# Patient Record
Sex: FEMALE | Race: OTHER | NOT HISPANIC OR LATINO
[De-identification: names, ages, dates, MRNs, and addresses within clinical notes are randomized per-mention and may not be internally consistent; named-entity substitution may affect disease eponyms.]

---

## 2017-04-04 ENCOUNTER — APPOINTMENT (OUTPATIENT)
Dept: OPHTHALMOLOGY | Facility: CLINIC | Age: 72
End: 2017-04-04

## 2022-06-16 ENCOUNTER — RESULT REVIEW (OUTPATIENT)
Age: 77
End: 2022-06-16

## 2022-06-16 ENCOUNTER — APPOINTMENT (OUTPATIENT)
Dept: PULMONOLOGY | Facility: CLINIC | Age: 77
End: 2022-06-16
Payer: MEDICARE

## 2022-06-16 ENCOUNTER — APPOINTMENT (OUTPATIENT)
Dept: CT IMAGING | Facility: CLINIC | Age: 77
End: 2022-06-16
Payer: MEDICARE

## 2022-06-16 ENCOUNTER — OUTPATIENT (OUTPATIENT)
Dept: OUTPATIENT SERVICES | Facility: HOSPITAL | Age: 77
LOS: 1 days | End: 2022-06-16

## 2022-06-16 VITALS
SYSTOLIC BLOOD PRESSURE: 109 MMHG | RESPIRATION RATE: 12 BRPM | DIASTOLIC BLOOD PRESSURE: 63 MMHG | HEART RATE: 60 BPM | TEMPERATURE: 97.9 F | OXYGEN SATURATION: 90 %

## 2022-06-16 DIAGNOSIS — Z78.9 OTHER SPECIFIED HEALTH STATUS: ICD-10-CM

## 2022-06-16 DIAGNOSIS — C50.919 MALIGNANT NEOPLASM OF UNSPECIFIED SITE OF UNSPECIFIED FEMALE BREAST: ICD-10-CM

## 2022-06-16 DIAGNOSIS — I10 ESSENTIAL (PRIMARY) HYPERTENSION: ICD-10-CM

## 2022-06-16 DIAGNOSIS — E11.9 TYPE 2 DIABETES MELLITUS W/OUT COMPLICATIONS: ICD-10-CM

## 2022-06-16 DIAGNOSIS — M19.90 UNSPECIFIED OSTEOARTHRITIS, UNSPECIFIED SITE: ICD-10-CM

## 2022-06-16 DIAGNOSIS — U07.1 COVID-19: ICD-10-CM

## 2022-06-16 DIAGNOSIS — E78.5 HYPERLIPIDEMIA, UNSPECIFIED: ICD-10-CM

## 2022-06-16 DIAGNOSIS — I48.20 CHRONIC ATRIAL FIBRILLATION, UNSP: ICD-10-CM

## 2022-06-16 DIAGNOSIS — I50.9 HEART FAILURE, UNSPECIFIED: ICD-10-CM

## 2022-06-16 PROBLEM — Z00.00 ENCOUNTER FOR PREVENTIVE HEALTH EXAMINATION: Noted: 2022-06-16

## 2022-06-16 PROCEDURE — 99204 OFFICE O/P NEW MOD 45 MIN: CPT

## 2022-06-16 PROCEDURE — 71250 CT THORAX DX C-: CPT | Mod: 26,MH

## 2022-06-16 RX ORDER — TRAZODONE HYDROCHLORIDE 100 MG/1
100 TABLET ORAL
Qty: 30 | Refills: 0 | Status: ACTIVE | COMMUNITY
Start: 2022-06-16

## 2022-06-16 RX ORDER — DILTIAZEM HYDROCHLORIDE 120 MG/1
120 CAPSULE, EXTENDED RELEASE ORAL DAILY
Refills: 0 | Status: ACTIVE | COMMUNITY
Start: 2022-06-16

## 2022-06-16 RX ORDER — ATORVASTATIN CALCIUM 40 MG/1
40 TABLET, FILM COATED ORAL DAILY
Qty: 90 | Refills: 3 | Status: ACTIVE | COMMUNITY
Start: 2022-06-16

## 2022-06-16 RX ORDER — DIGOXIN 250 UG/1
250 TABLET ORAL DAILY
Refills: 0 | Status: ACTIVE | COMMUNITY
Start: 2022-06-16

## 2022-06-16 RX ORDER — LETROZOLE TABLETS 2.5 MG/1
2.5 TABLET, FILM COATED ORAL DAILY
Refills: 0 | Status: ACTIVE | COMMUNITY
Start: 2022-06-16

## 2022-06-16 RX ORDER — POTASSIUM CHLORIDE 750 MG/1
10 TABLET, EXTENDED RELEASE ORAL
Qty: 15 | Refills: 11 | Status: ACTIVE | COMMUNITY
Start: 2022-06-16

## 2022-06-16 RX ORDER — APIXABAN 5 MG/1
5 TABLET, FILM COATED ORAL
Refills: 0 | Status: ACTIVE | COMMUNITY
Start: 2022-06-16

## 2022-06-16 RX ORDER — LIDOCAINE 5% 700 MG/1
5 PATCH TOPICAL
Qty: 20 | Refills: 0 | Status: ACTIVE | COMMUNITY
Start: 2022-06-16

## 2022-06-16 RX ORDER — FUROSEMIDE 40 MG/1
40 TABLET ORAL
Qty: 90 | Refills: 3 | Status: ACTIVE | COMMUNITY
Start: 2022-06-16

## 2022-06-16 RX ORDER — ESCITALOPRAM OXALATE 20 MG/1
20 TABLET, FILM COATED ORAL DAILY
Refills: 0 | Status: ACTIVE | COMMUNITY
Start: 2022-06-16

## 2022-06-16 RX ORDER — METFORMIN HYDROCHLORIDE 500 MG/1
500 TABLET, COATED ORAL TWICE DAILY
Qty: 60 | Refills: 11 | Status: ACTIVE | COMMUNITY
Start: 2022-06-16

## 2022-06-16 NOTE — HISTORY OF PRESENT ILLNESS
[Never] : never [TextBox_4] : Was followed by her pulmonologist and cardiologist and will you.  The patient had COVID in 2020 and she had occasional cough and since January she after her second episode of COVID she started complaining of worsening of her productive cough with thick secretions.  She was seen the pulmonologist and was told she has heart failure will give you more Lasix that the cough is worse all day sometimes keeps her up at night and the sputum is very thick.  She was given nebulizer Atrovent by her primary physician and is not helping much [Obstructive Sleep Apnea] : obstructive sleep apnea [Awakes Unrefreshed] : awakes unrefreshed [Awakes with Dry Mouth] : awakes with dry mouth [Awakes with Headache] : awakes with headache [Cataplexy] : denies cataplexy [Daytime Somnolence] : denies daytime somnolence [Difficulty Initiating Sleep] : does not have difficulty initiating sleep [Difficulty Maintaining Sleep] : does not have difficulty maintaining sleep [Dysesthesias] : denies dysesthesias [Fatigue] : fatigue [Snoring] : no snoring [ESS] : 4

## 2022-06-16 NOTE — ASSESSMENT
[FreeTextEntry1] : Chronic cough\par \par I discussed the case in details with the daughter.  I reviewed the CT scan of the chest was done in March and the previous chest x-ray.  The description of the CT scan was consistent with small airway disease.  \par \par In my impression the cough is most likely related to underlying airway disease possible underlying bronchiectasis related to his previous to COVID and pulmonary infections.  My recommendation is to improve the pulmonary toilet\par \par Started the patient on albuterol and Atrovent nebulizer every 6 hours\par \par Started 3% saline nebulizer twice a day\par \par Started Mucomyst 10% twice a day\par \par Follow-up with repeat CT scan to evaluate the tracheobronchial tree and if there is any evidence bronchiectasis and TBM.\par \par Sputum culture sample was obtained and sent for culture\par \par This time there is no evidence of underlying infectious process but there might be an element of swallow disorder and will follow up with the swallow evaluation.\par \par Chronic hypoxic respiratory failure\par \par Patient uses oxygen and at night and his baseline oxygen saturation at rest during the day is normal.\par

## 2022-06-21 LAB — BACTERIA SPT CULT: NORMAL

## 2022-06-23 ENCOUNTER — TRANSCRIPTION ENCOUNTER (OUTPATIENT)
Age: 77
End: 2022-06-23

## 2022-06-23 ENCOUNTER — FORM ENCOUNTER (OUTPATIENT)
Age: 77
End: 2022-06-23

## 2022-06-24 ENCOUNTER — OUTPATIENT (OUTPATIENT)
Dept: OUTPATIENT SERVICES | Facility: HOSPITAL | Age: 77
LOS: 1 days | End: 2022-06-24
Payer: MEDICARE

## 2022-06-24 DIAGNOSIS — G47.33 OBSTRUCTIVE SLEEP APNEA (ADULT) (PEDIATRIC): ICD-10-CM

## 2022-06-24 PROCEDURE — 93306 TTE W/DOPPLER COMPLETE: CPT | Mod: 26

## 2022-06-24 PROCEDURE — 93306 TTE W/DOPPLER COMPLETE: CPT

## 2022-07-01 ENCOUNTER — APPOINTMENT (OUTPATIENT)
Dept: PULMONOLOGY | Facility: CLINIC | Age: 77
End: 2022-07-01

## 2022-07-01 PROCEDURE — 99443: CPT | Mod: 95

## 2022-07-01 NOTE — REASON FOR VISIT
[Home] : at home, [unfilled] , at the time of the visit. [Medical Office: ([facility])___] : at the medical office located in  [Verbal consent obtained from patient] : the patient, [unfilled]

## 2022-07-01 NOTE — ASSESSMENT
[FreeTextEntry1] : Tracheobronchomalacia I discussed the case in details with the patient daughter.  The CT scan of the chest finding was discussed with the patient's daughter in detail.  The the CT scan is consistent with tracheobronchomalacia with mucus impaction in the the right middle lobe and right lower lobe and in the's trachea.  The condition most likely was related to previous infectious process.  Patient is to continue on the nebulizer treatment but also she will require the aggressive pulmonary toilet with aerobic and chest vest.  I discussed with the daughter the need for bronchoscopy to reexpand the right lower lobe and evaluated this is all related to mucus impaction or endobronchial lesion which I doubted as.  Pulmonary nodule  The right lower lobe revealed pulmonary nodules also adrenal nodule which could be benign at the normal.  I recommended PET scan but the patient said that she had a CT scan 10 years ago when the mother healthcare provider reviewed the CT scan and was unchanged.  Informed the daughter to provide me with the report of the CT scan from last 10 years.  Pulmonary hypertension  The echocardiogram revealed decrease in the systolic function and also elevation in pulmonary pressures.  The condition most likely related to group 2 group 3.  No evidence of right heart failure.  Obstructive sleep apnea  I informed the daughter that the patient has to be compliant with the CPAP mask and also might help her in the treatment of tracheobronchomalacia but the concern is promoting mucous plugging.  The daughter will talk to the healthcare agent and they will contact me to discuss the case in more details

## 2022-07-18 ENCOUNTER — INPATIENT (INPATIENT)
Facility: HOSPITAL | Age: 77
LOS: 0 days | Discharge: HOME HEALTH | DRG: 202 | End: 2022-07-19
Payer: COMMERCIAL

## 2022-07-18 VITALS
OXYGEN SATURATION: 92 % | SYSTOLIC BLOOD PRESSURE: 123 MMHG | WEIGHT: 160.06 LBS | HEART RATE: 73 BPM | DIASTOLIC BLOOD PRESSURE: 73 MMHG | RESPIRATION RATE: 18 BRPM | TEMPERATURE: 98 F

## 2022-07-18 DIAGNOSIS — R06.02 SHORTNESS OF BREATH: ICD-10-CM

## 2022-07-18 DIAGNOSIS — J98.09 OTHER DISEASES OF BRONCHUS, NOT ELSEWHERE CLASSIFIED: ICD-10-CM
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
## 2022-07-18 DIAGNOSIS — G47.33 OBSTRUCTIVE SLEEP APNEA (ADULT) (PEDIATRIC): ICD-10-CM

## 2022-07-18 DIAGNOSIS — I48.20 CHRONIC ATRIAL FIBRILLATION, UNSPECIFIED: ICD-10-CM

## 2022-07-18 DIAGNOSIS — T17.490A OTHER FOREIGN OBJECT IN TRACHEA CAUSING ASPHYXIATION, INITIAL ENCOUNTER: ICD-10-CM

## 2022-07-18 DIAGNOSIS — Z99.89 DEPENDENCE ON OTHER ENABLING MACHINES AND DEVICES: ICD-10-CM

## 2022-07-18 DIAGNOSIS — E11.9 TYPE 2 DIABETES MELLITUS WITHOUT COMPLICATIONS: ICD-10-CM

## 2022-07-18 DIAGNOSIS — I11.0 HYPERTENSIVE HEART DISEASE WITH HEART FAILURE: ICD-10-CM

## 2022-07-18 DIAGNOSIS — Z95.0 PRESENCE OF CARDIAC PACEMAKER: ICD-10-CM

## 2022-07-18 DIAGNOSIS — I27.20 PULMONARY HYPERTENSION, UNSPECIFIED: ICD-10-CM

## 2022-07-18 DIAGNOSIS — E78.5 HYPERLIPIDEMIA, UNSPECIFIED: ICD-10-CM

## 2022-07-18 DIAGNOSIS — I51.7 CARDIOMEGALY: ICD-10-CM

## 2022-07-18 DIAGNOSIS — I50.22 CHRONIC SYSTOLIC (CONGESTIVE) HEART FAILURE: ICD-10-CM

## 2022-07-18 DIAGNOSIS — T17.590A OTHER FOREIGN OBJECT IN BRONCHUS CAUSING ASPHYXIATION, INITIAL ENCOUNTER: ICD-10-CM

## 2022-07-18 DIAGNOSIS — J98.11 ATELECTASIS: ICD-10-CM

## 2022-07-18 DIAGNOSIS — Z29.9 ENCOUNTER FOR PROPHYLACTIC MEASURES, UNSPECIFIED: ICD-10-CM

## 2022-07-18 DIAGNOSIS — Z87.09 PERSONAL HISTORY OF OTHER DISEASES OF THE RESPIRATORY SYSTEM: ICD-10-CM

## 2022-07-18 DIAGNOSIS — Z86.39 PERSONAL HISTORY OF OTHER ENDOCRINE, NUTRITIONAL AND METABOLIC DISEASE: ICD-10-CM

## 2022-07-18 DIAGNOSIS — Q32.1 OTHER CONGENITAL MALFORMATIONS OF TRACHEA: ICD-10-CM

## 2022-07-18 DIAGNOSIS — I10 ESSENTIAL (PRIMARY) HYPERTENSION: ICD-10-CM

## 2022-07-18 DIAGNOSIS — U09.9 POST COVID-19 CONDITION, UNSPECIFIED: ICD-10-CM

## 2022-07-18 DIAGNOSIS — J47.9 BRONCHIECTASIS, UNCOMPLICATED: ICD-10-CM

## 2022-07-18 DIAGNOSIS — J96.01 ACUTE RESPIRATORY FAILURE WITH HYPOXIA: ICD-10-CM

## 2022-07-18 DIAGNOSIS — J39.8 OTHER SPECIFIED DISEASES OF UPPER RESPIRATORY TRACT: ICD-10-CM

## 2022-07-18 DIAGNOSIS — J45.909 UNSPECIFIED ASTHMA, UNCOMPLICATED: ICD-10-CM

## 2022-07-18 DIAGNOSIS — Z79.01 LONG TERM (CURRENT) USE OF ANTICOAGULANTS: ICD-10-CM

## 2022-07-18 DIAGNOSIS — I50.9 HEART FAILURE, UNSPECIFIED: ICD-10-CM

## 2022-07-18 DIAGNOSIS — I08.3 COMBINED RHEUMATIC DISORDERS OF MITRAL, AORTIC AND TRICUSPID VALVES: ICD-10-CM

## 2022-07-18 LAB
ALBUMIN SERPL ELPH-MCNC: 4.3 G/DL — SIGNIFICANT CHANGE UP (ref 3.3–5)
ALP SERPL-CCNC: 80 U/L — SIGNIFICANT CHANGE UP (ref 40–120)
ALT FLD-CCNC: 19 U/L — SIGNIFICANT CHANGE UP (ref 10–45)
ANION GAP SERPL CALC-SCNC: 10 MMOL/L — SIGNIFICANT CHANGE UP (ref 5–17)
APTT BLD: 37.1 SEC — HIGH (ref 27.5–35.5)
AST SERPL-CCNC: 21 U/L — SIGNIFICANT CHANGE UP (ref 10–40)
BASE EXCESS BLDV CALC-SCNC: 4 MMOL/L — HIGH (ref -2–3)
BASOPHILS # BLD AUTO: 0.07 K/UL — SIGNIFICANT CHANGE UP (ref 0–0.2)
BASOPHILS NFR BLD AUTO: 0.7 % — SIGNIFICANT CHANGE UP (ref 0–2)
BILIRUB SERPL-MCNC: 0.6 MG/DL — SIGNIFICANT CHANGE UP (ref 0.2–1.2)
BLD GP AB SCN SERPL QL: NEGATIVE — SIGNIFICANT CHANGE UP
BUN SERPL-MCNC: 13 MG/DL — SIGNIFICANT CHANGE UP (ref 7–23)
CA-I SERPL-SCNC: 1.25 MMOL/L — SIGNIFICANT CHANGE UP (ref 1.15–1.33)
CALCIUM SERPL-MCNC: 10 MG/DL — SIGNIFICANT CHANGE UP (ref 8.4–10.5)
CHLORIDE SERPL-SCNC: 103 MMOL/L — SIGNIFICANT CHANGE UP (ref 96–108)
CO2 BLDV-SCNC: 30.6 MMOL/L — HIGH (ref 22–26)
CO2 SERPL-SCNC: 28 MMOL/L — SIGNIFICANT CHANGE UP (ref 22–31)
CREAT SERPL-MCNC: 0.6 MG/DL — SIGNIFICANT CHANGE UP (ref 0.5–1.3)
EGFR: 94 ML/MIN/1.73M2 — SIGNIFICANT CHANGE UP
EOSINOPHIL # BLD AUTO: 0.32 K/UL — SIGNIFICANT CHANGE UP (ref 0–0.5)
EOSINOPHIL NFR BLD AUTO: 3.4 % — SIGNIFICANT CHANGE UP (ref 0–6)
GAS PNL BLDV: 133 MMOL/L — LOW (ref 136–145)
GAS PNL BLDV: SIGNIFICANT CHANGE UP
GLUCOSE SERPL-MCNC: 118 MG/DL — HIGH (ref 70–99)
HCO3 BLDV-SCNC: 29 MMOL/L — SIGNIFICANT CHANGE UP (ref 22–29)
HCT VFR BLD CALC: 42.3 % — SIGNIFICANT CHANGE UP (ref 34.5–45)
HGB BLD-MCNC: 13.8 G/DL — SIGNIFICANT CHANGE UP (ref 11.5–15.5)
IMM GRANULOCYTES NFR BLD AUTO: 0.5 % — SIGNIFICANT CHANGE UP (ref 0–1.5)
INR BLD: 1.6 — HIGH (ref 0.88–1.16)
LYMPHOCYTES # BLD AUTO: 1.63 K/UL — SIGNIFICANT CHANGE UP (ref 1–3.3)
LYMPHOCYTES # BLD AUTO: 17.1 % — SIGNIFICANT CHANGE UP (ref 13–44)
MCHC RBC-ENTMCNC: 31.1 PG — SIGNIFICANT CHANGE UP (ref 27–34)
MCHC RBC-ENTMCNC: 32.6 GM/DL — SIGNIFICANT CHANGE UP (ref 32–36)
MCV RBC AUTO: 95.3 FL — SIGNIFICANT CHANGE UP (ref 80–100)
MONOCYTES # BLD AUTO: 0.74 K/UL — SIGNIFICANT CHANGE UP (ref 0–0.9)
MONOCYTES NFR BLD AUTO: 7.8 % — SIGNIFICANT CHANGE UP (ref 2–14)
NEUTROPHILS # BLD AUTO: 6.71 K/UL — SIGNIFICANT CHANGE UP (ref 1.8–7.4)
NEUTROPHILS NFR BLD AUTO: 70.5 % — SIGNIFICANT CHANGE UP (ref 43–77)
NRBC # BLD: 0 /100 WBCS — SIGNIFICANT CHANGE UP (ref 0–0)
NT-PROBNP SERPL-SCNC: 1932 PG/ML — HIGH (ref 0–300)
PCO2 BLDV: 45 MMHG — HIGH (ref 39–42)
PH BLDV: 7.42 — SIGNIFICANT CHANGE UP (ref 7.32–7.43)
PLATELET # BLD AUTO: 204 K/UL — SIGNIFICANT CHANGE UP (ref 150–400)
PO2 BLDV: 34 MMHG — SIGNIFICANT CHANGE UP (ref 25–45)
POTASSIUM BLDV-SCNC: 3.8 MMOL/L — SIGNIFICANT CHANGE UP (ref 3.5–5.1)
POTASSIUM SERPL-MCNC: 4.1 MMOL/L — SIGNIFICANT CHANGE UP (ref 3.5–5.3)
POTASSIUM SERPL-SCNC: 4.1 MMOL/L — SIGNIFICANT CHANGE UP (ref 3.5–5.3)
PROT SERPL-MCNC: 6.7 G/DL — SIGNIFICANT CHANGE UP (ref 6–8.3)
PROTHROM AB SERPL-ACNC: 19.1 SEC — HIGH (ref 10.5–13.4)
RBC # BLD: 4.44 M/UL — SIGNIFICANT CHANGE UP (ref 3.8–5.2)
RBC # FLD: 14.6 % — HIGH (ref 10.3–14.5)
RH IG SCN BLD-IMP: POSITIVE — SIGNIFICANT CHANGE UP
SAO2 % BLDV: SIGNIFICANT CHANGE UP % (ref 67–88)
SARS-COV-2 RNA SPEC QL NAA+PROBE: NEGATIVE — SIGNIFICANT CHANGE UP
SODIUM SERPL-SCNC: 141 MMOL/L — SIGNIFICANT CHANGE UP (ref 135–145)
TROPONIN T SERPL-MCNC: 0.02 NG/ML — HIGH (ref 0–0.01)
TSH SERPL-MCNC: 1.57 UIU/ML — SIGNIFICANT CHANGE UP (ref 0.27–4.2)
WBC # BLD: 9.52 K/UL — SIGNIFICANT CHANGE UP (ref 3.8–10.5)
WBC # FLD AUTO: 9.52 K/UL — SIGNIFICANT CHANGE UP (ref 3.8–10.5)

## 2022-07-18 PROCEDURE — 99221 1ST HOSP IP/OBS SF/LOW 40: CPT

## 2022-07-18 PROCEDURE — 71045 X-RAY EXAM CHEST 1 VIEW: CPT | Mod: 26

## 2022-07-18 PROCEDURE — 99285 EMERGENCY DEPT VISIT HI MDM: CPT | Mod: CS

## 2022-07-18 RX ORDER — IPRATROPIUM/ALBUTEROL SULFATE 18-103MCG
3 AEROSOL WITH ADAPTER (GRAM) INHALATION EVERY 6 HOURS
Refills: 0 | Status: DISCONTINUED | OUTPATIENT
Start: 2022-07-18 | End: 2022-07-19

## 2022-07-18 RX ORDER — ESCITALOPRAM OXALATE 10 MG/1
20 TABLET, FILM COATED ORAL DAILY
Refills: 0 | Status: DISCONTINUED | OUTPATIENT
Start: 2022-07-18 | End: 2022-07-19

## 2022-07-18 RX ORDER — FUROSEMIDE 40 MG
20 TABLET ORAL DAILY
Refills: 0 | Status: DISCONTINUED | OUTPATIENT
Start: 2022-07-18 | End: 2022-07-19

## 2022-07-18 RX ORDER — TRAZODONE HCL 50 MG
150 TABLET ORAL AT BEDTIME
Refills: 0 | Status: DISCONTINUED | OUTPATIENT
Start: 2022-07-18 | End: 2022-07-19

## 2022-07-18 RX ORDER — FUROSEMIDE 40 MG
20 TABLET ORAL ONCE
Refills: 0 | Status: COMPLETED | OUTPATIENT
Start: 2022-07-19 | End: 2022-07-19

## 2022-07-18 RX ORDER — SODIUM CHLORIDE 9 MG/ML
4 INJECTION INTRAMUSCULAR; INTRAVENOUS; SUBCUTANEOUS EVERY 12 HOURS
Refills: 0 | Status: DISCONTINUED | OUTPATIENT
Start: 2022-07-18 | End: 2022-07-19

## 2022-07-18 RX ORDER — DIGOXIN 250 MCG
250 TABLET ORAL DAILY
Refills: 0 | Status: DISCONTINUED | OUTPATIENT
Start: 2022-07-18 | End: 2022-07-19

## 2022-07-18 RX ORDER — FUROSEMIDE 40 MG
20 TABLET ORAL ONCE
Refills: 0 | Status: DISCONTINUED | OUTPATIENT
Start: 2022-07-18 | End: 2022-07-18

## 2022-07-18 RX ORDER — DILTIAZEM HCL 120 MG
120 CAPSULE, EXT RELEASE 24 HR ORAL DAILY
Refills: 0 | Status: DISCONTINUED | OUTPATIENT
Start: 2022-07-18 | End: 2022-07-18

## 2022-07-18 RX ORDER — METOPROLOL TARTRATE 50 MG
50 TABLET ORAL EVERY 12 HOURS
Refills: 0 | Status: DISCONTINUED | OUTPATIENT
Start: 2022-07-18 | End: 2022-07-19

## 2022-07-18 RX ADMIN — Medication 3 MILLILITER(S): at 21:52

## 2022-07-18 RX ADMIN — SODIUM CHLORIDE 4 MILLILITER(S): 9 INJECTION INTRAMUSCULAR; INTRAVENOUS; SUBCUTANEOUS at 18:02

## 2022-07-18 RX ADMIN — Medication 150 MILLIGRAM(S): at 23:04

## 2022-07-18 NOTE — H&P ADULT - PROBLEM SELECTOR PLAN 2
Likely 2/2 COVID atelectasis and mucus buildup. SOB occurs primarily during coughing episodes. Will likely improve with treatment of above cough. Patient has no CP or LOC.   - See above recs (-persistent cough hx)  - O2 if sat < 92% RA Likely 2/2 COVID atelectasis and mucus buildup. SOB occurs primarily during coughing episodes. Will likely improve with treatment of above cough. Patient has no CP or LOC.   - See above recs (-persistent cough hx)

## 2022-07-18 NOTE — H&P ADULT - PROBLEM SELECTOR PLAN 7
F: Will do bedside swallow to see how tolerating PO  E: replete K<4, Mg<2  N: NPO until bedside swallow   VTE Prophylaxis: Lovenox 40 Q24H  GI: not needed  C: Full Code  D: RMF F: none  E: replete K<4, Mg<2  N: NPO until bedside swallow   VTE Prophylaxis: Lovenox 40 Q24H  GI: not needed  C: Full Code  D: RMF

## 2022-07-18 NOTE — ED PROVIDER NOTE - OBJECTIVE STATEMENT
76F PMH CHF, PPM, DM, CRISTIN, pulm HTN, HTN, afib, HLD, p/w cough. Pt has worsening cough for several months. Has been recently following w/ Dr. Hart, was referred to ED today for admission for likely bronch tomorrow. Pt w/ slowly worsening SOB and cough. Uses CPAP at night. No O2 during the day. Has occasional CP/abd pain - but only while coughing. No other systemic symptoms.   Denies f/c, rhinorrhea, sore throat, HA, NVD, abd pain, NVD, urinary complaints, focal weakness/numbness, LE pain/swelling.     CT chest 6/27/22: 1. Limited study, but findings are consistent with tracheobronchomalacia. 2. Large amount of mucus within the trachea, the bronchus intermedius, in the right middle and lower lobe bronchi, with atelectasis of entire right middle lobe and large portion of right lower lobe. Recommend bronchoscopy. 3. A 21 mm solid nodule is present in the right upper lobe. Lung cancer should be excluded. Recommend biopsy and/or PET scan. 4. Dilated main pulmonary artery, suspicious for pulmonary hypertension. 5. Severe atherosclerotic disease. 6. Cardiomegaly. 7. Moderate pericardial effusion. 8. There is a 3.5 cm nodule in the left adrenal gland. It is not a lipid rich adenoma. This can be further evaluated with either CT scan with adrenal mass protocol or PET scan.  Echo 6/24/22: 1. Mild symmetric left ventricular hypertrophy.  2. Severely reduced left ventricular systolic function.  3. Grade III left ventricular diastolic dysfunction. 4. Normal right ventricular size and systolic function. 5. Severely dilated left atrium. 6. Dilated right atrium. 7. Mild-to-moderate aortic regurgitation.   8. Moderate mitral regurgitation. 9. Aortic sclerosis without significant stenosis. 10. Pulmonary artery systolic pressure is 36 mmHg. 11. Trivial pericardial effusion. 12. No prior echo is available for comparison.

## 2022-07-18 NOTE — H&P ADULT - ASSESSMENT
Patient is a 75 yo G with a PMH of CHF, DM2, CRISTIN (on CPAP), pulm HTN, Permanent Pacemaker, Afib, HLD who presents for a 2 month history of productive cough and moderate SOB. The patient is being admitted for inpatient bronchoscopy 2/2 referral from outpatient physician Dr. Tanner green.

## 2022-07-18 NOTE — CONSULT NOTE ADULT - ATTENDING COMMENTS
Initial attending contact date  7/18/22    . See fellow note written above for details. I reviewed the fellow documentation. I have personally seen and examined this patient. I reviewed vitals, labs, medications, cardiac studies, and additional imaging. I agree with the above fellow's findings and plans as written above with the following additions/statements.    77yo F with a PMH of atelectasis, HFrEF (EF 20-25%), DM2, CRISTIN (on CPAP), pulm HTN, Afib s/p dual chamber PPM placed 12 years ago, HLD, with recent COVID PNA 2 months ago, who presents for a 2 month history of productive cough and moderate SOB. Admitted to [facility] with acute hypoxic respiratory failure with plans for bronchoscopy. Cardiology consulted for pre op.  -Pt with limited functional capacity but without anginal equivalents  -With known HFrEF, currently euvolemic on exam  -EKG V paced  -Non urgent echo to reassess EF  -From cardiac standpoint, pt with compensated HF and is considered int risk for low risk procedure   -Would hold off on cardizem given hx low EF  -Check dig level , cont BB  -Further GDMT pending repeat ECHO

## 2022-07-18 NOTE — CONSULT NOTE ADULT - SUBJECTIVE AND OBJECTIVE BOX
HPI:  Patient is a 77 yo G with a PMH of atelectasis, CHF, DM2, CRISTIN (on CPAP), pulm HTN, permanent Pacemaker, Afib, HLD who presents for a 2 month history of productive cough and moderate SOB. She has been seeing Dr. Hart outpatient who referred her to the ED for admission. The patient's cough has been worsening for this two month period. She denies any blood in the mucus produced from the cough. The patient's SOB worsens with mobility and is mild during rest. Patient had a recent diagnosis of COVID in which she reports that has worsened her cough. During coughing she endorses slight chest and abdominal pain however this is absent outside of the cough. She also has occasional lightheadedness and weakness during coughing episodes which are absent outside of these episodes. Patient endorses some difficulty swallowing large foods. She denies any n/v/d, CP (outside of coughing episodes), sore through, dysuria, weakness (outside of coughing episodes), or LOC .    In terms of social history the patient has the help of her family members (two daughters) who were present at bedside. She uses a walker to ambulate. The patient denies any alcohol, tobacco, or illicit drug use.     In the ED:  Initial vital signs: T: 98 F, HR: 73, BP: XX, R: 18, SpO2: 92% on RA  ED course:   Labs: significant for Glucose 118 PTT 19.1, INR 1.6  Imaging: CT Chest: IMPRESSION: Cardiomegaly, vascular calcification thoracic aorta, and left-sided implanted cardiac device. Scattered increased lung markings with no focal infiltrate or lung consolidation. No significant pleural effusion. No pneumothorax. No acute bone abnormality. Hypoinflation.  ABG: Venous: 4.0, CO2 30.6  EK/7: Pacemaker, good capture, regular rhythm, and appropriate intervals, Rate 60   Echo: Awaiting Results (2022 12:51)      ROS: A 10-point review of systems was otherwise negative.    PAST MEDICAL & SURGICAL HISTORY:  Chronic heart failure      Diabetes mellitus      CRISTIN on CPAP      HTN (hypertension)      Hyperlipidemia      History of permanent cardiac pacemaker placement          SOCIAL HISTORY:  FAMILY HISTORY:      ALLERGIES: 	  No Known Allergies            MEDICATIONS:  digoxin     Tablet 250 MICROGram(s) Oral daily  diltiazem    milliGRAM(s) Oral daily  escitalopram 20 milliGRAM(s) Oral daily  furosemide    Tablet 20 milliGRAM(s) Oral daily  metoprolol tartrate 50 milliGRAM(s) Oral every 12 hours  traZODone 150 milliGRAM(s) Oral at bedtime      HOME MEDICATIONS:  digoxin 250 mcg (0.25 mg) oral tablet: 1 tab(s) orally once a day  dilTIAZem 120 mg/24 hours oral capsule, extended release: 1 cap(s) orally once a day  Eliquis 5 mg oral tablet: 1 tab(s) orally 2 times a day  escitalopram 20 mg oral tablet: 1 tab(s) orally once a day  furosemide 20 mg oral tablet: 1 tab(s) orally once a day  Lipitor 40 mg oral tablet: 1 tab(s) orally once a day  Metoprolol Tartrate 50 mg oral tablet: 1 tab(s) orally 2 times a day  traZODone 150 mg oral tablet, extended release:       PHYSICAL EXAM:  Height (cm): 157.5 ( @ 14:01)  Weight (kg): 72.6 ( @ 10:46)  BMI (kg/m2): 29.3 ( @ 14:01)  BSA (m2): 1.74 ( @ 14:01)    I/O Summary 24H      T(F): 98.1 (22 @ 14:01), Max: 98.1 (22 @ 14:01)  HR: 60 (22 @ 14:01) (60 - 73)  BP: 105/61 (22 @ 14:01) (105/61 - 123/73)  BP(mean): --  ABP: --  ABP(mean): --  RR: 16 (22 @ 14:01) (16 - 18)  SpO2: 93% (22 @ 14:01) (92% - 95%)    GEN: Awake, comfortable. NAD.   HEENT: NCAT, PERRL, EOMI. Mucosa moist. No JVD.   RESP: CTA b/l  CV: RRR, normal s1/s2. No m/r/g.  ABD: Soft, NTND. BS+  EXT: Warm. No edema, clubbing, or cyanosis.   NEURO: AAOx3. No focal deficits.      	  LABS:	 	    CARDIAC MARKERS:              CBC 22 @ 11:27                        13.8   9.52  )-----------( 204                   42.3       Hgb trend: 13.8 <--   WBC trend: 9.52 <--     CMP 22 @ 11:26    141  |  103  |  13  ----------------------------<  118<H>  4.1   |  28  |  0.60    Ca    10.0      22 @ 11:26    TPro  6.7  /  Alb  4.3  /  TBili  0.6  /  DBili  x   /  AST  21  /  ALT  19  /  AlkPhos  80        Serum Cr trend: 0.60 <--   proBNP: Serum Pro-Brain Natriuretic Peptide: 1932 pg/mL ( @ 11:26)    Lipid Profile:   HgA1c:   TSH: Thyroid Stimulating Hormone, Serum: 1.570 uIU/mL ( @ 11:26)      TELEMETRY: 	    ECG:  	  RADIOLOGY:   ECHO:  STRESS:  CATH:   Cardiology HPI:  Patient is a 77 yo G with a PMH of atelectasis, HFrEF (EF 20-25%), DM2, CRISTIN (on CPAP), pulm HTN, Afib s/p dual chamber PPM placed 12 years ago, HLD, with recent COVID PNA 2 months ago, who presents for a 2 month history of productive cough and moderate SOB. She has been seeing Dr. Hart outpatient who referred her to the ED for admission. The patient's cough has been worsening for this two month period. She denies any blood in the mucus produced from the cough. The patient's SOB worsens with mobility and is mild during rest. Patient had a recent diagnosis of COVID in which she reports that has worsened her cough. During coughing she endorses slight chest and abdominal pain however this is absent outside of the cough. She also has occasional lightheadedness and weakness during coughing episodes which are absent outside of these episodes. Patient endorses some difficulty swallowing large foods. She denies any n/v/d, CP (outside of coughing episodes), sore through, dysuria, weakness (outside of coughing episodes), or LOC. Admitted to [facility] for acute hypoxic respiratory failure with plans bronchoscopy tomorrow. Cardiology consulted for pre op.     On evaluation, patient states that she is only able to walk around her house for the past 2 months and even doing so gets her short of breath. Before these last two months, her functional status was already limited to only ADLs around her house secondary to her arthritis. She reports having a stress test 25 years ago but doesn't know results. Denies any chest pain, but has ongoing dyspnea on exertion with cough -- no orthopnea, PND, lower extremity edema.         ROS: A 10-point review of systems was otherwise negative.    PAST MEDICAL & SURGICAL HISTORY:  Chronic heart failure      Diabetes mellitus      CRISTIN on CPAP      HTN (hypertension)      Hyperlipidemia      History of permanent cardiac pacemaker placement          SOCIAL HISTORY:  FAMILY HISTORY:      ALLERGIES: 	  No Known Allergies            MEDICATIONS:  digoxin     Tablet 250 MICROGram(s) Oral daily  diltiazem    milliGRAM(s) Oral daily  escitalopram 20 milliGRAM(s) Oral daily  furosemide    Tablet 20 milliGRAM(s) Oral daily  metoprolol tartrate 50 milliGRAM(s) Oral every 12 hours  traZODone 150 milliGRAM(s) Oral at bedtime      HOME MEDICATIONS:  digoxin 250 mcg (0.25 mg) oral tablet: 1 tab(s) orally once a day  dilTIAZem 120 mg/24 hours oral capsule, extended release: 1 cap(s) orally once a day  Eliquis 5 mg oral tablet: 1 tab(s) orally 2 times a day  escitalopram 20 mg oral tablet: 1 tab(s) orally once a day  furosemide 20 mg oral tablet: 1 tab(s) orally once a day  Lipitor 40 mg oral tablet: 1 tab(s) orally once a day  Metoprolol Tartrate 50 mg oral tablet: 1 tab(s) orally 2 times a day  traZODone 150 mg oral tablet, extended release:       PHYSICAL EXAM:  Height (cm): 157.5 (07-18 @ 14:01)  Weight (kg): 72.6 (07-18 @ 10:46)  BMI (kg/m2): 29.3 (07-18 @ 14:01)  BSA (m2): 1.74 (07-18 @ 14:01)    I/O Summary 24H      T(F): 98.1 (07-18-22 @ 14:01), Max: 98.1 (07-18-22 @ 14:01)  HR: 60 (07-18-22 @ 14:01) (60 - 73)  BP: 105/61 (07-18-22 @ 14:01) (105/61 - 123/73)  BP(mean): --  ABP: --  ABP(mean): --  RR: 16 (07-18-22 @ 14:01) (16 - 18)  SpO2: 93% (07-18-22 @ 14:01) (92% - 95%)    GEN: Awake, comfortable. NAD.   HEENT: NCAT, PERRL, EOMI. Mucosa moist. No JVD.   RESP: wheezing bilaterally, no crackles   CV: RRR, normal s1/s2. No m/r/g.  ABD: Soft, NTND. BS+  EXT: Warm. No edema, clubbing, or cyanosis.   NEURO: AAOx3. No focal deficits.      	  LABS:	 	    CARDIAC MARKERS:              CBC 07-18-22 @ 11:27                        13.8   9.52  )-----------( 204                   42.3       Hgb trend: 13.8 <--   WBC trend: 9.52 <--     CMP 07-18-22 @ 11:26    141  |  103  |  13  ----------------------------<  118<H>  4.1   |  28  |  0.60    Ca    10.0      07-18-22 @ 11:26    TPro  6.7  /  Alb  4.3  /  TBili  0.6  /  DBili  x   /  AST  21  /  ALT  19  /  AlkPhos  80  07-18      Serum Cr trend: 0.60 <--   proBNP: Serum Pro-Brain Natriuretic Peptide: 1932 pg/mL (07-18 @ 11:26)    Lipid Profile:   HgA1c:   TSH: Thyroid Stimulating Hormone, Serum: 1.570 uIU/mL (07-18 @ 11:26)      TELEMETRY: 	    ECG:  	  RADIOLOGY:   ECHO:  STRESS:  CATH:

## 2022-07-18 NOTE — H&P ADULT - PROBLEM SELECTOR PLAN 5
BP on admission 123/73. Patient has no CP, lightheadedness or dizziness outside of coughing episodes.   -Trend Vitals Patient with history of afib.  -continue with home medications (digoxin, metoprolol, eliquis)  -continue with diltiazem 120   -will obtain cardiology clearance prior to bronch.

## 2022-07-18 NOTE — H&P ADULT - NSHPLABSRESULTS_GEN_ALL_CORE
Labs significant for PTT = 19.1, INR 1.60, aPTT= 37.1, Glucose 118                        13.8   9.52  )-----------( 204      ( 18 Jul 2022 11:27 )             42.3     07-18    141  |  103  |  13  ----------------------------<  118<H>  4.1   |  28  |  0.60    Ca    10.0      18 Jul 2022 11:26    TPro  6.7  /  Alb  4.3  /  TBili  0.6  /  DBili  x   /  AST  21  /  ALT  19  /  AlkPhos  80  07-18    PT/INR - ( 18 Jul 2022 11:27 )   PT: 19.1 sec;   INR: 1.60          PTT - ( 18 Jul 2022 11:27 )  PTT:37.1 sec  Fingerstick  glucose:       RADIOLOGY & ADDITIONAL TESTS: Reviewed.

## 2022-07-18 NOTE — CONSULT NOTE ADULT - ASSESSMENT
75yo F with a PMH of atelectasis, HFrEF (EF 20-25%), DM2, CRISTIN (on CPAP), pulm HTN, Afib s/p dual chamber PPM placed 12 years ago, HLD, with recent COVID PNA 2 months ago, who presents for a 2 month history of productive cough and moderate SOB. Admitted to [facility] with acute hypoxic respiratory failure with plans for bronchoscopy. Cardiology consulted for pre op.    #Pre operative evaluation  Admitted with acute hypoxic respiratory failure from suspected reactive airway disease vs COVID - plan for bronchoscopy 7/19  EKG: V paced at HR 60  TTE (6/2022): EF 20-25% with global hypokinesis, dilated LA, moderate MR, PASP 36  METS <4, hard to discern, reportedly also limited by arthritis, only able to do ADLs  Recommendations:  - Patient is intermediate risk for low risk procedure. No further cardiac work up needed prior to bronchoscopy  - Would obtain non urgent TTE to evaluate EF to see if old TTE represents stunned myocardium in setting of recent COVID; if still reduced EF, will require GDMT  - Discontinue home diltiazem given reduced EF and can continue home beta blocker  - Can continue digoxin but please check level in AM      Case d/w attending

## 2022-07-18 NOTE — H&P ADULT - NSHPREVIEWOFSYSTEMS_GEN_ALL_CORE
CONSTITUTIONAL:  weakness only endorsed w cough otherwise no cough, fevers or chills  EYES/ENT:  No visual changes;  No vertigo or throat pain   RESPIRATORY:  + productive nonbloody cough  CARDIOVASCULAR:  No chest pain or palpitations  GASTROINTESTINAL:  No abdominal or epigastric pain. No nausea, vomiting, or hematemesis; No diarrhea or constipation. No melena or hematochezia.  GENITOURINARY:  No dysuria, frequency or hematuria

## 2022-07-18 NOTE — ED ADULT NURSE NOTE - OBJECTIVE STATEMENT
76y female presents to ED c/o productive cough and SOB for a couple of weeks. Referred to ED by MD Hart for evaluation. Pt uses CPAP machine at night. Denies chest pain, abdominal pain, n/v, fevers. A&Ox4.

## 2022-07-18 NOTE — H&P ADULT - HISTORY OF PRESENT ILLNESS
Patient is a 75 yo G with a PMH of atelectasis, CHF, DM2, CRISTIN (on CPAP), pulm HTN, permanent Pacemaker, Afib, HLD who presents for a 2 month history of productive cough and moderate SOB. She has been seeing Dr. Hart outpatient who referred her to the ED for admission. The patient's cough has been worsening for this two month period. She denies any blood in the mucus produced from the cough. The patient's SOB worsens with mobility and is mild during rest. Patient had a recent diagnosis of COVID in which she reports that has worsened her cough. During coughing she endorses slight chest and abdominal pain however this is absent outside of the cough. She also has occasional lightheadedness and weakness during coughing episodes which are absent outside of these episodes. Patient endorses some difficulty swallowing large foods. She denies any n/v/d, CP (outside of coughing episodes), sore through, dysuria, weakness (outside of coughing episodes), or LOC .    In terms of social history the patient has the help of her family members (two daughters) who were present at bedside. She uses a walker to ambulate. The patient denies any alcohol, tobacco, or illicit drug use.     In the ED:  Initial vital signs: T: 98 F, HR: 73, BP: XX, R: 18, SpO2: 92% on RA  ED course:   Labs: significant for Glucose 118 PTT 19.1, INR 1.6  Imaging: CT Chest: IMPRESSION: Cardiomegaly, vascular calcification thoracic aorta, and left-sided implanted cardiac device. Scattered increased lung markings with no focal infiltrate or lung consolidation. No significant pleural effusion. No pneumothorax. No acute bone abnormality. Hypoinflation.  ABG: Venous: 4.0, CO2 30.6  EK/7: Pacemaker, good capture, regular rhythm, and appropriate intervals, Rate 60   Echo: Awaiting Results

## 2022-07-18 NOTE — H&P ADULT - TIME BILLING
Patient seen and examined with house-staff during bedside rounds.  Resident note read, including vitals, physical findings, laboratory data, and radiological reports.   Revisions included below.  Direct personal management at bed side and extensive interpretation of the data.  Plan was outlined and discussed in details with the housestaff.  Decision making of high complexity  Action taken for acute disease activity to reflect the level of care provided:  - medication reconciliation  - review laboratory data  Has history of coronary artery disease A. fib CHF which are chronic systolic with obstructive sleep apnea.  The patient had a recent CT scan for evaluation of the chronic cough and revealed blunting of the right normal.  Patient failed aggressive pulmonary toilet as an outpatient with CoughAssist chest vest bronchodilators and mucolytic.  Patient is scheduled for bronchoscopy tomorrow.  Patient was cleared by cardiology.  Hold anticoagulation.  Discontinue Cardizem.  Continue diuretics.  Patient might benefit from Lasix prior to the procedure tomorrow.  Swallow evaluation for dysphagia post procedure on Wednesday

## 2022-07-18 NOTE — H&P ADULT - NSHPPHYSICALEXAM_GEN_ALL_CORE
VITAL SIGNS:  T(C): 36.7 (07-18-22 @ 10:46), Max: 36.7 (07-18-22 @ 10:46)  T(F): 98 (07-18-22 @ 10:46), Max: 98 (07-18-22 @ 10:46)  HR: 73 (07-18-22 @ 10:46) (73 - 73)  BP: 123/73 (07-18-22 @ 10:46) (123/73 - 123/73)  RR: 16 (07-18-22 @ 12:58) (16 - 18)  SpO2: 95% (07-18-22 @ 12:58) (92% - 95%)      PHYSICAL EXAM:  Constitutional: WDWN resting comfortably in bed; NAD  Eyes: anicteric sclera  ENT: no nasal discharge  Respiratory: CTA B/L; no W/R/R, no retractions  Cardiac: +S1/S2; RRR; no M/R/G; PMI non-displaced  Gastrointestinal: abdomen soft, NT/ND; no rebound or guarding; +BSx4  Extremities: no peripheral edema  Musculoskeletal: NROM x4; no joint swelling, tenderness or erythema  Vascular: 2+ radial, femoral, DP/PT pulses B/L  Dermatologic: skin warm, dry and intact; no rashes, wounds, or scars  Neurologic: AAOx3; CNII-XII grossly intact; no focal deficits

## 2022-07-18 NOTE — H&P ADULT - NSICDXPASTMEDICALHX_GEN_ALL_CORE_FT
PAST MEDICAL HISTORY:  Chronic heart failure     Diabetes mellitus     History of permanent cardiac pacemaker placement     HTN (hypertension)     Hyperlipidemia     CRISTIN on CPAP

## 2022-07-18 NOTE — H&P ADULT - PROBLEM SELECTOR PLAN 1
Likely 2/2 atelectasis and mucus buildup. Patient has had a 2 month history of non-bloody, persistent cough with associated. Patient denies and CP outside of coughing episodes. XR Chest (7/18): Increased lung markings. Sating appropriately on RA. Dr. Tanner gupta from outpatient care  - f/u CT chest for worsening atelectasis to clear for bronchoscopy Likely 2/2 atelectasis and mucus buildup vs reactive airwauydz 2/2 COVID 19. Patient has had a 2 month history of non-bloody, persistent cough. Patient denies and CP outside of coughing episodes. XR Chest (7/18): Increased lung markings. Sating appropriately on RA. CT chest from June demonstrating tracheobronchomalacia, large mucous in bronchus, solid nodule RUL, dilated pulm artery, and cardiomegaly.  - f/u repeat CT chest for worsening atelectasis to clear for bronchoscopy  -plan for bronch tomorrow as per Dr Hart

## 2022-07-18 NOTE — H&P ADULT - PROBLEM SELECTOR PLAN 3
Patient with recently diagnosed HF, on metoprolol, lasix at home.  -continue home meds here  -follow bnp  -last tte: There is mild concentric left ventricular hypertrophy. Left ventricular   systolic function is severely reduced with a calculated ejection fraction   of 25-30% with global hypokinesis. Analysis of mitral annular tissue   Doppler, left atrial volume index, and tricuspid regurgitant velocity   reveals: grade III diastolic dysfunction with elevated filling pressure.  -will obtain cardiology for clearance

## 2022-07-18 NOTE — ED PROVIDER NOTE - CLINICAL SUMMARY MEDICAL DECISION MAKING FREE TEXT BOX
76F PMH CHF, PPM, DM, CRISTIN, pulm HTN, HTN, afib, HLD, p/w cough. Pt has worsening cough for several months. Has been recently following w/ Dr. Hart, was referred to ED today for admission for likely bronch tomorrow. Pt w/ slowly worsening SOB and cough. Uses CPAP at night. No O2 during the day. Has occasional CP/abd pain - but only while coughing. No other systemic symptoms.   Satting low 90s, other vitals wnl. Exam as above.  ddx: Likely pulm etiology - likely related to tracheobronchomalacia/atelectasis/mucus.   d/w Dr. Hart. Will check labs, XR, admit for further care.

## 2022-07-18 NOTE — ED PROVIDER NOTE - PROGRESS NOTE DETAILS
[place]: INR 1.6, other labs grossly wnl. COVID neg. Will admit for further care. Pt remains well appearing, no resp distress.

## 2022-07-18 NOTE — H&P ADULT - NSHPSOCIALHISTORY_GEN_ALL_CORE
Living Situation: Lives alone with help from daughters  Occupation: Retired  Tobacco Use: None  Alcohol Use: None  Drug Use: None  Sexual History: None in last 6mo

## 2022-07-19 ENCOUNTER — TRANSCRIPTION ENCOUNTER (OUTPATIENT)
Age: 77
End: 2022-07-19

## 2022-07-19 VITALS — HEART RATE: 59 BPM | DIASTOLIC BLOOD PRESSURE: 55 MMHG | SYSTOLIC BLOOD PRESSURE: 109 MMHG

## 2022-07-19 LAB
ALBUMIN SERPL ELPH-MCNC: 3.8 G/DL — SIGNIFICANT CHANGE UP (ref 3.3–5)
ALP SERPL-CCNC: 72 U/L — SIGNIFICANT CHANGE UP (ref 40–120)
ALT FLD-CCNC: 15 U/L — SIGNIFICANT CHANGE UP (ref 10–45)
ANION GAP SERPL CALC-SCNC: 10 MMOL/L — SIGNIFICANT CHANGE UP (ref 5–17)
APTT BLD: 31.4 SEC — SIGNIFICANT CHANGE UP (ref 27.5–35.5)
AST SERPL-CCNC: 16 U/L — SIGNIFICANT CHANGE UP (ref 10–40)
BASOPHILS # BLD AUTO: 0.05 K/UL — SIGNIFICANT CHANGE UP (ref 0–0.2)
BASOPHILS NFR BLD AUTO: 0.7 % — SIGNIFICANT CHANGE UP (ref 0–2)
BILIRUB SERPL-MCNC: 0.6 MG/DL — SIGNIFICANT CHANGE UP (ref 0.2–1.2)
BUN SERPL-MCNC: 11 MG/DL — SIGNIFICANT CHANGE UP (ref 7–23)
CALCIUM SERPL-MCNC: 9.5 MG/DL — SIGNIFICANT CHANGE UP (ref 8.4–10.5)
CHLORIDE SERPL-SCNC: 106 MMOL/L — SIGNIFICANT CHANGE UP (ref 96–108)
CO2 SERPL-SCNC: 26 MMOL/L — SIGNIFICANT CHANGE UP (ref 22–31)
CREAT SERPL-MCNC: 0.5 MG/DL — SIGNIFICANT CHANGE UP (ref 0.5–1.3)
DIGOXIN SERPL-MCNC: 1.2 NG/ML — SIGNIFICANT CHANGE UP (ref 0.8–2)
EGFR: 98 ML/MIN/1.73M2 — SIGNIFICANT CHANGE UP
EOSINOPHIL # BLD AUTO: 0.29 K/UL — SIGNIFICANT CHANGE UP (ref 0–0.5)
EOSINOPHIL NFR BLD AUTO: 3.9 % — SIGNIFICANT CHANGE UP (ref 0–6)
GLUCOSE SERPL-MCNC: 91 MG/DL — SIGNIFICANT CHANGE UP (ref 70–99)
GRAM STN FLD: SIGNIFICANT CHANGE UP
HCT VFR BLD CALC: 40.5 % — SIGNIFICANT CHANGE UP (ref 34.5–45)
HCV AB S/CO SERPL IA: 0.04 S/CO — SIGNIFICANT CHANGE UP
HCV AB SERPL-IMP: SIGNIFICANT CHANGE UP
HGB BLD-MCNC: 12.9 G/DL — SIGNIFICANT CHANGE UP (ref 11.5–15.5)
IMM GRANULOCYTES NFR BLD AUTO: 0.4 % — SIGNIFICANT CHANGE UP (ref 0–1.5)
INR BLD: 1.3 — HIGH (ref 0.88–1.16)
LYMPHOCYTES # BLD AUTO: 1.81 K/UL — SIGNIFICANT CHANGE UP (ref 1–3.3)
LYMPHOCYTES # BLD AUTO: 24.3 % — SIGNIFICANT CHANGE UP (ref 13–44)
MCHC RBC-ENTMCNC: 30.4 PG — SIGNIFICANT CHANGE UP (ref 27–34)
MCHC RBC-ENTMCNC: 31.9 GM/DL — LOW (ref 32–36)
MCV RBC AUTO: 95.5 FL — SIGNIFICANT CHANGE UP (ref 80–100)
MONOCYTES # BLD AUTO: 0.71 K/UL — SIGNIFICANT CHANGE UP (ref 0–0.9)
MONOCYTES NFR BLD AUTO: 9.5 % — SIGNIFICANT CHANGE UP (ref 2–14)
NEUTROPHILS # BLD AUTO: 4.56 K/UL — SIGNIFICANT CHANGE UP (ref 1.8–7.4)
NEUTROPHILS NFR BLD AUTO: 61.2 % — SIGNIFICANT CHANGE UP (ref 43–77)
NRBC # BLD: 0 /100 WBCS — SIGNIFICANT CHANGE UP (ref 0–0)
PLATELET # BLD AUTO: 177 K/UL — SIGNIFICANT CHANGE UP (ref 150–400)
POTASSIUM SERPL-MCNC: 4 MMOL/L — SIGNIFICANT CHANGE UP (ref 3.5–5.3)
POTASSIUM SERPL-SCNC: 4 MMOL/L — SIGNIFICANT CHANGE UP (ref 3.5–5.3)
PROT SERPL-MCNC: 5.9 G/DL — LOW (ref 6–8.3)
PROTHROM AB SERPL-ACNC: 15.5 SEC — HIGH (ref 10.5–13.4)
RAPID RVP RESULT: SIGNIFICANT CHANGE UP
RBC # BLD: 4.24 M/UL — SIGNIFICANT CHANGE UP (ref 3.8–5.2)
RBC # FLD: 14.6 % — HIGH (ref 10.3–14.5)
SARS-COV-2 RNA SPEC QL NAA+PROBE: SIGNIFICANT CHANGE UP
SODIUM SERPL-SCNC: 142 MMOL/L — SIGNIFICANT CHANGE UP (ref 135–145)
SPECIMEN SOURCE: SIGNIFICANT CHANGE UP
WBC # BLD: 7.45 K/UL — SIGNIFICANT CHANGE UP (ref 3.8–10.5)
WBC # FLD AUTO: 7.45 K/UL — SIGNIFICANT CHANGE UP (ref 3.8–10.5)

## 2022-07-19 PROCEDURE — 87635 SARS-COV-2 COVID-19 AMP PRB: CPT

## 2022-07-19 PROCEDURE — 87015 SPECIMEN INFECT AGNT CONCNTJ: CPT

## 2022-07-19 PROCEDURE — 99285 EMERGENCY DEPT VISIT HI MDM: CPT

## 2022-07-19 PROCEDURE — 87070 CULTURE OTHR SPECIMN AEROBIC: CPT

## 2022-07-19 PROCEDURE — 82803 BLOOD GASES ANY COMBINATION: CPT

## 2022-07-19 PROCEDURE — 84295 ASSAY OF SERUM SODIUM: CPT

## 2022-07-19 PROCEDURE — 85730 THROMBOPLASTIN TIME PARTIAL: CPT

## 2022-07-19 PROCEDURE — 82330 ASSAY OF CALCIUM: CPT

## 2022-07-19 PROCEDURE — 87206 SMEAR FLUORESCENT/ACID STAI: CPT

## 2022-07-19 PROCEDURE — 93306 TTE W/DOPPLER COMPLETE: CPT | Mod: 26

## 2022-07-19 PROCEDURE — 85025 COMPLETE CBC W/AUTO DIFF WBC: CPT

## 2022-07-19 PROCEDURE — 86901 BLOOD TYPING SEROLOGIC RH(D): CPT

## 2022-07-19 PROCEDURE — 86900 BLOOD TYPING SEROLOGIC ABO: CPT

## 2022-07-19 PROCEDURE — 84443 ASSAY THYROID STIM HORMONE: CPT

## 2022-07-19 PROCEDURE — 94640 AIRWAY INHALATION TREATMENT: CPT

## 2022-07-19 PROCEDURE — 71045 X-RAY EXAM CHEST 1 VIEW: CPT

## 2022-07-19 PROCEDURE — 84484 ASSAY OF TROPONIN QUANT: CPT

## 2022-07-19 PROCEDURE — 85610 PROTHROMBIN TIME: CPT

## 2022-07-19 PROCEDURE — 36415 COLL VENOUS BLD VENIPUNCTURE: CPT

## 2022-07-19 PROCEDURE — 71250 CT THORAX DX C-: CPT | Mod: 26

## 2022-07-19 PROCEDURE — 87116 MYCOBACTERIA CULTURE: CPT

## 2022-07-19 PROCEDURE — 87102 FUNGUS ISOLATION CULTURE: CPT

## 2022-07-19 PROCEDURE — 92610 EVALUATE SWALLOWING FUNCTION: CPT

## 2022-07-19 PROCEDURE — 80162 ASSAY OF DIGOXIN TOTAL: CPT

## 2022-07-19 PROCEDURE — 0225U NFCT DS DNA&RNA 21 SARSCOV2: CPT

## 2022-07-19 PROCEDURE — 86803 HEPATITIS C AB TEST: CPT

## 2022-07-19 PROCEDURE — 93306 TTE W/DOPPLER COMPLETE: CPT

## 2022-07-19 PROCEDURE — 80053 COMPREHEN METABOLIC PANEL: CPT

## 2022-07-19 PROCEDURE — 83880 ASSAY OF NATRIURETIC PEPTIDE: CPT

## 2022-07-19 PROCEDURE — 86850 RBC ANTIBODY SCREEN: CPT

## 2022-07-19 PROCEDURE — 31622 DX BRONCHOSCOPE/WASH: CPT | Mod: GC

## 2022-07-19 PROCEDURE — 94660 CPAP INITIATION&MGMT: CPT

## 2022-07-19 PROCEDURE — 84132 ASSAY OF SERUM POTASSIUM: CPT

## 2022-07-19 PROCEDURE — 71250 CT THORAX DX C-: CPT

## 2022-07-19 PROCEDURE — 31645 BRNCHSC W/THER ASPIR 1ST: CPT

## 2022-07-19 PROCEDURE — 99239 HOSP IP/OBS DSCHRG MGMT >30: CPT | Mod: 25

## 2022-07-19 PROCEDURE — 93005 ELECTROCARDIOGRAM TRACING: CPT

## 2022-07-19 RX ORDER — TIOTROPIUM BROMIDE 18 UG/1
2 CAPSULE ORAL; RESPIRATORY (INHALATION)
Qty: 0 | Refills: 0 | DISCHARGE

## 2022-07-19 RX ORDER — DIGOXIN 250 MCG
250 TABLET ORAL DAILY
Refills: 0 | Status: DISCONTINUED | OUTPATIENT
Start: 2022-07-19 | End: 2022-07-19

## 2022-07-19 RX ORDER — METOPROLOL TARTRATE 50 MG
1 TABLET ORAL
Qty: 0 | Refills: 0 | DISCHARGE

## 2022-07-19 RX ORDER — DILTIAZEM HCL 120 MG
1 CAPSULE, EXT RELEASE 24 HR ORAL
Qty: 0 | Refills: 0 | DISCHARGE

## 2022-07-19 RX ORDER — IPRATROPIUM/ALBUTEROL SULFATE 18-103MCG
3 AEROSOL WITH ADAPTER (GRAM) INHALATION
Qty: 0 | Refills: 0 | DISCHARGE
Start: 2022-07-19

## 2022-07-19 RX ORDER — FUROSEMIDE 40 MG
1 TABLET ORAL
Qty: 0 | Refills: 0 | DISCHARGE

## 2022-07-19 RX ORDER — CEFPODOXIME PROXETIL 100 MG
200 TABLET ORAL EVERY 12 HOURS
Refills: 0 | Status: DISCONTINUED | OUTPATIENT
Start: 2022-07-19 | End: 2022-07-19

## 2022-07-19 RX ORDER — TIOTROPIUM BROMIDE 18 UG/1
2 CAPSULE ORAL; RESPIRATORY (INHALATION)
Qty: 1 | Refills: 1
Start: 2022-07-19 | End: 2022-09-16

## 2022-07-19 RX ORDER — SODIUM CHLORIDE 9 MG/ML
4 INJECTION INTRAMUSCULAR; INTRAVENOUS; SUBCUTANEOUS
Qty: 240 | Refills: 0
Start: 2022-07-19 | End: 2022-08-17

## 2022-07-19 RX ORDER — SODIUM CHLORIDE 9 MG/ML
4 INJECTION INTRAMUSCULAR; INTRAVENOUS; SUBCUTANEOUS
Qty: 0 | Refills: 0 | DISCHARGE
Start: 2022-07-19

## 2022-07-19 RX ORDER — TRAZODONE HCL 50 MG
0 TABLET ORAL
Qty: 0 | Refills: 0 | DISCHARGE

## 2022-07-19 RX ADMIN — Medication 50 MILLIGRAM(S): at 07:57

## 2022-07-19 RX ADMIN — Medication 200 MILLIGRAM(S): at 18:46

## 2022-07-19 RX ADMIN — Medication 5 MILLIGRAM(S): at 14:21

## 2022-07-19 RX ADMIN — Medication 20 MILLIGRAM(S): at 07:56

## 2022-07-19 RX ADMIN — Medication 20 MILLIGRAM(S): at 07:32

## 2022-07-19 RX ADMIN — ESCITALOPRAM OXALATE 20 MILLIGRAM(S): 10 TABLET, FILM COATED ORAL at 14:21

## 2022-07-19 NOTE — DISCHARGE NOTE NURSING/CASE MANAGEMENT/SOCIAL WORK - NSDCPEFALRISK_GEN_ALL_CORE
For information on Fall & Injury Prevention, visit: [email or web address] OR  [email or web address] OR  https://www.cdc.gov/steadi/patient.html

## 2022-07-19 NOTE — PROGRESS NOTE ADULT - PROBLEM SELECTOR PLAN 1
Likely 2/2 atelectasis and mucus buildup vs reactive airway disease 2/2 COVID 19. Patient has had a 2 month history of non-bloody, persistent cough. Patient denies and CP outside of coughing episodes. XR Chest (7/18): Increased lung markings. Sating appropriately on RA. CT chest from June demonstrating tracheobronchomalacia, large mucous in bronchus, solid nodule RUL, dilated pulm artery, and cardiomegaly.    - Sodium Chloride 3% inhalation via nebulizer   - Albuterol ordered prn  - Bronch performed today by Dr. Hart  - f/u repeat CT chest for worsening atelectasis

## 2022-07-19 NOTE — SWALLOW BEDSIDE ASSESSMENT ADULT - SLP PERTINENT HISTORY OF CURRENT PROBLEM
75 yo W w h/o atelectasis, CHF, DM2, CRISTIN, pulm HTN adm w 2-mo h/o productive cough & SOB i/s/o recent dx of Covid

## 2022-07-19 NOTE — PROGRESS NOTE ADULT - PROBLEM SELECTOR PLAN 7
F: none  E: replete K<4, Mg<2  N: NPO until bedside swallow   VTE Prophylaxis: Lovenox 40 Q24H  GI: not needed  C: Full Code  D: RMF

## 2022-07-19 NOTE — DISCHARGE NOTE PROVIDER - CARE PROVIDERS DIRECT ADDRESSES
,DirectAddress_Unknown,[email or web address]

## 2022-07-19 NOTE — DISCHARGE NOTE PROVIDER - CARE PROVIDER_API CALL
Matias Dickinson  [address] [place] [address]  Phone: [phone]  Fax: (   )    -  Established Patient  Follow Up Time: 2 weeks    Marissa Hart)  Critical Care Medicine; Pulmonary Disease  [address] [place] [address]  Phone: [phone]  Fax: [phone]  Established Patient  Scheduled Appointment: 08/16/2022 04:15 PM

## 2022-07-19 NOTE — DISCHARGE NOTE PROVIDER - NSDCCPCAREPLAN_GEN_ALL_CORE_FT
PRINCIPAL DISCHARGE DIAGNOSIS  Diagnosis: Shortness of breath  Assessment and Plan of Treatment:       SECONDARY DISCHARGE DIAGNOSES  Diagnosis: Tracheobronchomalacia  Assessment and Plan of Treatment:     Diagnosis: Chronic HFrEF (heart failure with reduced ejection fraction)  Assessment and Plan of Treatment: -Heart failure is a condition that occurs when the heart cannot pump blood as well as it should; this leads to inadequate blood flow to vital organs such as the kidneys and congestion (buildup of fluid) in other vital organs such as the lungs   -You have a history of weakened heart muscle called reduced congestive heart failure. When the heart pumps, it doesn't squeeze normally.   -Please make sure you follow up with your cardiologist within one week of discharge.    -Please weigh yourself daily: if you have gained more than 2-3 lbs in one day or 5 lbs in one week contact your doctor immediately as you may be retaining water weight   -In addition, restrict your salt intake to less than 2 grams a day   -If you develop worsening shortening of breath, leg swelling, fatigue, chest pain, difficulty sleeping at night due to shortness of breath, contact your cardiologist immediately.    Diagnosis: Chronic atrial fibrillation  Assessment and Plan of Treatment: You have a known diagnosis of atrial fibrillation prior to your admission, which is an irregular heart rhythm. This is a condition where the atria of your heart do not contract properly, which increases your risk of stroke or heart attack.    -Please continue to take your home medications of ___________________    -Please follow up with your cardiologist within 1-2 weeks of discharge for evaluation and to determine whether any changes need to be made to your medications.       PRINCIPAL DISCHARGE DIAGNOSIS  Diagnosis: SOB (shortness of breath)  Assessment and Plan of Treatment: You were admitted to the hospital because of your shortness of breath. During this admission, you were evaluated by the pulmonologist Dr. Hart. He performed a procedure called a bronchoscopy to take a look at the inside of your lungs. You have a condition called tracheobronchomalacia (TBM), which is a weakened airway. We monitored your oxygen saturation and your vital signs to make sure you were stable before leaving the hospital.   When you leave the hospital, it is important to continue your pulmonary toilet regimen, which includes saline and acetylcysteine nebulizer treatments, as well as your and Ipratroprium-albuterol nebulizer treatment. You should also continue to take your inhaler Spiriva (2 puffs by mouth daily). Finally, wearing your CPAP machine regularly when you sleep can help keep your airways open and improve your symptoms.   You have a follow-up appointment with Dr. Hart on August 16th at 4:15 PM.  If you continue to experience shortness of breath and your symptoms do not improve, please go to the nearest emergency room or call 911.      SECONDARY DISCHARGE DIAGNOSES  Diagnosis: Chronic HFrEF (heart failure with reduced ejection fraction)  Assessment and Plan of Treatment: Heart failure is a condition that occurs when the heart cannot pump blood as well as it should; this leads to inadequate blood flow to vital organs such as the kidneys and congestion (buildup of fluid) in other vital organs such as the lungs. You have a history of weakened heart muscle called reduced congestive heart failure. When the heart pumps, it doesn't squeeze normally.   While you were in the hospital, we did imaging of your heart called an echocardiography to take a look at the way your heart is pumping. We compared it to the echnocardiogram that was performed on 6/24/22. During this admission, we discontinued one of your medications (Diltiazem) before your bronchoscopy because it can interfere with the heart's ability to pump blood. Please do not continue to take this medication when you leave the hospital. It is important to follow up with your cardiologist Dr. Dickinson after you leave the hospital. Dr. Dickinson will let you know if it is safe to restart this medication.   The office of Dr. Dickinson will reach out to you in order to schedule an appointment.   Please continue to take metoprolol 50 mg twice a day.  Please continue to take furosemide 20 mg once a day.   To monitor and control your heart failure:  -Please weigh yourself daily: if you have gained more than 2-3 lbs in one day or 5 lbs in one week contact your doctor immediately as you may be retaining water weight   -In addition, restrict your salt intake to less than 2 grams a day   -If you develop worsening shortening of breath, leg swelling, fatigue, chest pain, difficulty sleeping at night due to shortness of breath, contact your cardiologist immediately.    Diagnosis: Chronic atrial fibrillation  Assessment and Plan of Treatment: You have a known diagnosis of atrial fibrillation prior to your admission, which is an irregular heart rhythm. This is a condition where the atria of your heart do not contract properly, which increases your risk of stroke or heart attack.    -Please continue to take your home medications metoprolol 50 mg twice a day.    -Please continue to take digoxin 250 mg every day.     -Please follow up with your cardiologist Dr. Dickinson for evaluation and to determine whether any changes need to be made to your medications.      Diagnosis: Tracheobronchomalacia  Assessment and Plan of Treatment: During this hospitalization, you were found to have tracheobronchomalacia (TBM). TBM occurs when the walls of the airway, specifically the trachea and bronchi, are weak. As you breathe out, the weak airway collapses partially or completely. TBM can be seen in adults with weakened airways. Symptoms include shortnes of breath, a "barking" cough, chronic airway infections, and wheezing. TBM is difficult to diagnose because it can mimic other diseases. While you were in the hospital, your pulmonologist Dr. Hart confirmed this diagnosis with a procedure called a bronchoscopy.  When you leave the hospital, it is important to continue your pulmonary toilet regimen, which includes saline and acetylcysteine nebulizer treatments, as well as your and Ipratroprium-albuterol nebulizer treatment. You should also continue to take your inhaler Spiriva (2 puffs by mouth daily). Finally, wearing your CPAP machine regularly when you sleep can help keep your airways open and improve your symptoms.   You have a follow-up appointment with Dr. Hart on August 16th at 4:15 PM.  If you continue to experience shortness of breath and your symptoms do not improve, please go to the nearest emergency room or call 911.    Diagnosis: Chronic hypertension  Assessment and Plan of Treatment: You have a known history of high blood pressure prior to your admission. To manage this you are on a medication called ____. High blood pressure can cause damage to your heart and kidneys and increases your risk of heart attack and stroke. To avoid this, It is important that you continue to take this medication when you are discharged so that you can continue to control your blood pressure. Additionally be sure to follow up with your primary care physician on a regular basis to make sure your blood pressure continues to be well controlled. If you experience symptoms such as but not limited to: sudden onset blurry vision, nausea, vomiting, chest pain, shortness of breath, or palpitations, please go to the nearest emergency room.      Diagnosis: Diabetes mellitus  Assessment and Plan of Treatment: You have a known history of diabetes mellitus prior to your admission. This condition results from blood sugar levels getting too high because your body is more resistant to insulin. Uncontrolled blood sugar levels can lead to kidney and heart damage, pain/numbness/paralysis in your hands and feet, and increased rates of infections. It is important that you continue your regular home medications when you are discharged so that you can continue to control your blood sugar levels. Additionally be sure to follow up with your primary care physician, podiatrist, and ophthalmologist on a regular basis.      Diagnosis: SOB (shortness of breath)  Assessment and Plan of Treatment:      PRINCIPAL DISCHARGE DIAGNOSIS  Diagnosis: SOB (shortness of breath)  Assessment and Plan of Treatment: You were admitted to the hospital because of your shortness of breath. During this admission, you were evaluated by the pulmonologist Dr. Hart. He performed a procedure called a bronchoscopy to take a look at the inside of your lungs. You have a condition called tracheobronchomalacia (TBM), which is a weakened airway. We monitored your oxygen saturation and your vital signs to make sure you were stable before leaving the hospital.   When you leave the hospital, it is important to continue your pulmonary toilet regimen, which includes saline and acetylcysteine nebulizer treatments, as well as your and Ipratroprium-albuterol nebulizer treatment. You should also continue to take your inhaler Spiriva (2 puffs by mouth daily). Finally, wearing your CPAP machine regularly when you sleep can help keep your airways open and improve your symptoms.   You have a follow-up appointment with Dr. Hart on August 16th at 4:15 PM.  If you continue to experience shortness of breath and your symptoms do not improve, please go to the nearest emergency room or call 911.      SECONDARY DISCHARGE DIAGNOSES  Diagnosis: Chronic HFrEF (heart failure with reduced ejection fraction)  Assessment and Plan of Treatment: Heart failure is a condition that occurs when the heart cannot pump blood as well as it should; this leads to inadequate blood flow to vital organs such as the kidneys and congestion (buildup of fluid) in other vital organs such as the lungs. You have a history of weakened heart muscle called reduced congestive heart failure. When the heart pumps, it doesn't squeeze normally.   While you were in the hospital, we did imaging of your heart called an echocardiography to take a look at the way your heart is pumping. We compared it to the echnocardiogram that was performed on 6/24/22. During this admission, we discontinued one of your medications (Diltiazem) before your bronchoscopy because it can interfere with the heart's ability to pump blood. Please do not continue to take this medication when you leave the hospital. It is important to follow up with your cardiologist Dr. Dickinson after you leave the hospital. Dr. Dickinson will let you know if it is safe to restart this medication.   The office of Dr. Dickinson will reach out to you in order to schedule an appointment.   Please continue to take metoprolol 50 mg twice a day.  Please continue to take furosemide 20 mg once a day.   To monitor and control your heart failure:  -Please weigh yourself daily: if you have gained more than 2-3 lbs in one day or 5 lbs in one week contact your doctor immediately as you may be retaining water weight   -In addition, restrict your salt intake to less than 2 grams a day   -If you develop worsening shortening of breath, leg swelling, fatigue, chest pain, difficulty sleeping at night due to shortness of breath, contact your cardiologist immediately.    Diagnosis: Chronic atrial fibrillation  Assessment and Plan of Treatment: You have a known diagnosis of atrial fibrillation prior to your admission, which is an irregular heart rhythm. This is a condition where the atria of your heart do not contract properly, which increases your risk of stroke or heart attack.    -Please continue to take your home medications metoprolol 50 mg twice a day.    -Please continue to take digoxin 250 mg every day.     -Please follow up with your cardiologist Dr. Dickinson for evaluation and to determine whether any changes need to be made to your medications.      Diagnosis: Tracheobronchomalacia  Assessment and Plan of Treatment: During this hospitalization, you were found to have tracheobronchomalacia (TBM). TBM occurs when the walls of the airway, specifically the trachea and bronchi, are weak. As you breathe out, the weak airway collapses partially or completely. TBM can be seen in adults with weakened airways. Symptoms include shortnes of breath, a "barking" cough, chronic airway infections, and wheezing. TBM is difficult to diagnose because it can mimic other diseases. While you were in the hospital, your pulmonologist Dr. Hart confirmed this diagnosis with a procedure called a bronchoscopy.  When you leave the hospital, it is important to continue your pulmonary toilet regimen, which includes saline and acetylcysteine nebulizer treatments, as well as your and Ipratroprium-albuterol nebulizer treatment. You should also continue to take your inhaler Spiriva (2 puffs by mouth daily). Finally, wearing your CPAP machine regularly when you sleep can help keep your airways open and improve your symptoms.   You have a follow-up appointment with Dr. Hart on August 16th at 4:15 PM.  If you continue to experience shortness of breath and your symptoms do not improve, please go to the nearest emergency room or call 911.    Diagnosis: Diabetes mellitus  Assessment and Plan of Treatment: You have a known history of diabetes mellitus prior to your admission. This condition results from blood sugar levels getting too high because your body is more resistant to insulin. Uncontrolled blood sugar levels can lead to kidney and heart damage, pain/numbness/paralysis in your hands and feet, and increased rates of infections. It is important that you continue your regular home medications when you are discharged so that you can continue to control your blood sugar levels. Additionally be sure to follow up with your primary care physician, podiatrist, and ophthalmologist on a regular basis.      Diagnosis: SOB (shortness of breath)  Assessment and Plan of Treatment:     Diagnosis: Chronic hypertension  Assessment and Plan of Treatment: You have a known history of high blood pressure prior to your admission. To manage this you are on a medication called Metoprolol and furosemide. High blood pressure can cause damage to your heart and kidneys and increases your risk of heart attack and stroke. To avoid this, It is important that you continue to take this medication when you are discharged so that you can continue to control your blood pressure. Additionally be sure to follow up with your primary care physician on a regular basis to make sure your blood pressure continues to be well controlled. If you experience symptoms such as but not limited to: sudden onset blurry vision, nausea, vomiting, chest pain, shortness of breath, or palpitations, please go to the nearest emergency room.       PRINCIPAL DISCHARGE DIAGNOSIS  Diagnosis: SOB (shortness of breath)  Assessment and Plan of Treatment: You were admitted to the hospital because of your shortness of breath. During this admission, you were evaluated by the pulmonologist Dr. Hart. He performed a procedure called a bronchoscopy to take a look at the inside of your lungs. You have a condition called tracheobronchomalacia (TBM), which is a weakened airway. We monitored your oxygen saturation and your vital signs to make sure you were stable before leaving the hospital.   When you leave the hospital, it is important to continue your pulmonary toilet regimen, which includes saline and acetylcysteine nebulizer treatments, as well as your and Ipratroprium-albuterol nebulizer treatment. You should also continue to take your inhaler Spiriva (2 puffs by mouth daily). Finally, wearing your CPAP machine regularly when you sleep can help keep your airways open and improve your symptoms.   You have a follow-up appointment with Dr. Hart on August 16th at 4:15 PM.  If you continue to experience shortness of breath and your symptoms do not improve, please go to the nearest emergency room or call 911.      SECONDARY DISCHARGE DIAGNOSES  Diagnosis: Tracheobronchomalacia  Assessment and Plan of Treatment: During this hospitalization, you were found to have tracheobronchomalacia (TBM). TBM occurs when the walls of the airway, specifically the trachea and bronchi, are weak. As you breathe out, the weak airway collapses partially or completely. TBM can be seen in adults with weakened airways. Symptoms include shortnes of breath, a "barking" cough, chronic airway infections, and wheezing. TBM is difficult to diagnose because it can mimic other diseases. While you were in the hospital, your pulmonologist Dr. Hart confirmed this diagnosis with a procedure called a bronchoscopy.  When you leave the hospital, it is important to continue your pulmonary toilet regimen, which includes saline and acetylcysteine nebulizer treatments, as well as your and Ipratroprium-albuterol nebulizer treatment. You should also continue to take your inhaler Spiriva (2 puffs by mouth daily). Finally, wearing your CPAP machine regularly when you sleep can help keep your airways open and improve your symptoms.   You have a follow-up appointment with Dr. Hart on August 16th at 4:15 PM.  If you continue to experience shortness of breath and your symptoms do not improve, please go to the nearest emergency room or call 911.    Diagnosis: Positive culture findings in sputum  Assessment and Plan of Treatment: On bronchoscopy your sputum was tested and found to be positive for bacteria. The final cultures are still pending but at this time, please continue taking Cefpodoxime 200mg every 12 hours for 5 days through 7/24/2022. We will contact you with final culture results and determine if any additional antibiotic mangement is necessary.    Diagnosis: Chronic HFrEF (heart failure with reduced ejection fraction)  Assessment and Plan of Treatment: Heart failure is a condition that occurs when the heart cannot pump blood as well as it should; this leads to inadequate blood flow to vital organs such as the kidneys and congestion (buildup of fluid) in other vital organs such as the lungs. You have a history of weakened heart muscle called reduced congestive heart failure. When the heart pumps, it doesn't squeeze normally.   While you were in the hospital, we did imaging of your heart called an echocardiography to take a look at the way your heart is pumping. We compared it to the echnocardiogram that was performed on 6/24/22. During this admission, we discontinued one of your medications (Diltiazem) before your bronchoscopy because it can interfere with the heart's ability to pump blood. Please do not continue to take this medication when you leave the hospital. It is important to follow up with your cardiologist Dr. Dickinson after you leave the hospital. Dr. Dickinson will let you know if it is safe to restart this medication.   The office of Dr. Dickinson will reach out to you in order to schedule an appointment.   While in the hospital your metoprolol was transitioned to a long acting form of the same medication to be taken once daily. Please continue to take metoprolol succinate 100 mg once a day starting 7/20/2022 in the morning.  Please continue to take furosemide 20 mg once a day.   To monitor and control your heart failure:  -Please weigh yourself daily: if you have gained more than 2-3 lbs in one day or 5 lbs in one week contact your doctor immediately as you may be retaining water weight   -In addition, restrict your salt intake to less than 2 grams a day   -If you develop worsening shortening of breath, leg swelling, fatigue, chest pain, difficulty sleeping at night due to shortness of breath, contact your cardiologist immediately.    Diagnosis: Chronic atrial fibrillation  Assessment and Plan of Treatment: You have a known diagnosis of atrial fibrillation prior to your admission, which is an irregular heart rhythm. This is a condition where the atria of your heart do not contract properly, which increases your risk of stroke or heart attack.    -Please continue to take your home medications metoprolol succinate 100 mg once a day starting 7/20/2022.    -Please continue to take digoxin 250 mg every day.     -Please follow up with your cardiologist Dr. Dickinson for evaluation and to determine whether any changes need to be made to your medications. While in the hospital, your blood pressures were relatively low so pending follow up outpatient, Dr. Dickinson can determine when it is appropriate to start Losartan and Spironolactone.       Diagnosis: Diabetes mellitus  Assessment and Plan of Treatment: You have a known history of diabetes mellitus prior to your admission. This condition results from blood sugar levels getting too high because your body is more resistant to insulin. Uncontrolled blood sugar levels can lead to kidney and heart damage, pain/numbness/paralysis in your hands and feet, and increased rates of infections. It is important that you continue your regular home medications when you are discharged so that you can continue to control your blood sugar levels. Additionally be sure to follow up with your primary care physician, podiatrist, and ophthalmologist on a regular basis.      Diagnosis: Chronic hypertension  Assessment and Plan of Treatment: You have a known history of high blood pressure prior to your admission. To manage this you are on a medication called Metoprolol succinate and furosemide. High blood pressure can cause damage to your heart and kidneys and increases your risk of heart attack and stroke. To avoid this, It is important that you continue to take this medication when you are discharged so that you can continue to control your blood pressure. Additionally be sure to follow up with your primary care physician on a regular basis to make sure your blood pressure continues to be well controlled. If you experience symptoms such as but not limited to: sudden onset blurry vision, nausea, vomiting, chest pain, shortness of breath, or palpitations, please go to the nearest emergency room.

## 2022-07-19 NOTE — PROGRESS NOTE ADULT - SUBJECTIVE AND OBJECTIVE BOX
OVERNIGHT EVENTS: No acute events    SUBJECTIVE / INTERVAL HPI: Patient seen and examined at bedside. Patient agitated due to poor sleep and frustration about the multiple tests she has to endure. Tried explaining the importance/purpose of each exam in place and the expectations as to the rest of her hospital stay. Has been coughing regularly with sputum production, some sob on 3L NC but denies chest pain, palpitations, abdominal pain, N/V, lower extremity pain.      PHYSICAL EXAM:    General: WDWN  HEENT: NC/AT; PERRL, anicteric sclera; MMM  Neck: supple  Cardiovascular: +S1/S2, RRR  Respiratory: CTA B/L; no W/R/R  Gastrointestinal: soft, NT/ND; +BSx4  Extremities: WWP; no edema, clubbing or cyanosis  Vascular: 2+ radial, DP/PT pulses B/L  Neurological: AAOx3; no focal deficits  Psychiatric: pleasant mood and affect  Dermatologic: no appreciable wounds or damage to the skin    VITAL SIGNS:  Vital Signs Last 24 Hrs  T(C): 36.6 (19 Jul 2022 13:25), Max: 36.7 (18 Jul 2022 14:01)  T(F): 97.9 (19 Jul 2022 13:25), Max: 98.1 (18 Jul 2022 14:01)  HR: 61 (19 Jul 2022 13:25) (59 - 72)  BP: 115/76 (19 Jul 2022 13:25) (105/61 - 138/83)  BP(mean): --  RR: 20 (19 Jul 2022 13:25) (16 - 20)  SpO2: 93% (19 Jul 2022 13:25) (91% - 97%)    Parameters below as of 19 Jul 2022 13:25  Patient On (Oxygen Delivery Method): nasal cannula  O2 Flow (L/min): 3        MEDICATIONS:  MEDICATIONS  (STANDING):  bisacodyl 5 milliGRAM(s) Oral once  digoxin     Tablet 250 MICROGram(s) Oral daily  escitalopram 20 milliGRAM(s) Oral daily  furosemide    Tablet 20 milliGRAM(s) Oral daily  metoprolol tartrate 50 milliGRAM(s) Oral every 12 hours  sodium chloride 3%  Inhalation 4 milliLiter(s) Inhalation every 12 hours  traZODone 150 milliGRAM(s) Oral at bedtime    MEDICATIONS  (PRN):  albuterol/ipratropium for Nebulization 3 milliLiter(s) Nebulizer every 6 hours PRN Shortness of Breath    ALLERGIES:  Allergies    No Known Allergies    Intolerances    LABS:                        12.9   7.45  )-----------( 177      ( 19 Jul 2022 05:30 )             40.5     07-19    142  |  106  |  11  ----------------------------<  91  4.0   |  26  |  0.50    Ca    9.5      19 Jul 2022 05:30    TPro  5.9<L>  /  Alb  3.8  /  TBili  0.6  /  DBili  x   /  AST  16  /  ALT  15  /  AlkPhos  72  07-19    PT/INR - ( 19 Jul 2022 05:30 )   PT: 15.5 sec;   INR: 1.30          PTT - ( 19 Jul 2022 05:30 )  PTT:31.4 sec    CAPILLARY BLOOD GLUCOSE          RADIOLOGY & ADDITIONAL TESTS: Reviewed. ***In Progress***    OVERNIGHT EVENTS: No acute events    SUBJECTIVE / INTERVAL HPI: Patient seen and examined at bedside. Patient agitated due to poor sleep and frustration about the multiple tests she has to endure. Tried explaining the importance/purpose of each exam in place and the expectations as to the rest of her hospital stay. Has been coughing regularly with sputum production, some sob on 3L NC but denies chest pain, palpitations, abdominal pain, N/V, lower extremity pain.      PHYSICAL EXAM:    General: WDWN  HEENT: NC/AT; PERRL, anicteric sclera; MMM  Neck: supple  Cardiovascular: +S1/S2, RRR  Respiratory: CTA B/L; no W/R/R  Gastrointestinal: soft, NT/ND; +BSx4  Extremities: WWP; no edema, clubbing or cyanosis  Vascular: 2+ radial, DP/PT pulses B/L  Neurological: AAOx3; no focal deficits  Psychiatric: pleasant mood and affect  Dermatologic: no appreciable wounds or damage to the skin    VITAL SIGNS:  Vital Signs Last 24 Hrs  T(C): 36.6 (19 Jul 2022 13:25), Max: 36.7 (18 Jul 2022 14:01)  T(F): 97.9 (19 Jul 2022 13:25), Max: 98.1 (18 Jul 2022 14:01)  HR: 61 (19 Jul 2022 13:25) (59 - 72)  BP: 115/76 (19 Jul 2022 13:25) (105/61 - 138/83)  BP(mean): --  RR: 20 (19 Jul 2022 13:25) (16 - 20)  SpO2: 93% (19 Jul 2022 13:25) (91% - 97%)    Parameters below as of 19 Jul 2022 13:25  Patient On (Oxygen Delivery Method): nasal cannula  O2 Flow (L/min): 3        MEDICATIONS:  MEDICATIONS  (STANDING):  bisacodyl 5 milliGRAM(s) Oral once  digoxin     Tablet 250 MICROGram(s) Oral daily  escitalopram 20 milliGRAM(s) Oral daily  furosemide    Tablet 20 milliGRAM(s) Oral daily  metoprolol tartrate 50 milliGRAM(s) Oral every 12 hours  sodium chloride 3%  Inhalation 4 milliLiter(s) Inhalation every 12 hours  traZODone 150 milliGRAM(s) Oral at bedtime    MEDICATIONS  (PRN):  albuterol/ipratropium for Nebulization 3 milliLiter(s) Nebulizer every 6 hours PRN Shortness of Breath    ALLERGIES:  Allergies    No Known Allergies    Intolerances    LABS:                        12.9   7.45  )-----------( 177      ( 19 Jul 2022 05:30 )             40.5     07-19    142  |  106  |  11  ----------------------------<  91  4.0   |  26  |  0.50    Ca    9.5      19 Jul 2022 05:30    TPro  5.9<L>  /  Alb  3.8  /  TBili  0.6  /  DBili  x   /  AST  16  /  ALT  15  /  AlkPhos  72  07-19    PT/INR - ( 19 Jul 2022 05:30 )   PT: 15.5 sec;   INR: 1.30          PTT - ( 19 Jul 2022 05:30 )  PTT:31.4 sec    CAPILLARY BLOOD GLUCOSE          RADIOLOGY & ADDITIONAL TESTS: Reviewed.

## 2022-07-19 NOTE — PROGRESS NOTE ADULT - PROBLEM SELECTOR PLAN 3
Patient with recently diagnosed HF, on metoprolol, lasix at home.  -continue home meds here  -follow bnp  -last tte: There is mild concentric left ventricular hypertrophy. Left ventricular   systolic function is severely reduced with a calculated ejection fraction   of 25-30% with global hypokinesis. Analysis of mitral annular tissue   Doppler, left atrial volume index, and tricuspid regurgitant velocity   reveals: grade III diastolic dysfunction with elevated filling pressure.  - Cardiology for clearance Patient with recently diagnosed HFrEF of 25-30 %  - Continue metoprolol 50 mg q 12  - Continue furosemide 20 mg qd  - TTE on 7/19: Moderatley-to-severely left ventricular systolic function, septal wall thickness increased from ruth on 6/24, mildly dilated right ventircular size, biatrial enlargement, mold-to moderate aortic regurg, moderate mitral regurgitation, mild-to-moderate tricuspid regurgitation, pulm htn present with PASP of 42 mmHG, boderline dilated aortic root, small pericardial effusion; similar findings to 6/24 TTE. Patient with recently diagnosed HFrEF of 25-30 %. Now increased to 30-35%.   - Continue metoprolol 50 mg q 12  - Continue furosemide 20 mg qd  - TTE on 7/19: EF of 30-35%. Moderatley-to-severely left ventricular systolic function, septal wall thickness increased from ruth on 6/24, mildly dilated right ventricular size, biatrial enlargement, mold-to moderate aortic regurgitation, moderate mitral regurgitation, mild-to-moderate tricuspid regurgitation, pulm htn present with PASP of 42 mmHG, borderline dilated aortic root, small pericardial effusion; similar findings to 6/24 TTE.

## 2022-07-19 NOTE — DISCHARGE NOTE PROVIDER - NSDCFUSCHEDAPPT_GEN_ALL_CORE_FT
Marissa Hart  [facility] Physician [address]  Scheduled Appointment: 08/16/2022

## 2022-07-19 NOTE — DISCHARGE NOTE NURSING/CASE MANAGEMENT/SOCIAL WORK - NSDCFUADDAPPT_GEN_ALL_CORE_FT
1. Cardiology  You will need an appointment with your cardiologist Dr. Dickinson in about 2 weeks after your hospital discharge. The office of Dr. Dickinson is aware of your recent hospitalization and will reach out to you in order to arrange an appointment for you. Please bring your hospital discharge paperwork to your appointment.     2. Pulmonology:  You have an appointment with Dr. Marissa Hart on August 16th at 4:15 PM. The office is located at [address] [place] [address] and can be reached at .

## 2022-07-19 NOTE — SWALLOW BEDSIDE ASSESSMENT ADULT - COMMENTS
Received awake & alert, seated upright in bed. Pt reports that if she doesn't take her time, she'll cough when drinking. Pt's dtr present at bedside.

## 2022-07-19 NOTE — SWALLOW BEDSIDE ASSESSMENT ADULT - SWALLOW EVAL: DIAGNOSIS
Fnxl oropharyngeal swallow for soft solids and thin liquids w no overt signs of aspiration on clinical exam.

## 2022-07-19 NOTE — DISCHARGE NOTE PROVIDER - NSDCCAREPROVSEEN_GEN_ALL_CORE_FT
Marissa Hart Marissa Hart A  Patient seen and examined with house-staff during bedside rounds.  Resident note read, including vitals, physical findings, laboratory data, and radiological reports.   Revisions included below.  Direct personal management at bed side and extensive interpretation of the data.  Plan was outlined and discussed in details with the housestaff.  Decision making of high complexity  Action taken for acute disease activity to reflect the level of care provided:  - medication reconciliation  - review laboratory data  Bronchoscopy was performed.  The patient most likely has underlying tracheobronchomalacia that is contributing to the productive cough.  The CT scan postprocedure revealed improvement in the airways but she has very narrowed airways and also consistent with an bronchoscopic finding of tracheobronchomalacia.  The echocardiogram was reviewed.  The patient has swallow evaluation and no evidence of aspiration.  I discussed the condition details with the medical agency provider and the family in details.  Patient will be discharged on antibiotic as the gram stain revealed gram-positive cocci.  Patient is to be followed by cardiology regarding the echocardiographic finding.  Patient will be discharged on Mucomyst and saline 3% every 4 hours and albuterol/ipratropium every 6 hours and as needed basis.  The patient is on CPAP at night at home.

## 2022-07-19 NOTE — DISCHARGE NOTE PROVIDER - PROVIDER TOKENS
FREE:[LAST:[Dickinson],FIRST:[Matias],PHONE:[phone]],FAX:[(   [phone]    -],ADDRESS:[[address] [place] [address]],FOLLOWUP:[2 weeks],ESTABLISHEDPATIENT:[T]],PROVIDER:[TOKEN:[[id number]],SCHEDULEDAPPT:[08/16/2022],SCHEDULEDAPPTTIME:[04:15 PM],ESTABLISHEDPATIENT:[T]]

## 2022-07-19 NOTE — DISCHARGE NOTE PROVIDER - NSDCFUADDAPPT_GEN_ALL_CORE_FT
1. Cardiology  You will need an appointment with your cardiologist Dr. Dickinson in about 2 weeks after your hospital discharge. The office of Dr. Dickinson is aware of your recent hospitalization and will reach out to you in order to arrange an appointment for you. Please bring your hospital discharge paperwork to your appointment.     2. Pulmonology:  You have an appointment with Dr. Marissa Hart on August 16th at 4:15 PM. The office is located at [address] [place] [address] and can be reached at .

## 2022-07-19 NOTE — DISCHARGE NOTE PROVIDER - NSDCMRMEDTOKEN_GEN_ALL_CORE_FT
digoxin 250 mcg (0.25 mg) oral tablet: 1 tab(s) orally once a day  dilTIAZem 120 mg/24 hours oral capsule, extended release: 1 cap(s) orally once a day  Eliquis 5 mg oral tablet: 1 tab(s) orally 2 times a day  escitalopram 20 mg oral tablet: 1 tab(s) orally once a day  furosemide 20 mg oral tablet: 1 tab(s) orally once a day  Lipitor 40 mg oral tablet: 1 tab(s) orally once a day  Metoprolol Tartrate 50 mg oral tablet: 1 tab(s) orally 2 times a day  traZODone 150 mg oral tablet, extended release:    digoxin 250 mcg (0.25 mg) oral tablet: 1 tab(s) orally once a day  Eliquis 5 mg oral tablet: 1 tab(s) orally 2 times a day  escitalopram 20 mg oral tablet: 1 tab(s) orally once a day  furosemide 20 mg oral tablet: 1 tab(s) orally once a day  ipratropium-albuterol 0.5 mg-2.5 mg/3 mL inhalation solution: 3 milliliter(s) inhaled every 6 hours, As needed, Shortness of Breath  Lipitor 40 mg oral tablet: 1 tab(s) orally once a day  Metoprolol Tartrate 50 mg oral tablet: 1 tab(s) orally 2 times a day  sodium chloride 3% inhalation solution: 4 milliliter(s) inhaled every 12 hours  traZODone 150 mg oral tablet, extended release: 1 tab(s) orally once a day (at bedtime)   cefpodoxime 200 mg oral tablet: 1 tab(s) orally every 12 hours  digoxin 250 mcg (0.25 mg) oral tablet: 1 tab(s) orally once a day  Eliquis 5 mg oral tablet: 1 tab(s) orally 2 times a day  escitalopram 20 mg oral tablet: 1 tab(s) orally once a day  furosemide 20 mg oral tablet: 1 tab(s) orally once a day  ipratropium-albuterol 0.5 mg-2.5 mg/3 mL inhalation solution: 3 milliliter(s) inhaled every 6 hours, As needed, Shortness of Breath  Lipitor 40 mg oral tablet: 1 tab(s) orally once a day  Metoprolol Tartrate 50 mg oral tablet: 1 tab(s) orally 2 times a day  sodium chloride 3% inhalation solution: 4 milliliter(s) inhaled every 12 hours  tiotropium 1.25 mcg/inh inhalation aerosol: 2 puff(s) inhaled once a day  tiotropium 2.5 mcg/inh inhalation aerosol: 2 puff(s) inhaled once a day  traZODone 150 mg oral tablet, extended release: 1 tab(s) orally once a day (at bedtime)   cefpodoxime 200 mg oral tablet: 1 tab(s) orally every 12 hours  digoxin 250 mcg (0.25 mg) oral tablet: 1 tab(s) orally once a day  Eliquis 5 mg oral tablet: 1 tab(s) orally 2 times a day  escitalopram 20 mg oral tablet: 1 tab(s) orally once a day  furosemide 40 mg oral tablet: 1 tab(s) orally once a day  ipratropium-albuterol 0.5 mg-2.5 mg/3 mL inhalation solution: 3 milliliter(s) inhaled every 6 hours, As needed, Shortness of Breath  Jardiance 10 mg oral tablet: 1 tab(s) orally once a day (in the morning)  letrozole 2.5 mg oral tablet: 1 tab(s) orally once a day  Lipitor 40 mg oral tablet: 1 tab(s) orally once a day  metFORMIN 500 mg oral tablet: 1 tab(s) orally 2 times a day  metoprolol succinate 100 mg oral capsule, extended release: 1 cap(s) orally once a day  sodium chloride 3% inhalation solution: 4 milliliter(s) inhaled every 12 hours  Spiriva Respimat 1.25 mcg/inh inhalation aerosol: 2 puff(s) inhaled once a day  traZODone 150 mg oral tablet, extended release: 1 tab(s) orally once a day (at bedtime)

## 2022-07-19 NOTE — PROGRESS NOTE ADULT - ASSESSMENT
Patient is a 77 yo G with a PMH of CHF, DM2, CRISTIN (on CPAP), pulm HTN, Permanent Pacemaker, Afib, HLD who presents for a 2 month history of productive cough and moderate SOB. The patient is being admitted for inpatient bronchoscopy 2/2 referral from outpatient physician Dr. Tanner green.

## 2022-07-19 NOTE — DISCHARGE NOTE PROVIDER - HOSPITAL COURSE
#Discharge: do not delete    Patient is a 77 yo F with a PMH of CHF, DM2, CRISTIN (on CPAP), pulm HTN, Permanent Pacemaker, Afib, HLD who presents for a 2 month history of productive cough and moderate SOB admitted for inpatient bronchoscopy 2/2 referral found to have B/L mucus plugging and tracheobronchomalacia.     Hospital course (by problem):     Patient was discharged to: Home    New medications:   Changes to old medications:  Medications that were stopped: Diltiazem     Items to follow up as outpatient: Pulmonology, Cardiology    Physical exam at the time of discharge:       #Discharge: do not delete    Patient is a 75 yo F with a PMH of CHF, DM2, CRISTIN (on CPAP), pulm HTN, Permanent Pacemaker, Afib, HLD who presents for a 2 month history of productive cough and moderate SOB admitted for inpatient bronchoscopy 2/2 referral found to have B/L mucus plugging and tracheobronchomalacia.     Hospital course (by problem):     #History of persistent cough  Likely 2/2 atelectasis and mucus buildup vs reactive airway disease 2/2 COVID 19. Patient has had a 2 month history of non-bloody, persistent cough. Patient denies and CP outside of coughing episodes. XR Chest (7/18): Increased lung markings. Sating appropriately on RA. CT chest from June demonstrating tracheobronchomalacia, large mucous in bronchus, solid nodule RUL, dilated pulm artery, and cardiomegaly.  CT Chest 7/19: IMPRESSION: 1. Persistent mild pulmonary venous congestive changes with interlobular septal thickening, cardiomegaly and mosaic attenuation. 2. Persistent mild cylindrical bronchiectasis. 3. Interval resolution of obstructing mucous plugging within the right lower lobe bronchus and tracheal bifurcation. Improvement in aeration of the right lower lung zone.   - Cont sodium chloride 3% inhalation via nebulizer   - Cont Albuterol prn  - Bronch performed 7/19 with findings of B/L mucus plugging with airway inflammation and friability with tracheobronchomalacia   - f/u Dr. Hart    #SOB (shortness of breath).   Likely 2/2 COVID atelectasis and mucus buildup. SOB occurs primarily during coughing episodes. Will likely improve with treatment of above cough. Patient has no CP or LOC.   - As above recs (-persistent cough hx).    #Chronic heart failure.   Patient with recently diagnosed HF, on metoprolol, lasix at home.  -continue home meds here  -follow bnp  -last tte: There is mild concentric left ventricular hypertrophy. Left ventricular   systolic function is severely reduced with a calculated ejection fraction   of 25-30% with global hypokinesis. Analysis of mitral annular tissue   Doppler, left atrial volume index, and tricuspid regurgitant velocity   reveals: grade III diastolic dysfunction with elevated filling pressure.  - Cardiology for clearance.    #History of hyperlipidemia.   - Continue lipitor 40 QHS.    #Chronic atrial fibrillation.   Patient with history of afib.  -continue with home medications (digoxin, metoprolol, eliquis)  - Diltiazem discontinued  - Cardiology consulted for clearance while admitted, TTE repeated    #CRISTIN on CPAP.   - Continue with CPAP for bedtime.    Patient was discharged to: Home with 24hr     New medications:   Changes to old medications:  Medications that were stopped: Diltiazem     Items to follow up as outpatient: Pulmonology, Cardiology    Physical exam at the time of discharge:       #Discharge: do not delete    Patient is a 77 yo F with a PMH of CHF, DM2, CRISTIN (on CPAP), pulm HTN, Permanent Pacemaker, Afib, HLD who presents for a 2 month history of productive cough and moderate SOB admitted for inpatient bronchoscopy 2/2 referral found to have B/L mucus plugging and tracheobronchomalacia.     Hospital course (by problem):     #History of persistent cough  Likely 2/2 atelectasis and mucus buildup vs reactive airway disease 2/2 COVID 19. Patient has had a 2 month history of non-bloody, persistent cough. Patient denies and CP outside of coughing episodes. XR Chest (7/18): Increased lung markings. Sating appropriately on RA. CT chest from June demonstrating tracheobronchomalacia, large mucous in bronchus, solid nodule RUL, dilated pulm artery, and cardiomegaly.  CT Chest 7/19: IMPRESSION: 1. Persistent mild pulmonary venous congestive changes with interlobular septal thickening, cardiomegaly and mosaic attenuation. 2. Persistent mild cylindrical bronchiectasis. 3. Interval resolution of obstructing mucous plugging within the right lower lobe bronchus and tracheal bifurcation. Improvement in aeration of the right lower lung zone.   - Cont sodium chloride 3% inhalation via nebulizer   - Cont Albuterol prn  - Bronch performed 7/19 with findings of B/L mucus plugging with airway inflammation and friability with tracheobronchomalacia   - f/u Dr. Hart    #SOB (shortness of breath).   Likely 2/2 COVID atelectasis and mucus buildup. SOB occurs primarily during coughing episodes. Will likely improve with treatment of above cough. Patient has no CP or LOC.   - As above recs (-persistent cough hx).    #Chronic heart failure.   Patient with recently diagnosed HF, on metoprolol, lasix at home.  -continue home meds here  -follow bnp  -last tte: There is mild concentric left ventricular hypertrophy. Left ventricular  systolic function is severely reduced with a calculated ejection fraction  of 25-30% with global hypokinesis. Analysis of mitral annular tissue. Doppler, left atrial volume index, and tricuspid regurgitant velocity  reveals: grade III diastolic dysfunction with elevated filling pressure.  - Cardiology for clearance.    #History of hyperlipidemia.   - Continue lipitor 40 QHS.    #Chronic atrial fibrillation.   Patient with history of afib.  -continue with home medications (digoxin, metoprolol, eliquis)  - Diltiazem discontinued  - Cardiology consulted for clearance while admitted, TTE repeated    #CRISTIN on CPAP.   - Continue with CPAP for bedtime.    Patient was discharged to: Home with 24hr     New medications:   Changes to old medications:  Medications that were stopped: Diltiazem     Items to follow up as outpatient: Pulmonology, Cardiology    Physical exam at the time of discharge:       #Discharge: do not delete    Patient is a 75 yo F with a PMH of CHF, DM2, CRISTIN (on CPAP), pulm HTN, Permanent Pacemaker, Afib, HLD who presents for a 2 month history of productive cough and moderate SOB admitted for inpatient bronchoscopy 2/2 referral found to have B/L mucus plugging and tracheobronchomalacia.     Hospital course (by problem):     #History of persistent cough  Likely 2/2 atelectasis and mucus buildup vs reactive airway disease 2/2 COVID 19. Patient has had a 2 month history of non-bloody, persistent cough. Patient denies and CP outside of coughing episodes. XR Chest (7/18): Increased lung markings. Sating appropriately on RA. CT chest from June demonstrating tracheobronchomalacia, large mucous in bronchus, solid nodule RUL, dilated pulm artery, and cardiomegaly.  CT Chest 7/19: IMPRESSION: 1. Persistent mild pulmonary venous congestive changes with interlobular septal thickening, cardiomegaly and mosaic attenuation. 2. Persistent mild cylindrical bronchiectasis. 3. Interval resolution of obstructing mucous plugging within the right lower lobe bronchus and tracheal bifurcation. Improvement in aeration of the right lower lung zone.   - Cont sodium chloride 3% inhalation duonebs via nebulizer   - Cont pulmonary toilet  - Bronch performed 7/19 with findings of B/L mucus plugging with airway inflammation and friability with tracheobronchomalacia   - f/u Dr. Hart outpatient  - Cont spiriva inhaler     #Positive sputum Cx  Sputum culture growing gram+ cocci in pairs, final culture and speciation pending  - Cont Cefpodoxime 200mg BID through 7/24   - F/U Dr. Hart outpatient    #SOB (shortness of breath).   Likely 2/2 COVID atelectasis and mucus buildup. SOB occurs primarily during coughing episodes. Will likely improve with treatment of above cough. Patient has no CP or LOC.   - As above recs (-persistent cough hx).    #Chronic heart failure.   Patient with recently diagnosed HF, on metoprolol, lasix at home. last tte: There is mild concentric left ventricular hypertrophy. Left ventricular  systolic function is severely reduced with a calculated ejection fraction  of 25-30% with global hypokinesis. Analysis of mitral annular tissue. Doppler, left atrial volume index, and tricuspid regurgitant velocity  reveals: grade III diastolic dysfunction with elevated filling pressure.On admission, Cardiolgoy was consulted for clearance prior to bronch in which diltiazem was discontinued. TTE was also performed with findings of EF 30-35%.   - Metoprolol tartrate changed to Toprol 100mg daily  - Discontinue Diltiazem  - Pt to f/u with Dr. Dickinson within 2 weeks of discharge to determine when appropriate to start ACE and MRA. Pt will also need a workup for heat failure etiology    #History of hyperlipidemia.   - Continue lipitor 40 QHS.    #Chronic atrial fibrillation.   Patient with history of afib.  - continue with home medications (digoxin, metoprolol, eliquis)  - Diltiazem discontinued    #CRISTIN on CPAP.   - Continue with CPAP for bedtime.    Patient was discharged to: Home with 24hr     New medications: Cefpodxime 200mg BID (7/19-7/24)  Changes to old medications: none  Medications that were stopped: Diltiazem     Items to follow up as outpatient: Pulmonology, Cardiology (ACE/ARB & MRA initiation and HF etiology Workup)    Physical exam at the time of discharge:  Constitutional: Resting comfortably in bed; NAD  Eyes: anicteric sclera  ENT: no nasal discharge  Respiratory: CTA B/L; no W/R/R, no retractions  Cardiac: +S1/S2; RRR; no M/R/G; PMI non-displaced  Gastrointestinal: abdomen soft, NT/ND; no rebound or guarding; +BSx4  Extremities: no peripheral edema  Musculoskeletal: NROM x4; no joint swelling, tenderness or erythema  Vascular: 2+ radial, femoral, DP/PT pulses B/L  Dermatologic: skin warm, dry and intact; no rashes, wounds, or scars  Neurologic: AAOx3; CNII-XII grossly intact; no focal deficits

## 2022-07-19 NOTE — DISCHARGE NOTE NURSING/CASE MANAGEMENT/SOCIAL WORK - PATIENT PORTAL LINK FT
You can access the FollowMyHealth Patient Portal offered by [facility] by registering at the following website: [email or web address]. By joining [facility]’s FollowMyHealth portal, you will also be able to view your health information using other applications (apps) compatible with our system.

## 2022-07-19 NOTE — DISCHARGE NOTE PROVIDER - NSDCCPTREATMENT_GEN_ALL_CORE_FT
PRINCIPAL PROCEDURE  Procedure: Bronchoscopy  Findings and Treatment:       SECONDARY PROCEDURE  Procedure: Transthoracic echocardiography (TTE)  Findings and Treatment:     Procedure: CT chest wo con  Findings and Treatment:      PRINCIPAL PROCEDURE  Procedure: Bronchoscopy  Findings and Treatment: Diffuse mucus observed throughout with inflammation and friability of the airways and tracheobronchomalacia.      SECONDARY PROCEDURE  Procedure: Transthoracic echocardiography (TTE)  Findings and Treatment: CONCLUSIONS:   1. Moderately-to-severely reduced left ventricular systolic function.   2. Septal wall thickness is increased; off-axis imaging precludes   accurate measurements.   3. Mildly dilated right ventricular size. Mildly reduced right   ventricular systolic function.   4. Biatrial enlargement.   5. Mild-to-moderate aortic regurgitation.   6. Moderate mitral regurgitation.   7. Mild-to-moderate tricuspid regurgitation.   8. Pulmonary hypertension present, pulmonary artery systolic pressure is   42 mmHg.   9. Borderline dilated aortic root.  10. Small pericardial effusion.  11. Compared to the previous TTE performed on 6/24/2022, overall findings   are similar. Pericardialeffusion may be slightly more pronounced.   Left   ventricular systolic function is moderately-to-severely reduced with a   calculated ejection fraction of 30-35% with globalhypokinesis and   regional variation      Procedure: CT chest wo con  Findings and Treatment: IMPRESSION:  1. Persistent mild pulmonary venous congestive changes with interlobular   septal thickening, cardiomegaly and mosaic attenuation.  2. Persistent mild cylindrical bronchiectasis.  3. Interval resolution of obstructing mucous plugging within the right   lower lobe bronchus and tracheal bifurcation. Improvement in aeration of   the right lower lung zone.  4. Stable 21 x 18 mm round low-attenuation (fluid) intraparenchymal   structure which demonstrates acute angles with the adjacent chest wall   essentially unchanged allowing for differences in imaging technique from   examination dated 3/5/2012. In view of its stability this may be a benign   entity and abbreviated differential includes an intraparenchymal   bronchogenic duplication cyst. As prior PET/CT imaging dated 3/5/2012 is   of limited comparative value a follow-up whole-body PET/CT is suggested   for more complete evaluation and to exclude slow-growing neoplasm.  5. Aneurysmal dilatation of the thoracic aorta measuring up to 4.1 cm in   the ascending segment.  6. Persistent dilatation of the main pulmonary artery measuring 4.1 cm.   Pulmonary hypertension cannot be excluded.

## 2022-07-19 NOTE — PROGRESS NOTE ADULT - PROBLEM SELECTOR PLAN 2
Likely 2/2 COVID atelectasis and mucus buildup. SOB occurs primarily during coughing episodes. Will likely improve with treatment of above cough. Patient has no CP or LOC.   - See above recs (-persistent cough hx)

## 2022-07-19 NOTE — SWALLOW BEDSIDE ASSESSMENT ADULT - NS SPL SWALLOW CLINIC TRIAL FT
Pt reports intermittent coughing with thins when swallowing medications and drinking from a bottle. She was advised to take cup sips and to keep her head in a neutral position (vs tilting it back with bottle drinking) to improve airway safety. Instructions repeated back by Pt and dtr.

## 2022-07-19 NOTE — PROGRESS NOTE ADULT - PROBLEM SELECTOR PLAN 5
Patient with history of afib.  -continue with home medications (digoxin, metoprolol, eliquis)  -continue with diltiazem 120   -will obtain cardiology clearance prior to bronch. Patient with history of afib.  - Continue with Metoprolol 50 mg q 12  - Continue with digoxin 250 mg qd  - Currently not on Eliquis due to bronchoscopy    - D/c diltiazem 120

## 2022-07-19 NOTE — SWALLOW BEDSIDE ASSESSMENT ADULT - PHARYNGEAL PHASE
Hyolaryngeal excursion palpated during swallow trigger & appears brisk & timely. There is no change in voice, throat clear or cough after PO trials.

## 2022-07-20 LAB
NIGHT BLUE STAIN TISS: SIGNIFICANT CHANGE UP
SPECIMEN SOURCE: SIGNIFICANT CHANGE UP

## 2022-07-20 RX ORDER — METOPROLOL TARTRATE 50 MG
1 TABLET ORAL
Qty: 30 | Refills: 1
Start: 2022-07-20 | End: 2022-09-17

## 2022-07-20 RX ORDER — CEFPODOXIME PROXETIL 100 MG
1 TABLET ORAL
Qty: 10 | Refills: 0
Start: 2022-07-20 | End: 2022-07-24

## 2022-07-21 PROBLEM — Z00.00 ENCOUNTER FOR PREVENTIVE HEALTH EXAMINATION: Status: ACTIVE | Noted: 2022-07-21

## 2022-07-21 LAB — CULTURE RESULTS: SIGNIFICANT CHANGE UP

## 2022-08-16 ENCOUNTER — APPOINTMENT (OUTPATIENT)
Dept: PULMONOLOGY | Facility: CLINIC | Age: 77
End: 2022-08-16

## 2022-08-16 VITALS
TEMPERATURE: 97.4 F | RESPIRATION RATE: 12 BRPM | HEART RATE: 63 BPM | OXYGEN SATURATION: 90 % | HEIGHT: 60 IN | DIASTOLIC BLOOD PRESSURE: 66 MMHG | SYSTOLIC BLOOD PRESSURE: 99 MMHG

## 2022-08-16 PROCEDURE — 99213 OFFICE O/P EST LOW 20 MIN: CPT

## 2022-08-17 PROBLEM — G47.33 OBSTRUCTIVE SLEEP APNEA (ADULT) (PEDIATRIC): Chronic | Status: ACTIVE | Noted: 2022-07-18

## 2022-08-17 PROBLEM — I10 ESSENTIAL (PRIMARY) HYPERTENSION: Chronic | Status: ACTIVE | Noted: 2022-07-18

## 2022-08-17 PROBLEM — E11.9 TYPE 2 DIABETES MELLITUS WITHOUT COMPLICATIONS: Chronic | Status: ACTIVE | Noted: 2022-07-18

## 2022-08-17 PROBLEM — I50.9 HEART FAILURE, UNSPECIFIED: Chronic | Status: ACTIVE | Noted: 2022-07-18

## 2022-08-17 PROBLEM — Z95.0 PRESENCE OF CARDIAC PACEMAKER: Chronic | Status: ACTIVE | Noted: 2022-07-18

## 2022-08-17 PROBLEM — E78.5 HYPERLIPIDEMIA, UNSPECIFIED: Chronic | Status: ACTIVE | Noted: 2022-07-18

## 2022-08-17 LAB
CULTURE RESULTS: SIGNIFICANT CHANGE UP
SPECIMEN SOURCE: SIGNIFICANT CHANGE UP

## 2022-08-21 NOTE — ASSESSMENT
[FreeTextEntry1] : #Tracheobronchomalacia\par \par Patient doing significantly better after bronchoscopy last month with re-expansion of right lower lobe that had extensive endobronchial mucus expansion. Continues to use nebulizer 4 times daily with significant improvement in cough. Discussed with patient she needs to continue on all current nebulizers and inhalers as she has a high predilection for mucus plugging. Explained that she can stop using the chest flutter device if she would like but to have it on hand. If her symptoms worsen the she will need to restart that. \par \par #CRISTIN\par \par Continue to use CPAP. Tolerating it well. \par \par Follow up in 2 months

## 2022-08-21 NOTE — HISTORY OF PRESENT ILLNESS
[Never] : never [TextBox_4] : Mrs. Ospina presents today with her daughter. She is better and using the nebulizer.  She is concerned about the frequent use on inhalers.   She is sob and she saw the cardiologist.  She was started on Entersto and Aldactone.  She is doing for the last 4 weeks .  She is coughing less and sputum is less.  She is using the chest device and cpap.  she wants if she still need the vest.  She did not start rehab.  She is off the pxygen

## 2022-09-09 LAB
CULTURE RESULTS: SIGNIFICANT CHANGE UP
SPECIMEN SOURCE: SIGNIFICANT CHANGE UP

## 2022-10-25 ENCOUNTER — APPOINTMENT (OUTPATIENT)
Dept: PULMONOLOGY | Facility: CLINIC | Age: 77
End: 2022-10-25

## 2022-10-25 VITALS
OXYGEN SATURATION: 90 % | HEIGHT: 60 IN | HEART RATE: 70 BPM | TEMPERATURE: 97.9 F | SYSTOLIC BLOOD PRESSURE: 100 MMHG | BODY MASS INDEX: 29.84 KG/M2 | DIASTOLIC BLOOD PRESSURE: 64 MMHG | WEIGHT: 152 LBS

## 2022-10-25 DIAGNOSIS — Z23 ENCOUNTER FOR IMMUNIZATION: ICD-10-CM

## 2022-10-25 PROCEDURE — 99213 OFFICE O/P EST LOW 20 MIN: CPT

## 2022-10-25 RX ORDER — TIOTROPIUM BROMIDE INHALATION SPRAY 1.56 UG/1
1.25 SPRAY, METERED RESPIRATORY (INHALATION) DAILY
Qty: 5 | Refills: 5 | Status: ACTIVE | COMMUNITY
Start: 2022-10-25 | End: 1900-01-01

## 2022-10-25 RX ORDER — TIOTROPIUM BROMIDE INHALATION SPRAY 3.12 UG/1
2.5 SPRAY, METERED RESPIRATORY (INHALATION) DAILY
Qty: 1 | Refills: 11 | Status: ACTIVE | COMMUNITY
Start: 2022-06-16 | End: 1900-01-01

## 2022-10-25 NOTE — ASSESSMENT
[FreeTextEntry1] : Sleep apnea\par \par The patient is compliant with the CPAP and tolerated well.  The patient uses a nasal CPAP.\par \par Tracheobronchomalacia\par \par The patient is stable and improved on the bronchodilator.  The cough decreased and also the amount of sputum production.  I will stop the Mucomyst first and if tolerated well over the next 2 weeks then we will discontinue saline.  She is to continue on the Stiolto and the nebulizer treatment.  No clinical evidence for infection at this point\par \par Chronic respiratory failure with hypoxemia\par \par The patient is off the oxygen his saturation in above 90s.  The patient is to continue on oxygen as needed basis\par \par Chronic cough\par \par Is related to the tracheobronchomalacia\par \par Pulmonary nodule\par \par Follow-up on the repeat CT scan

## 2022-10-25 NOTE — HISTORY OF PRESENT ILLNESS
[Never] : never [TextBox_4] : Is doing very well.  She uses a nebulizer.  The cough is less.  Amount of sputum is less no blood.  She using her CPAP mask every night and tolerated it very well [ESS] : 0

## 2022-12-08 ENCOUNTER — APPOINTMENT (OUTPATIENT)
Dept: CT IMAGING | Facility: HOSPITAL | Age: 77
End: 2022-12-08

## 2022-12-08 ENCOUNTER — OUTPATIENT (OUTPATIENT)
Dept: OUTPATIENT SERVICES | Facility: HOSPITAL | Age: 77
LOS: 1 days | End: 2022-12-08
Payer: MEDICARE

## 2022-12-08 PROCEDURE — 71250 CT THORAX DX C-: CPT | Mod: 26,MH

## 2022-12-08 PROCEDURE — 71250 CT THORAX DX C-: CPT

## 2022-12-11 ENCOUNTER — TRANSCRIPTION ENCOUNTER (OUTPATIENT)
Age: 77
End: 2022-12-11

## 2023-01-06 ENCOUNTER — APPOINTMENT (OUTPATIENT)
Dept: PULMONOLOGY | Facility: CLINIC | Age: 78
End: 2023-01-06
Payer: MEDICARE

## 2023-01-06 PROCEDURE — 99443: CPT | Mod: 95

## 2023-01-06 NOTE — ASSESSMENT
[FreeTextEntry1] : I discussed the case in details with the patient's daughter.  She informed me she did not get the [facility] sent on 12/9.  I reviewed the CT scan of chest with her and nodule is 19 mm instead 21 mm.  Can not rule out cancer.  PET scan and possible CT Bx

## 2023-01-06 NOTE — REASON FOR VISIT
[Home] : at home, [unfilled] , at the time of the visit. [Medical Office: ([facility])___] : at the medical office located in  [Verbal consent obtained from patient] : the patient, [unfilled] [Follow-Up] : a follow-up visit [Pulmonary Nodules] : pulmonary nodules

## 2023-01-18 ENCOUNTER — APPOINTMENT (OUTPATIENT)
Dept: NUCLEAR MEDICINE | Facility: HOSPITAL | Age: 78
End: 2023-01-18
Payer: MEDICARE

## 2023-01-18 ENCOUNTER — OUTPATIENT (OUTPATIENT)
Dept: OUTPATIENT SERVICES | Facility: HOSPITAL | Age: 78
LOS: 1 days | End: 2023-01-18
Payer: MEDICARE

## 2023-01-18 LAB — GLUCOSE BLDC GLUCOMTR-MCNC: 116 MG/DL — HIGH (ref 70–99)

## 2023-01-18 PROCEDURE — A9552: CPT

## 2023-01-18 PROCEDURE — 78815 PET IMAGE W/CT SKULL-THIGH: CPT

## 2023-01-18 PROCEDURE — 82962 GLUCOSE BLOOD TEST: CPT

## 2023-01-18 PROCEDURE — 78815 PET IMAGE W/CT SKULL-THIGH: CPT | Mod: 26,PI,MH

## 2023-01-27 ENCOUNTER — APPOINTMENT (OUTPATIENT)
Dept: PULMONOLOGY | Facility: CLINIC | Age: 78
End: 2023-01-27

## 2023-02-07 ENCOUNTER — APPOINTMENT (OUTPATIENT)
Dept: PULMONOLOGY | Facility: CLINIC | Age: 78
End: 2023-02-07
Payer: MEDICARE

## 2023-02-07 VITALS
WEIGHT: 150 LBS | OXYGEN SATURATION: 95 % | HEART RATE: 61 BPM | DIASTOLIC BLOOD PRESSURE: 52 MMHG | HEIGHT: 60 IN | SYSTOLIC BLOOD PRESSURE: 96 MMHG | BODY MASS INDEX: 29.45 KG/M2

## 2023-02-07 DIAGNOSIS — R05.3 CHRONIC COUGH: ICD-10-CM

## 2023-02-07 PROCEDURE — 99214 OFFICE O/P EST MOD 30 MIN: CPT

## 2023-02-07 NOTE — HISTORY OF PRESENT ILLNESS
[Fatigue] : fatigue [CPAP:] : CPAP [Nasal mask] : nasal mask [Never] : never [TextBox_4] : she is doing well.  She does not like the inhalers.  She is coughing less.  She is using the cpap very night about 8 hr.  She is weak. The cough is less [Awakes Unrefreshed] : does not awaken unrefreshed [Awakes with Dry Mouth] : does not awaken with dry mouth [Awakes with Headache] : does not awaken with headache [Daytime Somnolence] : denies daytime somnolence [Difficulty Initiating Sleep] : does not have difficulty initiating sleep [ESS] : 0

## 2023-02-07 NOTE — ASSESSMENT
[FreeTextEntry1] : Obstructive sleep apnea\par \par The patient is compliant with the CPAP mask.  The patient uses a mask for about 8 hours a day.  She used a [employer] fire.  The CPAP is tolerated well.  Patient had a repeat echocardiogram which revealed improvement in right ventricular function and no evidence of pulmonary hypertension\par \par Chronic respiratory failure with hypoxemia secondary to chronic cough and tracheobronchomalacia\par \par The patient is clinically stable and the baseline oxygen saturation is stable.  The patient is off oxygen during her office visit.  The patient improved with the nebulizer including cystitis is staying 3% saline and ipratropium with albuterol.  The chest exam was unremarkable.  I reviewed the CT scan of the chest and the last PET scan and his improvement on lung volumes.  Even the patient clinically feels better on the current regimen.\par \par Pulmonary nodule\par \par Patient is scheduled for a CT scan in 1 year.  PET scan was negative.\par \par I discussed the case in detail with the daughter and explained that the patient is improving with the bronchodilator with improvement even in the images.  Patient is to continue on the current regimen I will follow-up in 3 months

## 2023-02-09 ENCOUNTER — NON-APPOINTMENT (OUTPATIENT)
Age: 78
End: 2023-02-09

## 2023-03-06 ENCOUNTER — TRANSCRIPTION ENCOUNTER (OUTPATIENT)
Age: 78
End: 2023-03-06

## 2023-05-18 ENCOUNTER — APPOINTMENT (OUTPATIENT)
Dept: PULMONOLOGY | Facility: CLINIC | Age: 78
End: 2023-05-18
Payer: MEDICARE

## 2023-05-18 PROCEDURE — 99443: CPT | Mod: 95

## 2023-05-18 NOTE — HISTORY OF PRESENT ILLNESS
[Never] : never [TextBox_4] : she is doing better since she had the surgery on the ankle and at home now.  She is doing PT.  She is only use the oxygen as needed basis and her sat is >93-97% off oxygen.  She sleeps with the cpap every night and is compliant with it.  Very well in general.  Her appetite is good. [ESS] : 0

## 2023-05-18 NOTE — ASSESSMENT
[FreeTextEntry1] : Tracheobronchomalacia  The patient is clinically stable and doing well.  The patient is using the saline spray and Mucomyst twice a day and the nebulizer treatment with combination of ipratropium and albuterol for 3-4 times a day.  The patient is not using the oxygen during the day and she is compliant with the CPAP mask and night.  The patient exercise capacity improved and oxygen saturation as per the family is ranging between 93 to 97% on room air.  The patient is stable postoperatively and she is off full dose of anticoagulation with apixaban 5 mg twice a day.  The chest x-ray was reviewed with the daughter and unchanged from January 2022 and was consistent with fluid overload.  The patient is on diuretics and is followed by cardiology  Patient is compliant with the CPAP and tolerated it well.

## 2023-05-18 NOTE — REASON FOR VISIT
[Home] : at home, [unfilled] , at the time of the visit. [Medical Office: ([facility])___] : at the medical office located in  [Family Member] : family member [Verbal consent obtained from patient] : the patient, [unfilled] [Follow-Up] : a follow-up visit [Shortness of Breath] : shortness of breath

## 2023-06-06 ENCOUNTER — APPOINTMENT (OUTPATIENT)
Dept: UROLOGY | Facility: CLINIC | Age: 78
End: 2023-06-06
Payer: MEDICARE

## 2023-06-06 VITALS
DIASTOLIC BLOOD PRESSURE: 55 MMHG | SYSTOLIC BLOOD PRESSURE: 99 MMHG | TEMPERATURE: 98 F | OXYGEN SATURATION: 93 % | HEART RATE: 64 BPM

## 2023-06-06 DIAGNOSIS — R33.9 RETENTION OF URINE, UNSPECIFIED: ICD-10-CM

## 2023-06-06 PROCEDURE — 99204 OFFICE O/P NEW MOD 45 MIN: CPT

## 2023-06-13 NOTE — HISTORY OF PRESENT ILLNESS
[FreeTextEntry1] : 77-year-old woman with a recent fall with ankle fracture 2 months status post surgery with 3 plates to repair fracture.  She is unable to ambulate, currently has just started physical therapy.  She has been in retention since 1 month postoperatively.  She had failed 1 trial of void.  She denies any antecedent voiding issues.\par \par

## 2023-06-13 NOTE — PHYSICAL EXAM
[General Appearance - Well Developed] : well developed [General Appearance - Well Nourished] : well nourished [Normal Appearance] : normal appearance [Well Groomed] : well groomed [] : no respiratory distress [General Appearance - In No Acute Distress] : no acute distress [Respiration, Rhythm And Depth] : normal respiratory rhythm and effort [Exaggerated Use Of Accessory Muscles For Inspiration] : no accessory muscle use [FreeTextEntry1] : Wheelchair-bound [Oriented To Time, Place, And Person] : oriented to person, place, and time

## 2023-06-13 NOTE — ASSESSMENT
[FreeTextEntry1] : \par \par Impression/plan: 77-year-old woman who is 2 months status post fall with fracture of ankle status post repair of ankle with plates.  She has had urinary tension for 1 month postoperatively.  Given immobility, I encouraged the patient to continue with physical therapy when she is up and moving and ambulating, I told her at that point we can repeat a trial of void in hopes of her being able to empty her bladder and obviate the need for Ferrer catheter.  All questions answered.

## 2023-06-30 ENCOUNTER — APPOINTMENT (OUTPATIENT)
Dept: PULMONOLOGY | Facility: CLINIC | Age: 78
End: 2023-06-30
Payer: MEDICARE

## 2023-06-30 PROCEDURE — 99443: CPT | Mod: 95

## 2023-06-30 NOTE — REASON FOR VISIT
[Home] : at home, [unfilled] , at the time of the visit. [Medical Office: ([facility])___] : at the medical office located in  [Family Member] : family member [Verbal consent obtained from patient] : the patient, [unfilled]

## 2023-06-30 NOTE — ASSESSMENT
[FreeTextEntry1] : Obstructive sleep apnea chronic hypoxic respiratory failure and tracheobronchomalacia\par \par The patient was recently admitted to [facility] with aspiration pneumonia.  The patient improved and was discharged home.  The patient is requiring intermittent oxygen.  The patient is compliant with the CPAP at night.  I discussed the condition with the daughter and the healthcare provider in the community.  The patient is at aspiration risk.  I gave the family instruction about swallowing.  She is to be in the upright position in the chair small portions and slowly swallowing.  Follow-up with swallow evaluation.  I will follow-up on the chest vest to improve her pulmonary toilet.  I informed the care provided down my concern with the CoughAssist to tracheobronchomalacia and might worsen her obstructive sleep apnea

## 2023-06-30 NOTE — HISTORY OF PRESENT ILLNESS
[Never] : never [TextBox_4] : she is doing better and she is compliant with meds.  The only problem is aspiration pneumonia.  As per the daughter she has a swallowing problem.  She had suddenly fever with desaturation and chaking.  She did not tolerate CPAP but she did better and discharged from [facility].  She was admitted for one week.  She is improving with normal oxygen level. She is at home.  She is on cpap at night and on nebs  [ESS] : 0

## 2023-07-11 ENCOUNTER — APPOINTMENT (OUTPATIENT)
Dept: UROLOGY | Facility: CLINIC | Age: 78
End: 2023-07-11

## 2023-08-02 ENCOUNTER — OUTPATIENT (OUTPATIENT)
Dept: OUTPATIENT SERVICES | Facility: HOSPITAL | Age: 78
LOS: 1 days | End: 2023-08-02
Payer: MEDICARE

## 2023-08-02 ENCOUNTER — APPOINTMENT (OUTPATIENT)
Dept: RADIOLOGY | Facility: HOSPITAL | Age: 78
End: 2023-08-02
Payer: MEDICARE

## 2023-08-02 ENCOUNTER — RESULT REVIEW (OUTPATIENT)
Age: 78
End: 2023-08-02

## 2023-08-02 PROCEDURE — 74230 X-RAY XM SWLNG FUNCJ C+: CPT

## 2023-08-02 PROCEDURE — 92611 MOTION FLUOROSCOPY/SWALLOW: CPT | Mod: GN

## 2023-08-02 PROCEDURE — 74230 X-RAY XM SWLNG FUNCJ C+: CPT | Mod: 26

## 2023-08-09 ENCOUNTER — TRANSCRIPTION ENCOUNTER (OUTPATIENT)
Age: 78
End: 2023-08-09

## 2023-08-11 ENCOUNTER — APPOINTMENT (OUTPATIENT)
Dept: PULMONOLOGY | Facility: CLINIC | Age: 78
End: 2023-08-11

## 2023-08-28 ENCOUNTER — APPOINTMENT (OUTPATIENT)
Dept: PULMONOLOGY | Facility: CLINIC | Age: 78
End: 2023-08-28
Payer: MEDICARE

## 2023-08-28 ENCOUNTER — APPOINTMENT (OUTPATIENT)
Dept: SLEEP CENTER | Facility: HOME HEALTH | Age: 78
End: 2023-08-28

## 2023-08-28 VITALS
SYSTOLIC BLOOD PRESSURE: 101 MMHG | DIASTOLIC BLOOD PRESSURE: 62 MMHG | OXYGEN SATURATION: 88 % | WEIGHT: 150 LBS | HEIGHT: 60 IN | BODY MASS INDEX: 29.45 KG/M2 | HEART RATE: 76 BPM

## 2023-08-28 PROCEDURE — 99213 OFFICE O/P EST LOW 20 MIN: CPT

## 2023-08-28 PROCEDURE — 99285 EMERGENCY DEPT VISIT HI MDM: CPT

## 2023-08-28 NOTE — HISTORY OF PRESENT ILLNESS
[Never] : never [TextBox_4] : she was doing fine and using the nebulizers twice a day with three medications until recently she started coughing for the last 12 days.  the sputum is white.  No fever.  No coughing blood.  her appetite good.  She is not losing weight [ESS] : 0

## 2023-08-28 NOTE — ASSESSMENT
[FreeTextEntry1] : The cough is most likely related to her bronchiectasis.  The patient has been stable for quite some time.  She is compliant with the chest pressure twice a day and also the nebulizer treatment including albuterol ipratropium acetylcysteine and saline.  We will send sputum for culture.  Give the patient cefdinir to take once the culture is available.  Obstructive sleep apnea  I will follow with  the sleep study as she is due for prescription the machine is more than 5 years ago.  I renewed the inhalers

## 2023-08-29 ENCOUNTER — INPATIENT (INPATIENT)
Facility: HOSPITAL | Age: 78
LOS: 2 days | Discharge: HOME HEALTH | DRG: 871 | End: 2023-09-01
Payer: MEDICARE

## 2023-08-29 VITALS
HEIGHT: 63 IN | HEART RATE: 131 BPM | DIASTOLIC BLOOD PRESSURE: 52 MMHG | SYSTOLIC BLOOD PRESSURE: 90 MMHG | RESPIRATION RATE: 26 BRPM | OXYGEN SATURATION: 85 % | WEIGHT: 160.06 LBS | TEMPERATURE: 100 F

## 2023-08-29 DIAGNOSIS — I50.9 HEART FAILURE, UNSPECIFIED: ICD-10-CM

## 2023-08-29 DIAGNOSIS — I48.20 CHRONIC ATRIAL FIBRILLATION, UNSPECIFIED: ICD-10-CM

## 2023-08-29 DIAGNOSIS — J69.0 PNEUMONITIS DUE TO INHALATION OF FOOD AND VOMIT: ICD-10-CM

## 2023-08-29 DIAGNOSIS — Z85.3 PERSONAL HISTORY OF MALIGNANT NEOPLASM OF BREAST: ICD-10-CM

## 2023-08-29 DIAGNOSIS — Z29.9 ENCOUNTER FOR PROPHYLACTIC MEASURES, UNSPECIFIED: ICD-10-CM

## 2023-08-29 DIAGNOSIS — E78.5 HYPERLIPIDEMIA, UNSPECIFIED: ICD-10-CM

## 2023-08-29 DIAGNOSIS — J96.01 ACUTE RESPIRATORY FAILURE WITH HYPOXIA: ICD-10-CM

## 2023-08-29 DIAGNOSIS — F41.9 ANXIETY DISORDER, UNSPECIFIED: ICD-10-CM

## 2023-08-29 DIAGNOSIS — E11.9 TYPE 2 DIABETES MELLITUS WITHOUT COMPLICATIONS: ICD-10-CM

## 2023-08-29 DIAGNOSIS — A41.9 SEPSIS, UNSPECIFIED ORGANISM: ICD-10-CM

## 2023-08-29 DIAGNOSIS — G47.33 OBSTRUCTIVE SLEEP APNEA (ADULT) (PEDIATRIC): ICD-10-CM

## 2023-08-29 LAB
ALBUMIN SERPL ELPH-MCNC: 4.1 G/DL — SIGNIFICANT CHANGE UP (ref 3.3–5)
ALBUMIN SERPL ELPH-MCNC: 4.2 G/DL — SIGNIFICANT CHANGE UP (ref 3.3–5)
ALP SERPL-CCNC: 101 U/L — SIGNIFICANT CHANGE UP (ref 40–120)
ALP SERPL-CCNC: 98 U/L — SIGNIFICANT CHANGE UP (ref 40–120)
ALT FLD-CCNC: 13 U/L — SIGNIFICANT CHANGE UP (ref 10–45)
ALT FLD-CCNC: SIGNIFICANT CHANGE UP (ref 10–45)
ANION GAP SERPL CALC-SCNC: 10 MMOL/L — SIGNIFICANT CHANGE UP (ref 5–17)
ANION GAP SERPL CALC-SCNC: 14 MMOL/L — SIGNIFICANT CHANGE UP (ref 5–17)
ANISOCYTOSIS BLD QL: SLIGHT — SIGNIFICANT CHANGE UP
APPEARANCE UR: CLEAR — SIGNIFICANT CHANGE UP
APPEARANCE UR: CLEAR — SIGNIFICANT CHANGE UP
APTT BLD: 32.7 SEC — SIGNIFICANT CHANGE UP (ref 24.5–35.6)
APTT BLD: 33.1 SEC — SIGNIFICANT CHANGE UP (ref 24.5–35.6)
AST SERPL-CCNC: 13 U/L — SIGNIFICANT CHANGE UP (ref 10–40)
AST SERPL-CCNC: SIGNIFICANT CHANGE UP (ref 10–40)
BASE EXCESS BLDV CALC-SCNC: 1.2 MMOL/L — SIGNIFICANT CHANGE UP (ref -2–3)
BASOPHILS # BLD AUTO: 0 K/UL — SIGNIFICANT CHANGE UP (ref 0–0.2)
BASOPHILS # BLD AUTO: 0.06 K/UL — SIGNIFICANT CHANGE UP (ref 0–0.2)
BASOPHILS NFR BLD AUTO: 0 % — SIGNIFICANT CHANGE UP (ref 0–2)
BASOPHILS NFR BLD AUTO: 0.4 % — SIGNIFICANT CHANGE UP (ref 0–2)
BILIRUB SERPL-MCNC: 0.4 MG/DL — SIGNIFICANT CHANGE UP (ref 0.2–1.2)
BILIRUB SERPL-MCNC: 0.5 MG/DL — SIGNIFICANT CHANGE UP (ref 0.2–1.2)
BILIRUB UR-MCNC: NEGATIVE — SIGNIFICANT CHANGE UP
BILIRUB UR-MCNC: NEGATIVE — SIGNIFICANT CHANGE UP
BLD GP AB SCN SERPL QL: NEGATIVE — SIGNIFICANT CHANGE UP
BUN SERPL-MCNC: 18 MG/DL — SIGNIFICANT CHANGE UP (ref 7–23)
BUN SERPL-MCNC: 33 MG/DL — HIGH (ref 7–23)
BURR CELLS BLD QL SMEAR: PRESENT — SIGNIFICANT CHANGE UP
BURR CELLS BLD QL SMEAR: SIGNIFICANT CHANGE UP
CA-I SERPL-SCNC: 1.28 MMOL/L — SIGNIFICANT CHANGE UP (ref 1.15–1.33)
CALCIUM SERPL-MCNC: 10.4 MG/DL — SIGNIFICANT CHANGE UP (ref 8.4–10.5)
CALCIUM SERPL-MCNC: 10.7 MG/DL — HIGH (ref 8.4–10.5)
CHLORIDE SERPL-SCNC: 103 MMOL/L — SIGNIFICANT CHANGE UP (ref 96–108)
CHLORIDE SERPL-SCNC: 99 MMOL/L — SIGNIFICANT CHANGE UP (ref 96–108)
CK MB CFR SERPL CALC: 2.8 NG/ML — SIGNIFICANT CHANGE UP (ref 0–6.7)
CK SERPL-CCNC: SIGNIFICANT CHANGE UP (ref 25–170)
CO2 BLDV-SCNC: 27.3 MMOL/L — HIGH (ref 22–26)
CO2 SERPL-SCNC: 23 MMOL/L — SIGNIFICANT CHANGE UP (ref 22–31)
CO2 SERPL-SCNC: 24 MMOL/L — SIGNIFICANT CHANGE UP (ref 22–31)
COLOR SPEC: YELLOW — SIGNIFICANT CHANGE UP
COLOR SPEC: YELLOW — SIGNIFICANT CHANGE UP
CREAT SERPL-MCNC: 0.66 MG/DL — SIGNIFICANT CHANGE UP (ref 0.5–1.3)
CREAT SERPL-MCNC: 0.86 MG/DL — SIGNIFICANT CHANGE UP (ref 0.5–1.3)
DACRYOCYTES BLD QL SMEAR: SLIGHT — SIGNIFICANT CHANGE UP
DIFF PNL FLD: NEGATIVE — SIGNIFICANT CHANGE UP
DIFF PNL FLD: NEGATIVE — SIGNIFICANT CHANGE UP
DIGOXIN SERPL-MCNC: 0.9 NG/ML — SIGNIFICANT CHANGE UP (ref 0.8–2)
EGFR: 70 ML/MIN/1.73M2 — SIGNIFICANT CHANGE UP
EGFR: 91 ML/MIN/1.73M2 — SIGNIFICANT CHANGE UP
ELLIPTOCYTES BLD QL SMEAR: SLIGHT — SIGNIFICANT CHANGE UP
EOSINOPHIL # BLD AUTO: 0.03 K/UL — SIGNIFICANT CHANGE UP (ref 0–0.5)
EOSINOPHIL # BLD AUTO: 0.35 K/UL — SIGNIFICANT CHANGE UP (ref 0–0.5)
EOSINOPHIL NFR BLD AUTO: 0.2 % — SIGNIFICANT CHANGE UP (ref 0–6)
EOSINOPHIL NFR BLD AUTO: 1.8 % — SIGNIFICANT CHANGE UP (ref 0–6)
GAS PNL BLDV: 132 MMOL/L — LOW (ref 136–145)
GAS PNL BLDV: SIGNIFICANT CHANGE UP
GAS PNL BLDV: SIGNIFICANT CHANGE UP
GLUCOSE BLDC GLUCOMTR-MCNC: 127 MG/DL — HIGH (ref 70–99)
GLUCOSE BLDC GLUCOMTR-MCNC: 135 MG/DL — HIGH (ref 70–99)
GLUCOSE BLDC GLUCOMTR-MCNC: 148 MG/DL — HIGH (ref 70–99)
GLUCOSE SERPL-MCNC: 214 MG/DL — HIGH (ref 70–99)
GLUCOSE SERPL-MCNC: 224 MG/DL — HIGH (ref 70–99)
GLUCOSE UR QL: >=1000
GLUCOSE UR QL: >=1000
GRAM STN FLD: SIGNIFICANT CHANGE UP
HCO3 BLDV-SCNC: 26 MMOL/L — SIGNIFICANT CHANGE UP (ref 22–29)
HCT VFR BLD CALC: 41.9 % — SIGNIFICANT CHANGE UP (ref 34.5–45)
HCT VFR BLD CALC: 48 % — HIGH (ref 34.5–45)
HGB BLD-MCNC: 13.5 G/DL — SIGNIFICANT CHANGE UP (ref 11.5–15.5)
HGB BLD-MCNC: 15.1 G/DL — SIGNIFICANT CHANGE UP (ref 11.5–15.5)
IMM GRANULOCYTES NFR BLD AUTO: 0.9 % — SIGNIFICANT CHANGE UP (ref 0–0.9)
INR BLD: 1.12 — SIGNIFICANT CHANGE UP (ref 0.85–1.18)
INR BLD: 1.56 — HIGH (ref 0.85–1.18)
KETONES UR-MCNC: NEGATIVE — SIGNIFICANT CHANGE UP
KETONES UR-MCNC: NEGATIVE — SIGNIFICANT CHANGE UP
LACTATE SERPL-SCNC: 2 MMOL/L — SIGNIFICANT CHANGE UP (ref 0.5–2)
LACTATE SERPL-SCNC: 2.5 MMOL/L — HIGH (ref 0.5–2)
LEUKOCYTE ESTERASE UR-ACNC: NEGATIVE — SIGNIFICANT CHANGE UP
LEUKOCYTE ESTERASE UR-ACNC: NEGATIVE — SIGNIFICANT CHANGE UP
LYMPHOCYTES # BLD AUTO: 1.65 K/UL — SIGNIFICANT CHANGE UP (ref 1–3.3)
LYMPHOCYTES # BLD AUTO: 29.2 % — SIGNIFICANT CHANGE UP (ref 13–44)
LYMPHOCYTES # BLD AUTO: 5.66 K/UL — HIGH (ref 1–3.3)
LYMPHOCYTES # BLD AUTO: 9.7 % — LOW (ref 13–44)
MAGNESIUM SERPL-MCNC: 2.5 MG/DL — SIGNIFICANT CHANGE UP (ref 1.6–2.6)
MANUAL SMEAR VERIFICATION: SIGNIFICANT CHANGE UP
MCHC RBC-ENTMCNC: 29.4 PG — SIGNIFICANT CHANGE UP (ref 27–34)
MCHC RBC-ENTMCNC: 29.5 PG — SIGNIFICANT CHANGE UP (ref 27–34)
MCHC RBC-ENTMCNC: 31.5 GM/DL — LOW (ref 32–36)
MCHC RBC-ENTMCNC: 32.2 GM/DL — SIGNIFICANT CHANGE UP (ref 32–36)
MCV RBC AUTO: 91.3 FL — SIGNIFICANT CHANGE UP (ref 80–100)
MCV RBC AUTO: 93.8 FL — SIGNIFICANT CHANGE UP (ref 80–100)
MONOCYTES # BLD AUTO: 0.99 K/UL — HIGH (ref 0–0.9)
MONOCYTES # BLD AUTO: 1.88 K/UL — HIGH (ref 0–0.9)
MONOCYTES NFR BLD AUTO: 5.8 % — SIGNIFICANT CHANGE UP (ref 2–14)
MONOCYTES NFR BLD AUTO: 9.7 % — SIGNIFICANT CHANGE UP (ref 2–14)
MRSA PCR RESULT.: NEGATIVE — SIGNIFICANT CHANGE UP
NEUTROPHILS # BLD AUTO: 10.29 K/UL — HIGH (ref 1.8–7.4)
NEUTROPHILS # BLD AUTO: 14.17 K/UL — HIGH (ref 1.8–7.4)
NEUTROPHILS NFR BLD AUTO: 53.1 % — SIGNIFICANT CHANGE UP (ref 43–77)
NEUTROPHILS NFR BLD AUTO: 83 % — HIGH (ref 43–77)
NITRITE UR-MCNC: NEGATIVE — SIGNIFICANT CHANGE UP
NITRITE UR-MCNC: NEGATIVE — SIGNIFICANT CHANGE UP
NRBC # BLD: 0 /100 WBCS — SIGNIFICANT CHANGE UP (ref 0–0)
OVALOCYTES BLD QL SMEAR: SLIGHT — SIGNIFICANT CHANGE UP
PCO2 BLDV: 41 MMHG — SIGNIFICANT CHANGE UP (ref 39–42)
PH BLDV: 7.41 — SIGNIFICANT CHANGE UP (ref 7.32–7.43)
PH UR: 6 — SIGNIFICANT CHANGE UP (ref 5–8)
PH UR: 6 — SIGNIFICANT CHANGE UP (ref 5–8)
PHOSPHATE SERPL-MCNC: 4.3 MG/DL — SIGNIFICANT CHANGE UP (ref 2.5–4.5)
PLAT MORPH BLD: ABNORMAL
PLATELET # BLD AUTO: 191 K/UL — SIGNIFICANT CHANGE UP (ref 150–400)
PLATELET # BLD AUTO: 229 K/UL — SIGNIFICANT CHANGE UP (ref 150–400)
PO2 BLDV: 48 MMHG — HIGH (ref 25–45)
POIKILOCYTOSIS BLD QL AUTO: SIGNIFICANT CHANGE UP
POLYCHROMASIA BLD QL SMEAR: SLIGHT — SIGNIFICANT CHANGE UP
POTASSIUM BLDV-SCNC: 4.5 MMOL/L — SIGNIFICANT CHANGE UP (ref 3.5–5.1)
POTASSIUM SERPL-MCNC: 4.5 MMOL/L — SIGNIFICANT CHANGE UP (ref 3.5–5.3)
POTASSIUM SERPL-MCNC: SIGNIFICANT CHANGE UP (ref 3.5–5.3)
POTASSIUM SERPL-SCNC: 4.5 MMOL/L — SIGNIFICANT CHANGE UP (ref 3.5–5.3)
POTASSIUM SERPL-SCNC: SIGNIFICANT CHANGE UP (ref 3.5–5.3)
PROCALCITONIN SERPL-MCNC: 0.06 NG/ML — SIGNIFICANT CHANGE UP (ref 0.02–0.1)
PROT SERPL-MCNC: 6.8 G/DL — SIGNIFICANT CHANGE UP (ref 6–8.3)
PROT SERPL-MCNC: 7.5 G/DL — SIGNIFICANT CHANGE UP (ref 6–8.3)
PROT UR-MCNC: NEGATIVE MG/DL — SIGNIFICANT CHANGE UP
PROT UR-MCNC: NEGATIVE MG/DL — SIGNIFICANT CHANGE UP
PROTHROM AB SERPL-ACNC: 12.7 SEC — SIGNIFICANT CHANGE UP (ref 9.5–13)
PROTHROM AB SERPL-ACNC: 17.6 SEC — HIGH (ref 9.5–13)
RAPID RVP RESULT: SIGNIFICANT CHANGE UP
RBC # BLD: 4.59 M/UL — SIGNIFICANT CHANGE UP (ref 3.8–5.2)
RBC # BLD: 5.12 M/UL — SIGNIFICANT CHANGE UP (ref 3.8–5.2)
RBC # FLD: 14.7 % — HIGH (ref 10.3–14.5)
RBC # FLD: 14.9 % — HIGH (ref 10.3–14.5)
RBC BLD AUTO: ABNORMAL
RH IG SCN BLD-IMP: POSITIVE — SIGNIFICANT CHANGE UP
S AUREUS DNA NOSE QL NAA+PROBE: NEGATIVE — SIGNIFICANT CHANGE UP
SAO2 % BLDV: 79.6 % — SIGNIFICANT CHANGE UP (ref 67–88)
SARS-COV-2 RNA SPEC QL NAA+PROBE: SIGNIFICANT CHANGE UP
SODIUM SERPL-SCNC: 136 MMOL/L — SIGNIFICANT CHANGE UP (ref 135–145)
SODIUM SERPL-SCNC: 137 MMOL/L — SIGNIFICANT CHANGE UP (ref 135–145)
SP GR SPEC: <=1.005 — SIGNIFICANT CHANGE UP (ref 1–1.03)
SP GR SPEC: <=1.005 — SIGNIFICANT CHANGE UP (ref 1–1.03)
SPECIMEN SOURCE: SIGNIFICANT CHANGE UP
SPHEROCYTES BLD QL SMEAR: SLIGHT — SIGNIFICANT CHANGE UP
TROPONIN T, HIGH SENSITIVITY RESULT: 47 NG/L — SIGNIFICANT CHANGE UP (ref 0–51)
UROBILINOGEN FLD QL: 0.2 E.U./DL — SIGNIFICANT CHANGE UP
UROBILINOGEN FLD QL: 0.2 E.U./DL — SIGNIFICANT CHANGE UP
VARIANT LYMPHS # BLD: 6.2 % — HIGH (ref 0–6)
WBC # BLD: 17.05 K/UL — HIGH (ref 3.8–10.5)
WBC # BLD: 19.37 K/UL — HIGH (ref 3.8–10.5)
WBC # FLD AUTO: 17.05 K/UL — HIGH (ref 3.8–10.5)
WBC # FLD AUTO: 19.37 K/UL — HIGH (ref 3.8–10.5)

## 2023-08-29 PROCEDURE — 99221 1ST HOSP IP/OBS SF/LOW 40: CPT

## 2023-08-29 PROCEDURE — 99223 1ST HOSP IP/OBS HIGH 75: CPT | Mod: GC

## 2023-08-29 PROCEDURE — 71045 X-RAY EXAM CHEST 1 VIEW: CPT | Mod: 26,77

## 2023-08-29 PROCEDURE — 36000 PLACE NEEDLE IN VEIN: CPT

## 2023-08-29 PROCEDURE — 71045 X-RAY EXAM CHEST 1 VIEW: CPT | Mod: 26

## 2023-08-29 PROCEDURE — 93010 ELECTROCARDIOGRAM REPORT: CPT | Mod: 76

## 2023-08-29 PROCEDURE — 99291 CRITICAL CARE FIRST HOUR: CPT | Mod: 25

## 2023-08-29 RX ORDER — DEXTROSE 50 % IN WATER 50 %
15 SYRINGE (ML) INTRAVENOUS ONCE
Refills: 0 | Status: DISCONTINUED | OUTPATIENT
Start: 2023-08-29 | End: 2023-09-01

## 2023-08-29 RX ORDER — INSULIN LISPRO 100/ML
VIAL (ML) SUBCUTANEOUS
Refills: 0 | Status: DISCONTINUED | OUTPATIENT
Start: 2023-08-29 | End: 2023-08-29

## 2023-08-29 RX ORDER — ATORVASTATIN CALCIUM 80 MG/1
1 TABLET, FILM COATED ORAL
Refills: 0 | DISCHARGE

## 2023-08-29 RX ORDER — SPIRONOLACTONE 25 MG/1
25 TABLET, FILM COATED ORAL DAILY
Refills: 0 | Status: DISCONTINUED | OUTPATIENT
Start: 2023-08-29 | End: 2023-08-30

## 2023-08-29 RX ORDER — FUROSEMIDE 40 MG
1 TABLET ORAL
Qty: 0 | Refills: 0 | DISCHARGE

## 2023-08-29 RX ORDER — PIPERACILLIN AND TAZOBACTAM 4; .5 G/20ML; G/20ML
3.38 INJECTION, POWDER, LYOPHILIZED, FOR SOLUTION INTRAVENOUS ONCE
Refills: 0 | Status: COMPLETED | OUTPATIENT
Start: 2023-08-29 | End: 2023-08-29

## 2023-08-29 RX ORDER — PIPERACILLIN AND TAZOBACTAM 4; .5 G/20ML; G/20ML
4.5 INJECTION, POWDER, LYOPHILIZED, FOR SOLUTION INTRAVENOUS ONCE
Refills: 0 | Status: COMPLETED | OUTPATIENT
Start: 2023-08-29 | End: 2023-08-29

## 2023-08-29 RX ORDER — LETROZOLE 2.5 MG/1
1 TABLET, FILM COATED ORAL
Refills: 0 | DISCHARGE

## 2023-08-29 RX ORDER — ATORVASTATIN CALCIUM 80 MG/1
1 TABLET, FILM COATED ORAL
Qty: 0 | Refills: 0 | DISCHARGE

## 2023-08-29 RX ORDER — METOPROLOL TARTRATE 50 MG
100 TABLET ORAL EVERY 24 HOURS
Refills: 0 | Status: DISCONTINUED | OUTPATIENT
Start: 2023-08-29 | End: 2023-08-29

## 2023-08-29 RX ORDER — EMPAGLIFLOZIN 10 MG/1
1 TABLET, FILM COATED ORAL
Refills: 0 | DISCHARGE

## 2023-08-29 RX ORDER — ACETYLCYSTEINE 200 MG/ML
4 VIAL (ML) MISCELLANEOUS EVERY 4 HOURS
Refills: 0 | Status: DISCONTINUED | OUTPATIENT
Start: 2023-08-29 | End: 2023-08-29

## 2023-08-29 RX ORDER — FUROSEMIDE 40 MG
40 TABLET ORAL ONCE
Refills: 0 | Status: COMPLETED | OUTPATIENT
Start: 2023-08-29 | End: 2023-08-29

## 2023-08-29 RX ORDER — CHLORHEXIDINE GLUCONATE 213 G/1000ML
1 SOLUTION TOPICAL
Refills: 0 | Status: DISCONTINUED | OUTPATIENT
Start: 2023-08-29 | End: 2023-09-01

## 2023-08-29 RX ORDER — LETROZOLE 2.5 MG/1
1 TABLET, FILM COATED ORAL
Qty: 0 | Refills: 0 | DISCHARGE

## 2023-08-29 RX ORDER — FUROSEMIDE 40 MG
20 TABLET ORAL ONCE
Refills: 0 | Status: DISCONTINUED | OUTPATIENT
Start: 2023-08-29 | End: 2023-08-29

## 2023-08-29 RX ORDER — SODIUM CHLORIDE 9 MG/ML
1000 INJECTION, SOLUTION INTRAVENOUS
Refills: 0 | Status: DISCONTINUED | OUTPATIENT
Start: 2023-08-29 | End: 2023-09-01

## 2023-08-29 RX ORDER — LETROZOLE 2.5 MG/1
2.5 TABLET, FILM COATED ORAL DAILY
Refills: 0 | Status: DISCONTINUED | OUTPATIENT
Start: 2023-08-29 | End: 2023-09-01

## 2023-08-29 RX ORDER — SODIUM CHLORIDE 9 MG/ML
4 INJECTION INTRAMUSCULAR; INTRAVENOUS; SUBCUTANEOUS EVERY 8 HOURS
Refills: 0 | Status: DISCONTINUED | OUTPATIENT
Start: 2023-08-29 | End: 2023-09-01

## 2023-08-29 RX ORDER — TRAZODONE HCL 50 MG
1 TABLET ORAL
Refills: 0 | DISCHARGE

## 2023-08-29 RX ORDER — ESCITALOPRAM OXALATE 10 MG/1
20 TABLET, FILM COATED ORAL DAILY
Refills: 0 | Status: DISCONTINUED | OUTPATIENT
Start: 2023-08-29 | End: 2023-09-01

## 2023-08-29 RX ORDER — TIOTROPIUM BROMIDE 18 UG/1
2 CAPSULE ORAL; RESPIRATORY (INHALATION) DAILY
Refills: 0 | Status: DISCONTINUED | OUTPATIENT
Start: 2023-08-29 | End: 2023-08-29

## 2023-08-29 RX ORDER — DEXTROSE 50 % IN WATER 50 %
25 SYRINGE (ML) INTRAVENOUS ONCE
Refills: 0 | Status: DISCONTINUED | OUTPATIENT
Start: 2023-08-29 | End: 2023-09-01

## 2023-08-29 RX ORDER — TRAZODONE HCL 50 MG
150 TABLET ORAL AT BEDTIME
Refills: 0 | Status: DISCONTINUED | OUTPATIENT
Start: 2023-08-29 | End: 2023-09-01

## 2023-08-29 RX ORDER — ACETAMINOPHEN 500 MG
650 TABLET ORAL ONCE
Refills: 0 | Status: COMPLETED | OUTPATIENT
Start: 2023-08-29 | End: 2023-08-29

## 2023-08-29 RX ORDER — METOPROLOL TARTRATE 50 MG
100 TABLET ORAL DAILY
Refills: 0 | Status: DISCONTINUED | OUTPATIENT
Start: 2023-08-29 | End: 2023-08-29

## 2023-08-29 RX ORDER — METFORMIN HYDROCHLORIDE 850 MG/1
1 TABLET ORAL
Qty: 0 | Refills: 0 | DISCHARGE

## 2023-08-29 RX ORDER — DEXTROSE 50 % IN WATER 50 %
12.5 SYRINGE (ML) INTRAVENOUS ONCE
Refills: 0 | Status: DISCONTINUED | OUTPATIENT
Start: 2023-08-29 | End: 2023-09-01

## 2023-08-29 RX ORDER — SACUBITRIL AND VALSARTAN 24; 26 MG/1; MG/1
1 TABLET, FILM COATED ORAL EVERY 12 HOURS
Refills: 0 | Status: DISCONTINUED | OUTPATIENT
Start: 2023-08-29 | End: 2023-08-30

## 2023-08-29 RX ORDER — ESCITALOPRAM OXALATE 10 MG/1
2 TABLET, FILM COATED ORAL
Refills: 0 | DISCHARGE

## 2023-08-29 RX ORDER — APIXABAN 2.5 MG/1
1 TABLET, FILM COATED ORAL
Qty: 0 | Refills: 0 | DISCHARGE

## 2023-08-29 RX ORDER — INSULIN LISPRO 100/ML
VIAL (ML) SUBCUTANEOUS AT BEDTIME
Refills: 0 | Status: DISCONTINUED | OUTPATIENT
Start: 2023-08-29 | End: 2023-09-01

## 2023-08-29 RX ORDER — SODIUM CHLORIDE 9 MG/ML
1600 INJECTION, SOLUTION INTRAVENOUS ONCE
Refills: 0 | Status: COMPLETED | OUTPATIENT
Start: 2023-08-29 | End: 2023-08-29

## 2023-08-29 RX ORDER — METOPROLOL TARTRATE 50 MG
100 TABLET ORAL EVERY 24 HOURS
Refills: 0 | Status: DISCONTINUED | OUTPATIENT
Start: 2023-08-29 | End: 2023-09-01

## 2023-08-29 RX ORDER — EMPAGLIFLOZIN 10 MG/1
1 TABLET, FILM COATED ORAL
Qty: 0 | Refills: 0 | DISCHARGE

## 2023-08-29 RX ORDER — SODIUM CHLORIDE 9 MG/ML
4 INJECTION INTRAMUSCULAR; INTRAVENOUS; SUBCUTANEOUS EVERY 12 HOURS
Refills: 0 | Status: DISCONTINUED | OUTPATIENT
Start: 2023-08-29 | End: 2023-08-29

## 2023-08-29 RX ORDER — APIXABAN 2.5 MG/1
5 TABLET, FILM COATED ORAL EVERY 12 HOURS
Refills: 0 | Status: DISCONTINUED | OUTPATIENT
Start: 2023-08-29 | End: 2023-09-01

## 2023-08-29 RX ORDER — VANCOMYCIN HCL 1 G
1250 VIAL (EA) INTRAVENOUS ONCE
Refills: 0 | Status: COMPLETED | OUTPATIENT
Start: 2023-08-29 | End: 2023-08-29

## 2023-08-29 RX ORDER — ACETAMINOPHEN 500 MG
650 TABLET ORAL EVERY 6 HOURS
Refills: 0 | Status: DISCONTINUED | OUTPATIENT
Start: 2023-08-29 | End: 2023-08-29

## 2023-08-29 RX ORDER — TIOTROPIUM BROMIDE 18 UG/1
2 CAPSULE ORAL; RESPIRATORY (INHALATION)
Refills: 0 | DISCHARGE

## 2023-08-29 RX ORDER — ESCITALOPRAM OXALATE 10 MG/1
1 TABLET, FILM COATED ORAL
Qty: 0 | Refills: 0 | DISCHARGE

## 2023-08-29 RX ORDER — METFORMIN HYDROCHLORIDE 850 MG/1
1 TABLET ORAL
Refills: 0 | DISCHARGE

## 2023-08-29 RX ORDER — DIGOXIN 250 MCG
125 TABLET ORAL DAILY
Refills: 0 | Status: DISCONTINUED | OUTPATIENT
Start: 2023-08-29 | End: 2023-09-01

## 2023-08-29 RX ORDER — IPRATROPIUM/ALBUTEROL SULFATE 18-103MCG
3 AEROSOL WITH ADAPTER (GRAM) INHALATION EVERY 6 HOURS
Refills: 0 | Status: DISCONTINUED | OUTPATIENT
Start: 2023-08-29 | End: 2023-09-01

## 2023-08-29 RX ORDER — PIPERACILLIN AND TAZOBACTAM 4; .5 G/20ML; G/20ML
3.38 INJECTION, POWDER, LYOPHILIZED, FOR SOLUTION INTRAVENOUS EVERY 8 HOURS
Refills: 0 | Status: DISCONTINUED | OUTPATIENT
Start: 2023-08-29 | End: 2023-08-29

## 2023-08-29 RX ORDER — GLUCAGON INJECTION, SOLUTION 0.5 MG/.1ML
1 INJECTION, SOLUTION SUBCUTANEOUS ONCE
Refills: 0 | Status: DISCONTINUED | OUTPATIENT
Start: 2023-08-29 | End: 2023-09-01

## 2023-08-29 RX ORDER — ATORVASTATIN CALCIUM 80 MG/1
40 TABLET, FILM COATED ORAL AT BEDTIME
Refills: 0 | Status: DISCONTINUED | OUTPATIENT
Start: 2023-08-29 | End: 2023-09-01

## 2023-08-29 RX ORDER — ACETAMINOPHEN 500 MG
1000 TABLET ORAL ONCE
Refills: 0 | Status: COMPLETED | OUTPATIENT
Start: 2023-08-29 | End: 2023-08-29

## 2023-08-29 RX ORDER — DIGOXIN 250 MCG
1 TABLET ORAL
Qty: 0 | Refills: 0 | DISCHARGE

## 2023-08-29 RX ORDER — TRAZODONE HCL 50 MG
1 TABLET ORAL
Qty: 0 | Refills: 0 | DISCHARGE

## 2023-08-29 RX ORDER — PIPERACILLIN AND TAZOBACTAM 4; .5 G/20ML; G/20ML
4.5 INJECTION, POWDER, LYOPHILIZED, FOR SOLUTION INTRAVENOUS EVERY 8 HOURS
Refills: 0 | Status: DISCONTINUED | OUTPATIENT
Start: 2023-08-30 | End: 2023-09-01

## 2023-08-29 RX ADMIN — Medication 400 MILLIGRAM(S): at 18:25

## 2023-08-29 RX ADMIN — Medication 100 MILLIGRAM(S): at 19:32

## 2023-08-29 RX ADMIN — PIPERACILLIN AND TAZOBACTAM 25 GRAM(S): 4; .5 INJECTION, POWDER, LYOPHILIZED, FOR SOLUTION INTRAVENOUS at 22:23

## 2023-08-29 RX ADMIN — Medication 40 MILLIGRAM(S): at 23:40

## 2023-08-29 RX ADMIN — Medication 3 MILLILITER(S): at 21:26

## 2023-08-29 RX ADMIN — Medication 166.67 MILLIGRAM(S): at 01:47

## 2023-08-29 RX ADMIN — Medication 40 MILLIGRAM(S): at 17:00

## 2023-08-29 RX ADMIN — Medication 125 MICROGRAM(S): at 07:53

## 2023-08-29 RX ADMIN — PIPERACILLIN AND TAZOBACTAM 200 GRAM(S): 4; .5 INJECTION, POWDER, LYOPHILIZED, FOR SOLUTION INTRAVENOUS at 00:48

## 2023-08-29 RX ADMIN — Medication 3 MILLILITER(S): at 18:51

## 2023-08-29 RX ADMIN — LETROZOLE 2.5 MILLIGRAM(S): 2.5 TABLET, FILM COATED ORAL at 13:39

## 2023-08-29 RX ADMIN — SODIUM CHLORIDE 1600 MILLILITER(S): 9 INJECTION, SOLUTION INTRAVENOUS at 00:48

## 2023-08-29 RX ADMIN — SPIRONOLACTONE 25 MILLIGRAM(S): 25 TABLET, FILM COATED ORAL at 07:54

## 2023-08-29 RX ADMIN — PIPERACILLIN AND TAZOBACTAM 25 GRAM(S): 4; .5 INJECTION, POWDER, LYOPHILIZED, FOR SOLUTION INTRAVENOUS at 07:29

## 2023-08-29 RX ADMIN — PIPERACILLIN AND TAZOBACTAM 200 GRAM(S): 4; .5 INJECTION, POWDER, LYOPHILIZED, FOR SOLUTION INTRAVENOUS at 19:03

## 2023-08-29 RX ADMIN — Medication 650 MILLIGRAM(S): at 00:48

## 2023-08-29 RX ADMIN — Medication 1000 MILLIGRAM(S): at 19:25

## 2023-08-29 RX ADMIN — ESCITALOPRAM OXALATE 20 MILLIGRAM(S): 10 TABLET, FILM COATED ORAL at 13:39

## 2023-08-29 RX ADMIN — ATORVASTATIN CALCIUM 40 MILLIGRAM(S): 80 TABLET, FILM COATED ORAL at 21:55

## 2023-08-29 RX ADMIN — APIXABAN 5 MILLIGRAM(S): 2.5 TABLET, FILM COATED ORAL at 19:32

## 2023-08-29 RX ADMIN — Medication 4 MILLILITER(S): at 13:40

## 2023-08-29 RX ADMIN — SODIUM CHLORIDE 4 MILLILITER(S): 9 INJECTION INTRAMUSCULAR; INTRAVENOUS; SUBCUTANEOUS at 19:03

## 2023-08-29 RX ADMIN — Medication 150 MILLIGRAM(S): at 21:55

## 2023-08-29 RX ADMIN — Medication 4 MILLILITER(S): at 09:59

## 2023-08-29 NOTE — ED ADULT NURSE NOTE - NSFALLHARMRISKINTERV_ED_ALL_ED

## 2023-08-29 NOTE — SWALLOW BEDSIDE ASSESSMENT ADULT - SWALLOW EVAL: DIAGNOSIS
Intermittent cough during PO trials, however suspect related to baseline cough vs aspiration. Of note, patient completed OP MBSS 8/2/2023 which revealed no aspiration or notable pharyngeal deficits. Given recent MBSS results and clear chest imaging, recommend continue PO diet with aspiration precautions given increased risk for aspiration and its negative sequelae. Should concern for aspiration continue, please reconsult this svs for reassessment.

## 2023-08-29 NOTE — CONSULT NOTE ADULT - ASSESSMENT
78 yo F w/ afib on eliquis, HTN, DM, PPM, R sided breast ca in the past, bronchiectasis (followed by Dr Hart), CRISTIN on CPAP at night, CHF, presenting with hypoxia. Pt was in usual state of health yesterday, per family and pt  today at 8pm was eating and began coughing and was febrile to 101 and dyspneic with hypoxia to 84% on RA which improved w/ NC, they did home labs w/ WBC 20 and lactate 2.2 so brought her to ED. Pt was recently admitted at [facility] 2 months ago for sepsis 2/2 aspiration pneumonia where she was admitted to the ICU requiring pressors. Admitted for AHRF and severe sepsis likely 2/2 to bronchiectasis exacerbation vs aspiration pneumonitis. RRT called today for acute worsening of respiratory status in a setting of fluid overload. Cardiology consulted for volume optimization.     Review of studies:  EKG: Sinus tachycardia, 1st degree AV block, atypical LBBB  TTE (6/2022): EF 20-25% with global hypokinesis, dilated LA, moderate MR, PASP 36    #AHRF 2/2 acute on chronic CHF exacerbation in a setting of aspiration pneumonia   Patient is fluid overloaded on physical exam, warm, well perfused. Requiring BIPAP  Volume: recommend Lasix 40 mg IV tid, reaccessed with UOP and reps status  GDMT: continue home medications: spironolactone 25mg, Toprol XL 100mg, entresto 24/26 and 49/51  Etiology: patient follows with Dr. Dickinson at [facility], please obtain collateral; unclear if had any ischemic work up; recommend repeat full echo; please repeat EKG once HR improves.  Devices: s/p dual chamber PPM placed 12 years ago    #chronic afib  c/w AC, BB as above and digoxin  76 yo F w/ afib on eliquis, HTN, DM, PPM, R sided breast ca in the past, bronchiectasis (followed by Dr Hart), CRISTIN on CPAP at night, CHF, presenting with hypoxia. Pt was in usual state of health yesterday, per family and pt  today at 8pm was eating and began coughing and was febrile to 101 and dyspneic with hypoxia to 84% on RA which improved w/ NC, they did home labs w/ WBC 20 and lactate 2.2 so brought her to ED. Pt was recently admitted at [facility] 2 months ago for sepsis 2/2 aspiration pneumonia where she was admitted to the ICU requiring pressors. Admitted for AHRF and severe sepsis likely 2/2 to bronchiectasis exacerbation vs aspiration pneumonitis. RRT called today for acute worsening of respiratory status in a setting of fluid overload. Cardiology consulted for volume optimization.     Review of studies:  EKG: Sinus tachycardia, 1st degree AV block, atypical LBBB  TTE (6/2022): EF 20-25% with global hypokinesis, dilated LA, moderate MR, PASP 36    #AHRF 2/2 FPE/ acute on chronic CHF exacerbation in a setting of aspiration pneumonia   Patient is fluid overloaded on physical exam, warm, well perfused. Requiring BIPAP  Volume: recommend Lasix 40 mg IV tid, reaccessed with UOP and reps status  GDMT: continue home medications: spironolactone 25mg, Toprol XL 100mg, entresto 24/26 and 49/51  Etiology: patient follows with Dr. Dickinson at [facility], please obtain collateral; unclear if had any ischemic work up; recommend repeat full echo; please repeat EKG once HR improves.  Devices: s/p dual chamber PPM placed 12 years ago    #chronic afib  c/w AC, BB as above and digoxin

## 2023-08-29 NOTE — H&P ADULT - ASSESSMENT
76 yo F w/ afib on eliquis, HTN, DM, PPM, R sided breast ca in the past, bronchiectasis (followed by Dr Hart), CRISTIN on CPAP at night, CHF, presenting with hypoxia at home following aspiration event, found to have AHRF and severe sepsis likely 2/2 to bronchiectasis exacerbation vs aspiration pneumonitis

## 2023-08-29 NOTE — H&P ADULT - HISTORY OF PRESENT ILLNESS
In the ED:  Initial vital signs: T: XX F, HR: XX, BP: XX, R: XX, SpO2: XX% on RA  Labs: significant for  Imaging:  CXR:   EKG:   Medications:   Consults: none  In the ED:  Initial vital signs: T: 100.1F, HR: 131, BP: 90/52, R: 26, SpO2: 85% on RA  Labs: significant for WBC 17.05 (83% neutrophil predom), PT/INR 17.6/1.56, Lactate 2.5 >> 2, BUN 33, Glu 214, U/A glu >1000  Imaging:  CXR:   EKG:   Medications:   Consults: none  78 yo F w/ afib on eliquis, HTN, DM, PPM, R sided breast ca in the past, bronchiectasis (followed by Dr Hart), CRISTIN on CPAP at night, CHF, presenting with hypoxia. Pt was in usual state of health yesterday, per family and pt  today at 8pm was eating and began coughing and was febrile to 101 and dyspneic with hypoxia to 84% on RA which improved w/ NC, they did home labs w/ WBC 20 and lactate 2.2 so brought her to ED. Pt was recently admitted at [facility] 2 months ago for sepsis 2/2 aspiration pneumonia where she was admitted to the ICU requiring pressors.  Pt was seen in Dr. Hart's office 8/28 for follow up for bronchiectasis, complaining of persistent cough for 12 days, she was prescribed a 7 day course of cefdinir and acetylcysteine inhalation  	  In the ED:  Initial vital signs: T: 100.1F, HR: 131, BP: 90/52, R: 26, SpO2: 85% on RA  Labs: significant for WBC 17.05 (83% neutrophil predom), PT/INR 17.6/1.56, Lactate 2.5 >> 2, BUN 33, Glu 214, U/A glu >1000  Imaging:  CXR:   EKG:   Medications:   Consults: none  78 yo F w/ afib on eliquis, HTN, DM, PPM, R sided breast ca in the past, bronchiectasis (followed by Dr Hart), CRISTIN on CPAP at night, CHF, presenting with hypoxia. Pt was in usual state of health yesterday, per family and pt  today at 8pm was eating and began coughing and was febrile to 101 and dyspneic with hypoxia to 84% on RA which improved w/ NC, they did home labs w/ WBC 20 and lactate 2.2 so brought her to ED. Pt was recently admitted at [facility] 2 months ago for sepsis 2/2 aspiration pneumonia where she was admitted to the ICU requiring pressors.  Pt was seen in Dr. Hart's office 8/28 for follow up for bronchiectasis, complaining of persistent cough for 12 days, she was prescribed a 7 day course of cefdinir and acetylcysteine inhalation  	  In the ED:  Initial vital signs: T: 100.1F, HR: 131, BP: 90/52, R: 26, SpO2: 85% on RA  Labs: significant for WBC 17.05 (83% neutrophil predom), PT/INR 17.6/1.56, Lactate 2.5 >> 2, BUN 33, Glu 214, U/A glu >1000  Imaging:  CXR: no acute cardiopulmonary findings  EKG: afib, , QTc 464  Medications: zosyn 3.375g IV, tylenol 650mg PO, vanc 1.25g IV, LR 1.6L  Consults: none

## 2023-08-29 NOTE — H&P ADULT - PROBLEM SELECTOR PLAN 4
Home meds: digoxin .152mcg qd, toprol XL 100mg qd and eliquis 5mg BID    Plan:  - F/U dig level  - Cont digoxin   - Cont eliquis 5mg BID

## 2023-08-29 NOTE — H&P ADULT - TIME BILLING
Patient seen and examined with house-staff during bedside rounds.  Resident note read, including vitals, physical findings, laboratory data, and radiological reports.   Revisions included below.  Direct personal management at bed side and extensive interpretation of the data.  Plan was outlined and discussed in details with the housestaff.  Decision making of high complexity  Action taken for acute disease activity to reflect the level of care provided:  - medication reconciliation  - review laboratory data  The patient was seen multiple times during the day.  Initially was seen in the morning and the patient was consistent with pulmonary infection although the chest x-ray did not reveal any infiltrate.  The patient was started on Vanco and Zosyn and cultures were negative today.  Over the course of the day patient developed sudden onset of acute respiratory distress and the picture was consistent with acute pulmonary edema.  The patient was given diuretics and later Ferrer was inserted.  She had adequate response with -3 L and improvement in pulmonary status.  Noninvasive ventilation was applied to the patient.  Cardiology case were consulted and I discussed the case with them.  Official echocardiogram.  Continue diuresis.  Continue cardiac meds

## 2023-08-29 NOTE — PROCEDURE NOTE - NSICDXPROCEDURE_GEN_ALL_CORE_FT
PROCEDURES:  Insertion, catheter, intravenous 29-Aug-2023 18:21:13  Judit Whitney  Blood draw 29-Aug-2023 18:21:20  Judit Whitney

## 2023-08-29 NOTE — H&P ADULT - PROBLEM SELECTOR PLAN 1
Pt presenting to the ED with hypoxia, tachycardia, tachynea and hypoxia with leukocytosis to 20 and lactate to 2.5 now cleared, following aspiration event evening of 8/28. Pt seen o/p, 8/28 to f/u for recent persistent cough. Source bronchiectasias exacerbation vs aspiration pneumonitis, CXR w/o consolidation or effusions, however remains hypoxic.      Plan:  - Cont Zosyn 3.375g IV q8hrs for 7 days  - F/U BCx and sputum Cx

## 2023-08-29 NOTE — ED ADULT NURSE NOTE - OBJECTIVE STATEMENT
Pt reports tonight sudden onset of chills, fatigue, weakness and SOB. Per pt family, pt was hypotensive, tacycardic and O2 sat was 84% on RA. Pt has hx of aspiration pneumonia and covid. Pt Pt reports tonight sudden onset of chills, fatigue, weakness and SOB. Per pt family, pt was hypotensive, tachycardic and O2 sat was 84% on RA. Pt has hx of aspiration pneumonia and covid. Pt reports productive cough, SOB and chills. Pt febrile on arrival. Pt able to speak in short sentences, reports feeling better with O2. Pt denies any CP, no dizziness, no swelling noted to LEs. Pt AOx3, ambulates short distance with walker at baseline. Pt has hx of afib on blood thinners.

## 2023-08-29 NOTE — H&P ADULT - PROBLEM SELECTOR PLAN 11
F: s/p LR 1.6L  E: replete: K<4, Mg<2  N: DASH -  Kosher  D: Eliquis 5mg BID    Dispo: [facility]  Code:Full

## 2023-08-29 NOTE — PROVIDER CONTACT NOTE (CHANGE IN STATUS NOTIFICATION) - ASSESSMENT
Pt lungs rhonchi, cough up thick copius sputum. diaphoretic, difficulty breathing as per patient and family at bedside. VS: 168/121: BP, Pulse: 165, O2: 92 4LNC, RR: 22, Temp: 98.7 oral.

## 2023-08-29 NOTE — ED PROVIDER NOTE - CLINICAL SUMMARY MEDICAL DECISION MAKING FREE TEXT BOX
Pt here w/ sepsis- fever, tachycardic, hypoxic  plan for infectious workup- labs/cultures/ua/cxr/abx/ivf/tylenol, admit Pt here w/ sepsis and aspiration tonight- fever, tachycardic, hypoxic, leukocytosis and elevated lactate  SpO2 93-94% on 3L NC  suspect aspiration/possible pneumonitis, plan for infectious workup- rvp/labs/cultures/ua/cxr/broad spectrum abx/ivf/tylenol, admit Pt here w/ sepsis and aspiration tonight- fever, tachycardic, hypoxic, leukocytosis and elevated lactate  SpO2 93-94% on 3L NC  suspect aspiration/possible pneumonitis, plan for infectious workup- rvp/labs/cultures/ua/cxr/broad spectrum abx/ivf/tylenol, admit  -->VS improved s/p fluids and antipyretics, HR down to 80s, pt awake alert talking not in respiratory distress, satting well on NC, stable for floor

## 2023-08-29 NOTE — ED PROVIDER NOTE - CARE PLAN
1 Principal Discharge DX:	Sepsis  Secondary Diagnosis:	Cough  Secondary Diagnosis:	Hypoxia   Principal Discharge DX:	Sepsis  Secondary Diagnosis:	Cough  Secondary Diagnosis:	Hypoxia  Secondary Diagnosis:	History of airway aspiration

## 2023-08-29 NOTE — CONSULT NOTE ADULT - ASSESSMENT
I M    77 y o F w/ afib on eliquis, HTN, DM, PPM, R sided breast ca in the past, bronchiectasis (followed by Dr Hart), CRISTIN on CPAP at night, CHF, presenting with hypoxia at home following aspiration event, found to have AHRF and severe sepsis likely 2/2 to bronchiectasis exacerbation vs aspiration pneumonitis    Problem/Plan - 1:  ·  Problem: Severe sepsis.   ·  Plan: Pt presenting to the ED with hypoxia, tachycardia, tachynea and hypoxia with leukocytosis to 20 and lactate to 2.5 now cleared, following aspiration event evening of 8/28. Pt seen o/p, 8/28 to f/u for recent persistent cough. Source bronchiectasias exacerbation vs aspiration pneumonitis, CXR w/o consolidation or effusions, however remains hypoxic.      Plan:  - Cont Zosyn 3.375g IV q8hrs for 7 days  - F/U BCx and sputum Cx.    Problem/Plan - 2:  ·  Problem: Acute respiratory failure with hypoxia.   ·  Plan: Pt reportedly hypoxic to 84% prior to presentation, on intermittent supplemental O2 at home. Witnessed aspiration event likely precipitating hypoxia    Plan:  - Wean O2 as tolerate  - Maintain aspiration precautions  - Cont acetylcysteine inhalation  - cont spiriva inhaler.    Problem/Plan - 3:  ·  Problem: Aspiration pneumonitis.   ·  Plan: Pt presenting following choking event, noted to be hypoxic at home. Recent hospitilization at [facility] for aspiration PNA, requiring pressors in ICU. Pt has hx of tracheobronchomalacia, barium swallow 8/2 w/ findings of calcifications on laryngeal cartilage. Treating with Zosyn iso severe sepsis     Plan:  - NPO pending S &S assessment  - F/U Speech and swallow  - Aspiration precautions.    Problem/Plan - 4:  ·  Problem: Chronic atrial fibrillation.   ·  Plan: Home meds: digoxin .152mcg qd, toprol XL 100mg qd and eliquis 5mg BID    Plan:  - F/U dig level  - Cont digoxin   - Cont eliquis 5mg BID.    Problem/Plan - 5:  ·  Problem: Chronic heart failure.   ·  Plan: Home meds: spironolactone 25mg, Toprol XL 100mg, entresto 24/26 and 49/51    Plan:  - Continue home meds.    Problem/Plan - 6:  ·  Problem: Diabetes mellitus.   ·  Plan: Home meds: Jardiance 10mg qd    Plan:  - ISS  - Consistent Carb diet.    Problem/Plan - 7:  ·  Problem: HLD (hyperlipidemia).   ·  Plan: Home meds: atorvastatin 40mg qhs    Plan:  - Cont home meds.    Problem/Plan - 8:  ·  Problem: CRISTIN on CPAP.   ·  Plan: - Cont CRISTIN o/n.    Problem/Plan - 9:  ·  Problem: History of breast cancer.   ·  Plan: Home meds: letrozole 2.5 mg qd    Plan:  - Cont home meds.    Problem/Plan - 10:  ·  Problem: Anxiety and depression.   ·  Plan; Home meds: trazadone 150mg qhs and escitalopram 20mg qd    Plan:  - cont home meds.    Problem/Plan - 11:  ·  Problem: Prophylactic measure.   ·  Plan: F: s/p LR 1.6L  E: replete: K<4, Mg<2  N: DASH -  Kosher  D: Eliquis 5mg BID    Dispo: [facility]  Code:Full.

## 2023-08-29 NOTE — PROGRESS NOTE ADULT - ASSESSMENT
Patient is 77yFemale with PMHx of *** who presents with ***.     NEURO:  #Anxiety and depression.   Home meds: trazadone 150mg qhs and escitalopram 20mg qd  Plan:  - cont home meds.    CV:  #Chronic atrial fibrillation  Plan: Home meds: digoxin .152mcg qd, toprol XL 100mg qd and eliquis 5mg BID  - dig level WNL  - Cont digoxin   - Cont eliquis 5mg BID    #Chronic heart failure.   Plan: Home meds: spironolactone 25mg, Toprol XL 100mg, entresto 24/26 and 49/51  - Continue home meds    #HLD  Plan: Home meds: atorvastatin 40mg qhs  - Cont home meds.    #CRISTIN on CPAP.   Plan:   - Cont CRISTIN o/n.      PULM:  #Aspiration pneumonitis.   Pt presenting following choking event, noted to be hypoxic at home. Recent hospitilization at [facility] for aspiration PNA, requiring pressors in ICU. Pt has hx of tracheobronchomalacia, barium swallow 8/2 w/ findings of calcifications on laryngeal cartilage. Treating with Zosyn iso severe sepsis   - NPO pending S &S assessment  - F/U Speech and swallow  - Aspiration precautions.    #Acute respiratory failure with hypoxia.   Plan: Pt reportedly hypoxic to 84% prior to presentation, on intermittent supplemental O2 at home. Witnessed aspiration event likely precipitating hypoxia    Plan:  - currently on NRB, plan to put on HFNC  - Maintain aspiration precautions  - Cont acetylcysteine inhalation  - cont spiriva inhaler.    RENAL:  KELLEY    GI:  KELLEY    ENDO:  #DM2  Home meds: Jardiance 10mg qd  - ISS  - Consistent Carb diet.      METABOLIC:  KELLEY    HEMATOLOGIC:  #History of breast cancer.   Plan: Home meds: letrozole 2.5 mg qd  - Cont home meds.    ID:  #Severe sepsis.   Pt presenting to the ED with hypoxia, tachycardia, tachynea and hypoxia with leukocytosis to 20 and lactate to 2.5 now cleared, following aspiration event evening of 8/28. Pt seen o/p, 8/28 to f/u for recent persistent cough. Source bronchiectasias exacerbation vs aspiration pneumonitis, CXR w/o consolidation or effusions, however remains hypoxic.    - Cont Zosyn 3.375g IV q8hrs for 7 days  - F/U BCx and sputum Cx.    SKIN:  KELLEY    #Prophylactic measure.   F: s/p LR 1.6L  E: replete: K<4, Mg<2  N: DASH -  Kosher  D: Eliquis 5mg BID    Dispo: F  Code:Full.     78 yo F w/ afib on eliquis, HTN, DM, PPM, R sided breast ca in the past, bronchiectasis (followed by Dr Hart), CRISTIN on CPAP at night, CHF, presenting with hypoxia at home following aspiration event, found to have AHRF and severe sepsis likely 2/2 to aspiration pneumonitis.    NEURO:  #Anxiety and depression.   Home meds: trazadone 150mg qhs and escitalopram 20mg qd  Plan:  - cont home meds.    CV:  #Chronic atrial fibrillation  Plan: Home meds: digoxin .152mcg qd, toprol XL 100mg qd and eliquis 5mg BID  - dig level WNL  - Cont digoxin   - Cont eliquis 5mg BID    #Chronic heart failure.   Plan: Home meds: spironolactone 25mg, Toprol XL 100mg, entresto 24/26 and 49/51  - Continue home meds    #HLD  Plan: Home meds: atorvastatin 40mg qhs  - Cont home meds.    #CRISTIN on CPAP.   Plan:   - Cont CRISTIN o/n.    PULM:  #Aspiration pneumonitis.   Pt presenting following choking event, noted to be hypoxic at home. Recent hospitilization at [facility] for aspiration PNA, requiring pressors in ICU. Pt has hx of tracheobronchomalacia, barium swallow 8/2 w/ findings of calcifications on laryngeal cartilage. Treating with Zosyn iso severe sepsis   - mildly thick liquids per S&S  - Aspiration precautions.  - See sepsis plan in ID below    #Acute respiratory failure with hypoxia.   Plan: Pt reportedly hypoxic to 84% prior to presentation, on intermittent supplemental O2 at home. Witnessed aspiration event likely precipitating hypoxia  - currently w HFNC  - Maintain aspiration precautions  - cont duoneb q6  - cont hypersaline inhalatoin    RENAL:  KELLEY    GI:  KELLEY    ENDO:  #DM2  Home meds: Jardiance 10mg qd  - ISS  - Consistent Carb diet.    METABOLIC:  KELLEY    HEMATOLOGIC:  #History of breast cancer.   Plan: Home meds: letrozole 2.5 mg qd  - Cont home meds.    ID:  #Severe sepsis.   Pt presenting to the ED with hypoxia, tachycardia, tachynea and hypoxia with leukocytosis to 20 and lactate to 2.5 now cleared, following aspiration event evening of 8/28. Pt seen o/p, 8/28 to f/u for recent persistent cough. Source bronchiectasias exacerbation vs aspiration pneumonitis, CXR w/o consolidation or effusions, however remains hypoxic.    - Cont Zosyn 4.5g IV q8hrs for 7 days  - F/U BCx and sputum Cx.    SKIN:  KELLEY    #Prophylactic measure.   F: s/p LR 1.6L  E: replete: K<4, Mg<2  N: [id number]  Priscilla  D: Eliquis 5mg BID    Dispo: [facility]  Code:Full.

## 2023-08-29 NOTE — CONSULT NOTE ADULT - ATTENDING COMMENTS
Patient is a 76 yo F w/PMhx HFrEF. Afib on Eliquis s/p PPM vs AICD, bronchiectasis, recent exacerbation, p/w AHRF likely 2/2 aspiration pneumonitis c/b flash pulmonary edema in the setting of acute HTN episode, transferred to MICU for impending respiratory failure, Now clinically improved after diuresis and Positive pressure support    Review of studies:  - EKG 08/29/23: Sinus tachycardia, 1st degree AV block, atypical LBBB  - TTE (08/2023):  Moderately-to-severely reduced left ventricular systolic function (EF 30-35%). Mildly dilated right ventricular size. Mildly reduced right ventricular systolic function. Severely dilated left atrium. Mild-to-moderate aortic regurgitation. Mild-to-moderate mitral regurgitation. Compared to the previous TTE performed on 7/19/2022, pulmonary artery systolic pressure is lower.  - TTE (6/2022): EF 20-25% with global hypokinesis, dilated LA, moderate MR, PASP 36    -#Hypoxic Respiratory Failure with Flash pulmonary Edema in patient with HFrEF and Valvular Heart Disease   - Patient with flash pulmonary edema on 8/29 in setting of HTN episode, requiring BIPAP and aggressive diuresis  - She was started on Lasix 40 IV TID with excellent UO and clinical improvement  - Cont to Hold spironolactone 25mg, entresto 24/26.   - Etiology of patient's cardiomyopathy is not clear. She follows with Dr. Dickinson at [facility]. Recommend obtaining collaterals about prior ishcemic workup.    #chronic afib  - Continue with Eliqusi 5 mg PO BID and Digoxin.  - Would Hold BB while being aggressively diuresed.

## 2023-08-29 NOTE — SWALLOW BEDSIDE ASSESSMENT ADULT - PHARYNGEAL PHASE
Laryngeal movement palpated. Appeared timely per palpation. Coughing noted, however intermittent and inconsistent with timing of swallow. Cannot rule out of aspiration vs baseline cough.

## 2023-08-29 NOTE — SWALLOW BEDSIDE ASSESSMENT ADULT - COMMENTS
Pt seen by tis [facility] 8/2/2023 for an OP MBSS, which revealead a functional Pt seen by this c 8/2/2023 for an OP MBSS, which revealed a functional oropharyngeal swallow with relatively preserved safety and efficiency. NO aspiration during exam.

## 2023-08-29 NOTE — ED PROVIDER NOTE - PHYSICAL EXAMINATION
CONST: nontoxic NAD chrnically ill appearing sitting upright awake and alert  HEAD: atraumatic  EYES: conjunctivae clear  NECK: supple  CARD: irregular tachycardia  CHEST: breathing comfortably, no stridor/retractions/tripoding  EXT: FROM nonpitting edema b/l  SKIN: warm, dry  NEURO: awake alert answering questions following commands moving all extremities

## 2023-08-29 NOTE — CHART NOTE - NSCHARTNOTEFT_GEN_A_CORE
***Rapid Response Clinical Impact Nurse Practitioner Note***    INCOMPLETE TEMPLATE    Patient is a 77y old  Female admitted for HPI:  78 yo F w/ afib on eliquis, HTN, DM, PPM, R sided breast ca in the past, bronchiectasis (followed by Dr Hart), CRISTIN on CPAP at night, CHF, presenting with hypoxia. Pt was in usual state of health yesterday, per family and pt  today at 8pm was eating and began coughing and was febrile to 101 and dyspneic with hypoxia to 84% on RA which improved w/ NC, they did home labs w/ WBC 20 and lactate 2.2 so brought her to ED. Pt was recently admitted at [facility] 2 months ago for sepsis 2/2 aspiration pneumonia where she was admitted to the ICU requiring pressors.  Pt was seen in Dr. Hart's office 8/28 for follow up for bronchiectasis, complaining of persistent cough for 12 days, she was prescribed a 7 day course of cefdinir and acetylcysteine inhalation  	  In the ED:  Initial vital signs: T: 100.1F, HR: 131, BP: 90/52, R: 26, SpO2: 85% on RA  Labs: significant for WBC 17.05 (83% neutrophil predom), PT/INR 17.6/1.56, Lactate 2.5 >> 2, BUN 33, Glu 214, U/A glu >1000  Imaging:  CXR: no acute cardiopulmonary findings  EKG: afib, , QTc 464  Medications: zosyn 3.375g IV, tylenol 650mg PO, vanc 1.25g IV, LR 1.6L  Consults: none  (29 Aug 2023 02:58)        **********  Rapid Response Team Summary:    respiratory called first  follow by rapid response   Rapid response team called for hypoxia  Patient was seen and examined at the bedside by the rapid response team.    Allergies    No Known Allergies    Intolerances        PAST MEDICAL & SURGICAL HISTORY:  Chronic heart failure      Diabetes mellitus      CRISTIN on CPAP      HTN (hypertension)      Hyperlipidemia      History of permanent cardiac pacemaker placement          REVIEW OF SYSTEMS:   See HPI (as per chart review), unable to obtain 2/2 ETT and sedation    TEMPLATE ONLY:  General: no weight loss, fatigue, fever or chills, sleep changes  HEENT:  no headache, hearing change, vision changes, vertigo, congestion, rhinorrhea, epistaxis, sore throat  Respiratory: no cough/sputum, hemoptysis, SOB, wheezing, pleuritic pain  CV: no palpitation, dyspnea, diaphoresis, orthopnea, edema, pain,  no past clots in veins; no swelling in calves, legs or feet; no color change in fingertips or toes during cold weather; no swelling with redness or tenderness  GI: no N/V/D, constipation, appetite change, dysphagia, melena, abdominal pain, hemorrhoids  : no urinary frequency, urgency, dysuria, hematuria  Skin: no rashes, itching, dryness  MSK: no joint pain, myalgia, joint swelling, leg cramps  Endocrine: no hot/cold intolerance, polyuria, polydipsia, abnormal bleeding  Neurologic: no weakness, dizziness/syncope, seizures, tremor, paresthesias, depression, anxiety, confusion    Vital Signs Last 24 Hrs  T(C): 36.7 (29 Aug 2023 13:00), Max: 37.8 (29 Aug 2023 00:04)  T(F): 98.1 (29 Aug 2023 13:00), Max: 100.1 (29 Aug 2023 00:04)  HR: 65 (29 Aug 2023 13:00) (65 - 131)  BP: 92/56 (29 Aug 2023 13:00) (90/52 - 101/56)  BP(mean): 68 (29 Aug 2023 13:00) (68 - 68)  RR: 18 (29 Aug 2023 13:00) (18 - 26)  SpO2: 94% (29 Aug 2023 13:00) (85% - 94%)    Parameters below as of 29 Aug 2023 13:00  Patient On (Oxygen Delivery Method): nasal cannula  O2 Flow (L/min): 2      Vent settings   Mode: RR: TV: PEEP: FiO2        Physical Exam *NORMAL TEMPLATE*  GENERAL: The patient is awake and alert in no apparent distress.   HEENT: Head is normocephalic and atraumatic. Extraocular muscles are intact. Mucous membranes are moist. No throat erythema/exudates no lymphadenopathy, no JVD,   NECK: Supple.  LUNGS: Clear to auscultation BL without wheezing, rales or rhonchi; respirations unlabored  HEART: Regular rate and rhythm ,+S1/+S2, no murmurs, rubs, gallops  ABDOMEN: Soft, nontender, and nondistended, no rebound, guarding rigidity, bowel sounds in all 4 quadrants  EXTREMITIES: Without any cyanosis, clubbing, rash, lesions or edema.  SKIN: No new rashes or lesions.  MSK: strength equal BL  VASCULAR: Radial and Dorsal pedal pulses palpable BL  NEUROLOGIC: Grossly intact.  PSYCH: No new changes.           INCISIONS:    DRAINS:    LINES (DATES):  sites, dates, how site looks    LABS:                        13.5   17.05 )-----------( 191      ( 29 Aug 2023 00:42 )             41.9        08-29    137  |  99  |  33<H>  ----------------------------<  214<H>  4.5   |  24  |  0.86    Ca    10.4      29 Aug 2023 00:42    TPro  6.8  /  Alb  4.2  /  TBili  0.4  /  DBili  x   /  AST  13  /  ALT  13  /  AlkPhos  101  08-29            LIVER FUNCTIONS - ( 29 Aug 2023 00:42 )  Alb: 4.2 g/dL / Pro: 6.8 g/dL / ALK PHOS: 101 U/L / ALT: 13 U/L / AST: 13 U/L / GGT: x             PT/INR - ( 29 Aug 2023 00:42 )   PT: 17.6 sec;   INR: 1.56          PTT - ( 29 Aug 2023 00:42 )  PTT:33.1 sec    Urinalysis Basic - ( 29 Aug 2023 02:18 )    Color: Yellow / Appearance: Clear / SG: <=1.005 / pH: x  Gluc: x / Ketone: NEGATIVE  / Bili: Negative / Urobili: 0.2 E.U./dL   Blood: x / Protein: NEGATIVE mg/dL / Nitrite: NEGATIVE   Leuk Esterase: NEGATIVE / RBC: x / WBC x   Sq Epi: x / Non Sq Epi: x / Bacteria: x           Culture - Blood (collected 08-29-23 @ 02:07)  Source: .Blood Blood-Peripheral  Preliminary Report (08-29-23 @ 16:00):    No growth at 12 hours    Culture - Blood (collected 08-29-23 @ 02:07)  Source: .Blood Blood-Peripheral  Preliminary Report (08-29-23 @ 16:00):    No growth at 12 hours        IMAGING    Chest Xray? ____________    EKG? _________________    MEDICATIONS  MEDICATIONS  (STANDING):  acetylcysteine 10%  Inhalation 4 milliLiter(s) Inhalation every 4 hours  apixaban 5 milliGRAM(s) Oral every 12 hours  atorvastatin 40 milliGRAM(s) Oral at bedtime  dextrose 5%. 1000 milliLiter(s) (50 mL/Hr) IV Continuous <Continuous>  dextrose 5%. 1000 milliLiter(s) (100 mL/Hr) IV Continuous <Continuous>  dextrose 50% Injectable 25 Gram(s) IV Push once  dextrose 50% Injectable 12.5 Gram(s) IV Push once  dextrose 50% Injectable 25 Gram(s) IV Push once  digoxin     Tablet 125 MICROGram(s) Oral daily  escitalopram 20 milliGRAM(s) Oral daily  furosemide   Injectable 20 milliGRAM(s) IV Push once  furosemide   Injectable 40 milliGRAM(s) IV Push once  glucagon  Injectable 1 milliGRAM(s) IntraMuscular once  insulin lispro (ADMELOG) corrective regimen sliding scale   SubCutaneous three times a day before meals  insulin lispro (ADMELOG) corrective regimen sliding scale   SubCutaneous at bedtime  letrozole 2.5 milliGRAM(s) Oral daily  metoprolol succinate  milliGRAM(s) Oral every 24 hours  piperacillin/tazobactam IVPB.. 3.375 Gram(s) IV Intermittent every 8 hours  sacubitril 24 mG/valsartan 26 mG 1 Tablet(s) Oral every 12 hours  sodium chloride 7% Inhalation 4 milliLiter(s) Inhalation every 12 hours  spironolactone 25 milliGRAM(s) Oral daily  tiotropium 2.5 MICROgram(s) Inhaler 2 Puff(s) Inhalation daily  traZODone 150 milliGRAM(s) Oral at bedtime    MEDICATIONS  (PRN):  acetaminophen     Tablet .. 650 milliGRAM(s) Oral every 6 hours PRN Temp greater or equal to 38C (100.4F), Mild Pain (1 - 3)  dextrose Oral Gel 15 Gram(s) Oral once PRN Blood Glucose LESS THAN 70 milliGRAM(s)/deciliter      ASSESSMENT & PLAN    Assessment- Rapid Response called for 77y year old Female with a past medical history of     Plan-    FU labs  12 lead EKG   BiPAP STAT   Lasix IVP       step up to [address] ***Rapid Response Clinical Impact Nurse Practitioner Note***    INCOMPLETE TEMPLATE    Patient is a 77y old  Female admitted for HPI:  78 yo F w/ afib on eliquis, HTN, DM, PPM, R sided breast ca in the past, bronchiectasis (followed by Dr Hart), CRISTIN on CPAP at night, CHF, presenting with hypoxia. Pt was in usual state of health yesterday, per family and pt  today at 8pm was eating and began coughing and was febrile to 101 and dyspneic with hypoxia to 84% on RA which improved w/ NC, they did home labs w/ WBC 20 and lactate 2.2 so brought her to ED. Pt was recently admitted at [facility] 2 months ago for sepsis 2/2 aspiration pneumonia where she was admitted to the ICU requiring pressors.  Pt was seen in Dr. Hart's office 8/28 for follow up for bronchiectasis, complaining of persistent cough for 12 days, she was prescribed a 7 day course of cefdinir and acetylcysteine inhalation  	  In the ED:  Initial vital signs: T: 100.1F, HR: 131, BP: 90/52, R: 26, SpO2: 85% on RA  Labs: significant for WBC 17.05 (83% neutrophil predom), PT/INR 17.6/1.56, Lactate 2.5 >> 2, BUN 33, Glu 214, U/A glu >1000  Imaging:  CXR: no acute cardiopulmonary findings  EKG: afib, , QTc 464  Medications: zosyn 3.375g IV, tylenol 650mg PO, vanc 1.25g IV, LR 1.6L  Consults: none  (29 Aug 2023 02:58)    **********  Rapid Response Team Summary:    respiratory called first  follow by rapid response   Rapid response team called for hypoxia  Patient was seen and examined at the bedside by the rapid response team.    Allergies  No Known Allergies    PAST MEDICAL & SURGICAL HISTORY:  Chronic heart failure      Diabetes mellitus      CRISTIN on CPAP      HTN (hypertension)      Hyperlipidemia      History of permanent cardiac pacemaker placement          REVIEW OF SYSTEMS:   + SOB    TEMPLATE ONLY:  General: no weight loss, fatigue, fever or chills, sleep changes  HEENT:  no headache, hearing change, vision changes, vertigo, congestion, rhinorrhea, epistaxis, sore throat  Respiratory: no cough/sputum, hemoptysis, SOB, wheezing, pleuritic pain  CV: no palpitation, dyspnea, diaphoresis, orthopnea, edema, pain,  no past clots in veins; no swelling in calves, legs or feet; no color change in fingertips or toes during cold weather; no swelling with redness or tenderness  GI: no N/V/D, constipation, appetite change, dysphagia, melena, abdominal pain, hemorrhoids  : no urinary frequency, urgency, dysuria, hematuria  Skin: no rashes, itching, dryness  MSK: no joint pain, myalgia, joint swelling, leg cramps  Endocrine: no hot/cold intolerance, polyuria, polydipsia, abnormal bleeding  Neurologic: no weakness, dizziness/syncope, seizures, tremor, paresthesias, depression, anxiety, confusion    Vital Signs Last 24 Hrs  T(C): 36.7 (29 Aug 2023 13:00), Max: 37.8 (29 Aug 2023 00:04)  T(F): 98.1 (29 Aug 2023 13:00), Max: 100.1 (29 Aug 2023 00:04)  HR: 65 (29 Aug 2023 13:00) (65 - 131)  BP: 92/56 (29 Aug 2023 13:00) (90/52 - 101/56)  BP(mean): 68 (29 Aug 2023 13:00) (68 - 68)  RR: 18 (29 Aug 2023 13:00) (18 - 26)  SpO2: 94% (29 Aug 2023 13:00) (85% - 94%)    Parameters below as of 29 Aug 2023 13:00  Patient On (Oxygen Delivery Method): nasal cannula  O2 Flow (L/min): 2      Physical Exam *NORMAL TEMPLATE*  GENERAL: The patient is awake and alert in no apparent distress.   HEENT: Head is normocephalic and atraumatic. Extraocular muscles are intact. Mucous membranes are moist. No throat erythema/exudates no lymphadenopathy, no JVD,   NECK: Supple.  LUNGS: Clear to auscultation BL without wheezing, rales or rhonchi; respirations unlabored  HEART: Regular rate and rhythm ,+S1/+S2, no murmurs, rubs, gallops  ABDOMEN: Soft, nontender, and nondistended, no rebound, guarding rigidity, bowel sounds in all 4 quadrants  EXTREMITIES: Without any cyanosis, clubbing, rash, lesions or edema.  SKIN: No new rashes or lesions.  MSK: strength equal BL  VASCULAR: Radial and Dorsal pedal pulses palpable BL  NEUROLOGIC: Grossly intact.  PSYCH: No new changes.  LINES (DATES):  sites, dates, how site looks    LABS:                        13.5   17.05 )-----------( 191      ( 29 Aug 2023 00:42 )             41.9        08-29    137  |  99  |  33<H>  ----------------------------<  214<H>  4.5   |  24  |  0.86    Ca    10.4      29 Aug 2023 00:42    TPro  6.8  /  Alb  4.2  /  TBili  0.4  /  DBili  x   /  AST  13  /  ALT  13  /  AlkPhos  101  08-29            LIVER FUNCTIONS - ( 29 Aug 2023 00:42 )  Alb: 4.2 g/dL / Pro: 6.8 g/dL / ALK PHOS: 101 U/L / ALT: 13 U/L / AST: 13 U/L / GGT: x         PT/INR - ( 29 Aug 2023 00:42 )   PT: 17.6 sec;   INR: 1.56     PTT - ( 29 Aug 2023 00:42 )  PTT:33.1 sec    Urinalysis Basic - ( 29 Aug 2023 02:18 )    Color: Yellow / Appearance: Clear / SG: <=1.005 / pH: x  Gluc: x / Ketone: NEGATIVE  / Bili: Negative / Urobili: 0.2 E.U./dL   Blood: x / Protein: NEGATIVE mg/dL / Nitrite: NEGATIVE   Leuk Esterase: NEGATIVE / RBC: x / WBC x   Sq Epi: x / Non Sq Epi: x / Bacteria: x    Culture - Blood (collected 08-29-23 @ 02:07)  Source: .Blood Blood-Peripheral  Preliminary Report (08-29-23 @ 16:00):    No growth at 12 hours    Culture - Blood (collected 08-29-23 @ 02:07)  Source: .Blood Blood-Peripheral  Preliminary Report (08-29-23 @ 16:00):    No growth at 12 hours    MEDICATIONS  MEDICATIONS  (STANDING):  acetylcysteine 10%  Inhalation 4 milliLiter(s) Inhalation every 4 hours  apixaban 5 milliGRAM(s) Oral every 12 hours  atorvastatin 40 milliGRAM(s) Oral at bedtime  dextrose 5%. 1000 milliLiter(s) (50 mL/Hr) IV Continuous <Continuous>  dextrose 5%. 1000 milliLiter(s) (100 mL/Hr) IV Continuous <Continuous>  dextrose 50% Injectable 25 Gram(s) IV Push once  dextrose 50% Injectable 12.5 Gram(s) IV Push once  dextrose 50% Injectable 25 Gram(s) IV Push once  digoxin     Tablet 125 MICROGram(s) Oral daily  escitalopram 20 milliGRAM(s) Oral daily  furosemide   Injectable 20 milliGRAM(s) IV Push once  furosemide   Injectable 40 milliGRAM(s) IV Push once  glucagon  Injectable 1 milliGRAM(s) IntraMuscular once  insulin lispro (ADMELOG) corrective regimen sliding scale   SubCutaneous three times a day before meals  insulin lispro (ADMELOG) corrective regimen sliding scale   SubCutaneous at bedtime  letrozole 2.5 milliGRAM(s) Oral daily  metoprolol succinate  milliGRAM(s) Oral every 24 hours  piperacillin/tazobactam IVPB.. 3.375 Gram(s) IV Intermittent every 8 hours  sacubitril 24 mG/valsartan 26 mG 1 Tablet(s) Oral every 12 hours  sodium chloride 7% Inhalation 4 milliLiter(s) Inhalation every 12 hours  spironolactone 25 milliGRAM(s) Oral daily  tiotropium 2.5 MICROgram(s) Inhaler 2 Puff(s) Inhalation daily  traZODone 150 milliGRAM(s) Oral at bedtime    MEDICATIONS  (PRN):  acetaminophen     Tablet .. 650 milliGRAM(s) Oral every 6 hours PRN Temp greater or equal to 38C (100.4F), Mild Pain (1 - 3)  dextrose Oral Gel 15 Gram(s) Oral once PRN Blood Glucose LESS THAN 70 milliGRAM(s)/deciliter      ASSESSMENT & PLAN    Assessment- Rapid Response called for 77y year old Female with a past medical history of     Plan-    F/U labs  12 lead EKG   HFNC -> NRB for transport (pt. with increased secretions)   Lasix IVP   CXR  Pulm Toileting/Airway Clearance   No ABG/Lactate as per Dr. Allen    Case discussed with Dr. Hart and [facility] Fellow, stepped up to [address].    I have personally and independently provided 31 minutes of critical care services.  This excludes any time spent on separate procedures or teaching. ***Rapid Response Clinical Impact Nurse Practitioner Note***    HPI per Chart Review:  78 yo F w/ afib on eliquis, HTN, DM, PPM, R sided breast ca in the past, bronchiectasis (followed by Dr Hart), CRISTIN on CPAP at night, CHF, presenting with hypoxia. Pt was in usual state of health yesterday, per family and pt  today at 8pm was eating and began coughing and was febrile to 101 and dyspneic with hypoxia to 84% on RA which improved w/ NC, they did home labs w/ WBC 20 and lactate 2.2 so brought her to ED. Pt was recently admitted at [facility] 2 months ago for sepsis 2/2 aspiration pneumonia where she was admitted to the ICU requiring pressors.  Pt was seen in Dr. Hart's office 8/28 for follow up for bronchiectasis, complaining of persistent cough for 12 days, she was prescribed a 7 day course of cefdinir and acetylcysteine inhalation  	  In the ED:  Initial vital signs: T: 100.1F, HR: 131, BP: 90/52, R: 26, SpO2: 85% on RA  Labs: significant for WBC 17.05 (83% neutrophil predom), PT/INR 17.6/1.56, Lactate 2.5 >> 2, BUN 33, Glu 214, U/A glu >1000  Imaging:  CXR: no acute cardiopulmonary findings  EKG: afib, , QTc 464  Medications: zosyn 3.375g IV, tylenol 650mg PO, vanc 1.25g IV, LR 1.6L  Consults: none  (29 Aug 2023 02:58)    **********  Rapid Response Team Summary:    Respiratory STAT paged over head @ 1729, followed by a Rapid Response @ 1731 hypoxia and increased work of breathing. As per primary team, patient was being evaluated for her c/o SOB and became more tachypneic so a rapid response was called. Upon arrival by writer, patient on Zoll monitor with -150s, -160s, O2 sat 88% on nasal cannula, RR 42-44 with accessory and abdominal muscle use. NRB @ 100% applied and improvement in o2 sat to 91%. Patient with bilateral crackles and increased oral secretions, lasix IVP ordered. A new PIV was placed by writer, labs were drawn and Lasix administered via new PIV line. Decision made to place patient on HFNC as patient has increased secretions and could be at risk for mucus plugging if placed on BiPAP. Upon POCUS by PCCM Fellow Rubin, a full bladder was noted and a Ferrer catheter was placed with about 1.5L of urine drained. Respiratory rate slightly improved to 32-35 bpm and patient transported to [facility] for closer monitoring of respiratory status.       Allergies  No Known Allergies    PAST MEDICAL & SURGICAL HISTORY:  Chronic heart failure  Diabetes mellitus  CRISTIN on CPAP  HTN (hypertension)  Hyperlipidemia  History of permanent cardiac pacemaker placement    REVIEW OF SYSTEMS:   + SOB  Rest of ROS otherwise negative    Vital Signs Last 24 Hrs  T(C): 36.7 (29 Aug 2023 13:00), Max: 37.8 (29 Aug 2023 00:04)  T(F): 98.1 (29 Aug 2023 13:00), Max: 100.1 (29 Aug 2023 00:04)  HR: 65 (29 Aug 2023 13:00) (65 - 131)  BP: 92/56 (29 Aug 2023 13:00) (90/52 - 101/56)  BP(mean): 68 (29 Aug 2023 13:00) (68 - 68)  RR: 18 (29 Aug 2023 13:00) (18 - 26)  SpO2: 94% (29 Aug 2023 13:00) (85% - 94%)    Parameters below as of 29 Aug 2023 13:00  Patient On (Oxygen Delivery Method): nasal cannula  O2 Flow (L/min): 2    Physical Exam *NORMAL TEMPLATE*  GENERAL: The patient is awake and in respiratory distress, able to answer questions.  HEENT: Head is normocephalic.  Mucous membranes are moist. No JVD.   NECK: Supple.  LUNGS: Crackles bilaterally, decreased at bases. Respirations labored.  HEART: Tachycardic, irregular rate and rhythm ,+S1/+S2.  ABDOMEN: Soft, nontender, and nondistended, no rebound, guarding rigidity, BS.  EXTREMITIES: Without any cyanosis, clubbing, rash, lesions. +2 lower extremity edema.   SKIN: No new rashes or lesions.  MSK: strength equal BL.  VASCULAR: +2 Radial and Dorsal pedal pulses palpable BL.  NEUROLOGIC: Grossly intact.  PSYCH: No new changes.  LINES (DATES): PIV x 2 (1 new placed by writer)    LABS:                        13.5   17.05 )-----------( 191      ( 29 Aug 2023 00:42 )             41.9        08-29    137  |  99  |  33<H>  ----------------------------<  214<H>  4.5   |  24  |  0.86    Ca    10.4      29 Aug 2023 00:42    TPro  6.8  /  Alb  4.2  /  TBili  0.4  /  DBili  x   /  AST  13  /  ALT  13  /  AlkPhos  101  08-29      LIVER FUNCTIONS - ( 29 Aug 2023 00:42 )  Alb: 4.2 g/dL / Pro: 6.8 g/dL / ALK PHOS: 101 U/L / ALT: 13 U/L / AST: 13 U/L / GGT: x         PT/INR - ( 29 Aug 2023 00:42 )   PT: 17.6 sec;   INR: 1.56     PTT - ( 29 Aug 2023 00:42 )  PTT:33.1 sec    Urinalysis Basic - ( 29 Aug 2023 02:18 )    Color: Yellow / Appearance: Clear / SG: <=1.005 / pH: x  Gluc: x / Ketone: NEGATIVE  / Bili: Negative / Urobili: 0.2 E.U./dL   Blood: x / Protein: NEGATIVE mg/dL / Nitrite: NEGATIVE   Leuk Esterase: NEGATIVE / RBC: x / WBC x   Sq Epi: x / Non Sq Epi: x / Bacteria: x    Culture - Blood (collected 08-29-23 @ 02:07)  Source: .Blood Blood-Peripheral  Preliminary Report (08-29-23 @ 16:00):    No growth at 12 hours    Culture - Blood (collected 08-29-23 @ 02:07)  Source: .Blood Blood-Peripheral  Preliminary Report (08-29-23 @ 16:00):    No growth at 12 hours    MEDICATIONS  MEDICATIONS  (STANDING):  acetylcysteine 10%  Inhalation 4 milliLiter(s) Inhalation every 4 hours  apixaban 5 milliGRAM(s) Oral every 12 hours  atorvastatin 40 milliGRAM(s) Oral at bedtime  dextrose 5%. 1000 milliLiter(s) (50 mL/Hr) IV Continuous <Continuous>  dextrose 5%. 1000 milliLiter(s) (100 mL/Hr) IV Continuous <Continuous>  dextrose 50% Injectable 25 Gram(s) IV Push once  dextrose 50% Injectable 12.5 Gram(s) IV Push once  dextrose 50% Injectable 25 Gram(s) IV Push once  digoxin     Tablet 125 MICROGram(s) Oral daily  escitalopram 20 milliGRAM(s) Oral daily  furosemide   Injectable 20 milliGRAM(s) IV Push once  furosemide   Injectable 40 milliGRAM(s) IV Push once  glucagon  Injectable 1 milliGRAM(s) IntraMuscular once  insulin lispro (ADMELOG) corrective regimen sliding scale   SubCutaneous three times a day before meals  insulin lispro (ADMELOG) corrective regimen sliding scale   SubCutaneous at bedtime  letrozole 2.5 milliGRAM(s) Oral daily  metoprolol succinate  milliGRAM(s) Oral every 24 hours  piperacillin/tazobactam IVPB.. 3.375 Gram(s) IV Intermittent every 8 hours  sacubitril 24 mG/valsartan 26 mG 1 Tablet(s) Oral every 12 hours  sodium chloride 7% Inhalation 4 milliLiter(s) Inhalation every 12 hours  spironolactone 25 milliGRAM(s) Oral daily  tiotropium 2.5 MICROgram(s) Inhaler 2 Puff(s) Inhalation daily  traZODone 150 milliGRAM(s) Oral at bedtime    MEDICATIONS  (PRN):  acetaminophen     Tablet .. 650 milliGRAM(s) Oral every 6 hours PRN Temp greater or equal to 38C (100.4F), Mild Pain (1 - 3)  dextrose Oral Gel 15 Gram(s) Oral once PRN Blood Glucose LESS THAN 70 milliGRAM(s)/deciliter      ASSESSMENT & PLAN    Assessment- Patient is a 78 yo F w/ afib on eliquis, HTN, DM, PPM, R sided breast ca in the past, bronchiectasis (followed by Dr Hart), CRISTIN on CPAP at night, CHF, presenting with hypoxia at home following aspiration event, found to have AHRF and severe sepsis likely 2/2 to bronchiectasis exacerbation vs aspiration pneumonitis now s/p Rapid Response for hypoxia likely 2/2 flash pulmonary edema.    Plan-  F/U Rapid Response labs drawn  12 lead EKG with Afib   HFNC -> NRB for transport (pt. with increased secretions)   Lasix IVP  CXR  Pulm Toileting/Airway Clearance   No ABG/Lactate as per Dr. Allne  TTE  Ferrer as patient retaining urine and requires strict I&O  Rest of care per primary team    Case discussed with Dr. Hart and [facility] Fellow, stepped up to [address].    I have personally and independently provided 31 minutes of critical care services.  This excludes any time spent on separate procedures or teaching.

## 2023-08-29 NOTE — H&P ADULT - NSHPLABSRESULTS_GEN_ALL_CORE
.  LABS:                         13.5   17.05 )-----------( 191      ( 29 Aug 2023 00:42 )             41.9     08-29    137  |  99  |  33<H>  ----------------------------<  214<H>  4.5   |  24  |  0.86    Ca    10.4      29 Aug 2023 00:42    TPro  6.8  /  Alb  4.2  /  TBili  0.4  /  DBili  x   /  AST  13  /  ALT  13  /  AlkPhos  101  08-29    PT/INR - ( 29 Aug 2023 00:42 )   PT: 17.6 sec;   INR: 1.56          PTT - ( 29 Aug 2023 00:42 )  PTT:33.1 sec  Urinalysis Basic - ( 29 Aug 2023 02:18 )    Color: Yellow / Appearance: Clear / SG: <=1.005 / pH: x  Gluc: x / Ketone: NEGATIVE  / Bili: Negative / Urobili: 0.2 E.U./dL   Blood: x / Protein: NEGATIVE mg/dL / Nitrite: NEGATIVE   Leuk Esterase: NEGATIVE / RBC: x / WBC x   Sq Epi: x / Non Sq Epi: x / Bacteria: x        Lactate, Blood: 2.0 mmol/L (08-29 @ 02:18)  Lactate, Blood: 2.5 mmol/L (08-29 @ 00:42)      RADIOLOGY, EKG & ADDITIONAL TESTS: Reviewed.

## 2023-08-29 NOTE — H&P ADULT - PROBLEM SELECTOR PLAN 2
Pt reportedly hypoxic to 84% prior to presentation, on intermittent supplemental O2 at home. Witnessed aspiration event likely precipitating hypoxia    Plan:  - Wean O2 as tolerate  - Maintain aspiration precautions  - Cont acetylcysteine inhalation  - cont spiriva inhaler

## 2023-08-29 NOTE — PROGRESS NOTE ADULT - SUBJECTIVE AND OBJECTIVE BOX
TRANSFER FROM [facility] TO MICU  Hospital Course:    76 yo F w/ afib on eliquis and Digoxin, HTN, DM, s/p PPM, R sided breast ca in the past, bronchiectasis (followed by Dr Hart), CRISTIN on CPAP at night, HFrEF, who presented with acute hypoxia and increased shortness of breath. Patient presented on 8/28 for increased cough and worsening shortness of breath with hypoxia to 85% on RA at home. Per family, patient has had increased cough over the past few days and was evaluated outpatient and prescribed a 7 day course of cefdinir and acetylcysteine inhalation. On arrival to the ED, patient was febrile, tachycardic and 85% on RA. Pt was started on vanc and zosyn for presumed aspiration pneumonia and was admitted to [facility]. Of note, patient was recently admitted at [facility] 2 months ago for sepsis 2/2 aspiration pneumonia where she was admitted to the ICU requiring pressors. Rapid response was called on 8/29 for episode of acute shortness of breath/tachypnea with hypoxia to mid 80s on 4L NC. Patient was hypertensive to 150-160s systolic and HR was 150. Crackles were heard on exam and patient received Lasix 40mg IVPx1. Ferrer catheter was placed for urinary retention and patient was placed on HFNC. Decision was made to transfer the patient to MICU for closer airway monitoring and chest PT.    SUBJECTIVE:  Exam this AM: Patient seen and examined at bedside. Reports shortness of breath has improved and cough continues to be productive.     Vital Signs Last 12 Hrs  T(F): 98.1 (08-29-23 @ 13:00), Max: 98.1 (08-29-23 @ 13:00)  HR: 65 (08-29-23 @ 13:00) (65 - 65)  BP: 92/56 (08-29-23 @ 13:00) (92/56 - 92/56)  BP(mean): 68 (08-29-23 @ 13:00) (68 - 68)  RR: 18 (08-29-23 @ 13:00) (18 - 18)  SpO2: 94% (08-29-23 @ 13:00) (94% - 94%)  I&O's Summary      PHYSICAL EXAM:  GENERAL: NAD, lying in bed comfortably, speaking in full sentences on 2L NC  HEAD:  Atraumatic, Normocephalic  EYES: EOMI, PERRLA, conjunctiva and sclera clear  ENT: Moist mucous membranes  NECK: Supple, No JVD  CHEST/LUNG: no use of accessory muscles, crackles at b/l bases  HEART: Regular rate and rhythm; No murmurs, rubs, or gallops  ABDOMEN: obese, nontender, no rebound or gaurding  EXTREMITIES:  2+ Peripheral Pulses  NERVOUS SYSTEM:  Alert & Oriented X3      LABS:                        15.1   19.37 )-----------( 229      ( 29 Aug 2023 17:46 )             48.0     08-29    136  |  103  |  18  ----------------------------<  224<H>  See Note   |  23  |  0.66    Ca    10.7<H>      29 Aug 2023 17:46  Phos  4.3     08-29  Mg     2.5     08-29    TPro  7.5  /  Alb  4.1  /  TBili  0.5  /  DBili  x   /  AST  See Note  /  ALT  See Note  /  AlkPhos  98  08-29    PT/INR - ( 29 Aug 2023 18:04 )   PT: 12.7 sec;   INR: 1.12          PTT - ( 29 Aug 2023 18:04 )  PTT:32.7 sec  Urinalysis Basic - ( 29 Aug 2023 18:32 )    Color: Yellow / Appearance: Clear / SG: <=1.005 / pH: x  Gluc: x / Ketone: NEGATIVE  / Bili: Negative / Urobili: 0.2 E.U./dL   Blood: x / Protein: NEGATIVE mg/dL / Nitrite: NEGATIVE   Leuk Esterase: NEGATIVE / RBC: x / WBC x   Sq Epi: x / Non Sq Epi: x / Bacteria: x          RADIOLOGY & ADDITIONAL TESTS:    MEDICATIONS  (STANDING):  albuterol/ipratropium for Nebulization 3 milliLiter(s) Nebulizer every 6 hours  apixaban 5 milliGRAM(s) Oral every 12 hours  atorvastatin 40 milliGRAM(s) Oral at bedtime  chlorhexidine 2% Cloths 1 Application(s) Topical <User Schedule>  dextrose 5%. 1000 milliLiter(s) (50 mL/Hr) IV Continuous <Continuous>  dextrose 5%. 1000 milliLiter(s) (100 mL/Hr) IV Continuous <Continuous>  dextrose 50% Injectable 25 Gram(s) IV Push once  dextrose 50% Injectable 12.5 Gram(s) IV Push once  dextrose 50% Injectable 25 Gram(s) IV Push once  digoxin     Tablet 125 MICROGram(s) Oral daily  escitalopram 20 milliGRAM(s) Oral daily  furosemide   Injectable 40 milliGRAM(s) IV Push once  glucagon  Injectable 1 milliGRAM(s) IntraMuscular once  insulin lispro (ADMELOG) corrective regimen sliding scale   SubCutaneous three times a day before meals  insulin lispro (ADMELOG) corrective regimen sliding scale   SubCutaneous at bedtime  letrozole 2.5 milliGRAM(s) Oral daily  metoprolol succinate  milliGRAM(s) Oral every 24 hours  piperacillin/tazobactam IVPB. 4.5 Gram(s) IV Intermittent once  piperacillin/tazobactam IVPB.- 4.5 Gram(s) IV Intermittent once  sacubitril 24 mG/valsartan 26 mG 1 Tablet(s) Oral every 12 hours  sodium chloride 3%  Inhalation 4 milliLiter(s) Inhalation every 8 hours  spironolactone 25 milliGRAM(s) Oral daily  tiotropium 2.5 MICROgram(s) Inhaler 2 Puff(s) Inhalation daily  traZODone 150 milliGRAM(s) Oral at bedtime    MEDICATIONS  (PRN):  dextrose Oral Gel 15 Gram(s) Oral once PRN Blood Glucose LESS THAN 70 milliGRAM(s)/deciliter

## 2023-08-29 NOTE — H&P ADULT - PROBLEM SELECTOR PLAN 3
Pt presenting following choking event, noted to be hypoxic at home. Recent hospitilization at [facility] for aspiration PNA, requiring pressors in ICU. Pt has hx of tracheobronchomalacia, barium swallow 8/2 w/ findings of calcifications on laryngeal cartilage. Treating with Zosyn iso severe sepsis     Plan:  - NPO pending S &S assessment  - F/U Speech and swallow  - Aspiration precautions

## 2023-08-29 NOTE — PROGRESS NOTE ADULT - SUBJECTIVE AND OBJECTIVE BOX
Patient is a 77y old  Female who presents with a chief complaint of AHRF (29 Aug 2023 08:45)      INTERVAL HPI/OVERNIGHT EVENTS:   No overnight events   Afebrile, hemodynamically stable     ICU Vital Signs Last 24 Hrs  T(C): 36.7 (29 Aug 2023 13:00), Max: 37.8 (29 Aug 2023 00:04)  T(F): 98.1 (29 Aug 2023 13:00), Max: 100.1 (29 Aug 2023 00:04)  HR: 65 (29 Aug 2023 13:00) (65 - 131)  BP: 92/56 (29 Aug 2023 13:00) (90/52 - 101/56)  BP(mean): 68 (29 Aug 2023 13:00) (68 - 68)  ABP: --  ABP(mean): --  RR: 18 (29 Aug 2023 13:00) (18 - 26)  SpO2: 94% (29 Aug 2023 13:00) (85% - 94%)    O2 Parameters below as of 29 Aug 2023 13:00  Patient On (Oxygen Delivery Method): nasal cannula  O2 Flow (L/min): 2        I&O's Summary        LABS:                        15.1   19.37 )-----------( 229      ( 29 Aug 2023 17:46 )             48.0     08-29    136  |  103  |  x   ----------------------------<  224<H>  x    |  23  |  0.66    Ca    10.4      29 Aug 2023 00:42  Phos  4.3     08-29  Mg     2.5     08-29    TPro  6.8  /  Alb  4.2  /  TBili  0.4  /  DBili  x   /  AST  13  /  ALT  13  /  AlkPhos  101  08-29    PT/INR - ( 29 Aug 2023 18:04 )   PT: 12.7 sec;   INR: 1.12          PTT - ( 29 Aug 2023 18:04 )  PTT:32.7 sec  Urinalysis Basic - ( 29 Aug 2023 17:46 )    Color: x / Appearance: x / SG: x / pH: x  Gluc: 224 mg/dL / Ketone: x  / Bili: x / Urobili: x   Blood: x / Protein: x / Nitrite: x   Leuk Esterase: x / RBC: x / WBC x   Sq Epi: x / Non Sq Epi: x / Bacteria: x      CAPILLARY BLOOD GLUCOSE      POCT Blood Glucose.: 135 mg/dL (29 Aug 2023 12:13)  POCT Blood Glucose.: 127 mg/dL (29 Aug 2023 08:44)        RADIOLOGY & ADDITIONAL TESTS:    Consultant(s) Notes Reviewed:  [x ] YES  [ ] NO    MEDICATIONS  (STANDING):  albuterol/ipratropium for Nebulization 3 milliLiter(s) Nebulizer every 6 hours  apixaban 5 milliGRAM(s) Oral every 12 hours  atorvastatin 40 milliGRAM(s) Oral at bedtime  chlorhexidine 2% Cloths 1 Application(s) Topical <User Schedule>  dextrose 5%. 1000 milliLiter(s) (50 mL/Hr) IV Continuous <Continuous>  dextrose 5%. 1000 milliLiter(s) (100 mL/Hr) IV Continuous <Continuous>  dextrose 50% Injectable 25 Gram(s) IV Push once  dextrose 50% Injectable 12.5 Gram(s) IV Push once  dextrose 50% Injectable 25 Gram(s) IV Push once  digoxin     Tablet 125 MICROGram(s) Oral daily  escitalopram 20 milliGRAM(s) Oral daily  furosemide   Injectable 40 milliGRAM(s) IV Push once  glucagon  Injectable 1 milliGRAM(s) IntraMuscular once  insulin lispro (ADMELOG) corrective regimen sliding scale   SubCutaneous three times a day before meals  insulin lispro (ADMELOG) corrective regimen sliding scale   SubCutaneous at bedtime  letrozole 2.5 milliGRAM(s) Oral daily  metoprolol succinate  milliGRAM(s) Oral every 24 hours  piperacillin/tazobactam IVPB. 4.5 Gram(s) IV Intermittent once  piperacillin/tazobactam IVPB.- 4.5 Gram(s) IV Intermittent once  sacubitril 24 mG/valsartan 26 mG 1 Tablet(s) Oral every 12 hours  sodium chloride 3%  Inhalation 4 milliLiter(s) Inhalation every 8 hours  spironolactone 25 milliGRAM(s) Oral daily  tiotropium 2.5 MICROgram(s) Inhaler 2 Puff(s) Inhalation daily  traZODone 150 milliGRAM(s) Oral at bedtime    MEDICATIONS  (PRN):  dextrose Oral Gel 15 Gram(s) Oral once PRN Blood Glucose LESS THAN 70 milliGRAM(s)/deciliter      PHYSICAL EXAM:  GENERAL: appears to be in respiratory distress  HEAD:  Atraumatic, Normocephalic  EYES: EOMI, PERRLA, conjunctiva and sclera clear  ENT: Moist mucous membranes  NECK: Supple, No JVD  CHEST/LUNG: on nonrebreather, use of accessory muscles,  HEART: Regular rate and rhythm; No murmurs, rubs, or gallops  ABDOMEN: obese, nontender, no rebound or gaurding  EXTREMITIES:  2+ Peripheral Pulses, brisk capillary refill. No clubbing, cyanosis, or edema  NERVOUS SYSTEM:  Alert & Oriented X3, speech clear. No deficits   MSK: FROM all 4 extremities, full and equal strength  SKIN: No rashes or lesions    Care Discussed with Consultants/Other Providers [ x] YES  [ ] NO ********TRANSFER ACCEPTANCE FROM [facility] TO MICU********    HOSPITAL COURSE:  76 yo F w/ afib on eliquis, HTN, DM, PPM, R sided breast ca in the past, bronchiectasis (followed by Dr Hart), CRISTIN on CPAP at night, CHF, presenting with hypoxia at home following aspiration event, found to have AHRF and severe sepsis likely 2/2 to  aspiration pneumonitis. Pt became hypoxic while on F, a RR was called and the patient was put on NRB and transferred to the MICU      INTERVAL HPI/OVERNIGHT EVENTS:   Pt transferred to MICU from [facility] after AHRF episode    ICU Vital Signs Last 24 Hrs  T(C): 36.7 (29 Aug 2023 13:00), Max: 37.8 (29 Aug 2023 00:04)  T(F): 98.1 (29 Aug 2023 13:00), Max: 100.1 (29 Aug 2023 00:04)  HR: 65 (29 Aug 2023 13:00) (65 - 131)  BP: 92/56 (29 Aug 2023 13:00) (90/52 - 101/56)  BP(mean): 68 (29 Aug 2023 13:00) (68 - 68)  ABP: --  ABP(mean): --  RR: 18 (29 Aug 2023 13:00) (18 - 26)  SpO2: 94% (29 Aug 2023 13:00) (85% - 94%)    O2 Parameters below as of 29 Aug 2023 13:00  Patient On (Oxygen Delivery Method): nasal cannula  O2 Flow (L/min): 2        I&O's Summary        LABS:                        15.1   19.37 )-----------( 229      ( 29 Aug 2023 17:46 )             48.0     08-29    136  |  103  |  x   ----------------------------<  224<H>  x    |  23  |  0.66    Ca    10.4      29 Aug 2023 00:42  Phos  4.3     08-29  Mg     2.5     08-29    TPro  6.8  /  Alb  4.2  /  TBili  0.4  /  DBili  x   /  AST  13  /  ALT  13  /  AlkPhos  101  08-29    PT/INR - ( 29 Aug 2023 18:04 )   PT: 12.7 sec;   INR: 1.12          PTT - ( 29 Aug 2023 18:04 )  PTT:32.7 sec  Urinalysis Basic - ( 29 Aug 2023 17:46 )    Color: x / Appearance: x / SG: x / pH: x  Gluc: 224 mg/dL / Ketone: x  / Bili: x / Urobili: x   Blood: x / Protein: x / Nitrite: x   Leuk Esterase: x / RBC: x / WBC x   Sq Epi: x / Non Sq Epi: x / Bacteria: x      CAPILLARY BLOOD GLUCOSE      POCT Blood Glucose.: 135 mg/dL (29 Aug 2023 12:13)  POCT Blood Glucose.: 127 mg/dL (29 Aug 2023 08:44)        RADIOLOGY & ADDITIONAL TESTS:    Consultant(s) Notes Reviewed:  [x ] YES  [ ] NO    MEDICATIONS  (STANDING):  albuterol/ipratropium for Nebulization 3 milliLiter(s) Nebulizer every 6 hours  apixaban 5 milliGRAM(s) Oral every 12 hours  atorvastatin 40 milliGRAM(s) Oral at bedtime  chlorhexidine 2% Cloths 1 Application(s) Topical <User Schedule>  dextrose 5%. 1000 milliLiter(s) (50 mL/Hr) IV Continuous <Continuous>  dextrose 5%. 1000 milliLiter(s) (100 mL/Hr) IV Continuous <Continuous>  dextrose 50% Injectable 25 Gram(s) IV Push once  dextrose 50% Injectable 12.5 Gram(s) IV Push once  dextrose 50% Injectable 25 Gram(s) IV Push once  digoxin     Tablet 125 MICROGram(s) Oral daily  escitalopram 20 milliGRAM(s) Oral daily  furosemide   Injectable 40 milliGRAM(s) IV Push once  glucagon  Injectable 1 milliGRAM(s) IntraMuscular once  insulin lispro (ADMELOG) corrective regimen sliding scale   SubCutaneous three times a day before meals  insulin lispro (ADMELOG) corrective regimen sliding scale   SubCutaneous at bedtime  letrozole 2.5 milliGRAM(s) Oral daily  metoprolol succinate  milliGRAM(s) Oral every 24 hours  piperacillin/tazobactam IVPB. 4.5 Gram(s) IV Intermittent once  piperacillin/tazobactam IVPB.- 4.5 Gram(s) IV Intermittent once  sacubitril 24 mG/valsartan 26 mG 1 Tablet(s) Oral every 12 hours  sodium chloride 3%  Inhalation 4 milliLiter(s) Inhalation every 8 hours  spironolactone 25 milliGRAM(s) Oral daily  tiotropium 2.5 MICROgram(s) Inhaler 2 Puff(s) Inhalation daily  traZODone 150 milliGRAM(s) Oral at bedtime    MEDICATIONS  (PRN):  dextrose Oral Gel 15 Gram(s) Oral once PRN Blood Glucose LESS THAN 70 milliGRAM(s)/deciliter      PHYSICAL EXAM:  GENERAL: appears to be in respiratory distress  HEAD:  Atraumatic, Normocephalic  EYES: EOMI, PERRLA, conjunctiva and sclera clear  ENT: Moist mucous membranes  NECK: Supple, No JVD  CHEST/LUNG: on nonrebreather, use of accessory muscles,  HEART: Regular rate and rhythm; No murmurs, rubs, or gallops  ABDOMEN: obese, nontender, no rebound or gaurding  EXTREMITIES:  2+ Peripheral Pulses, brisk capillary refill. No clubbing, cyanosis, or edema  NERVOUS SYSTEM:  Alert & Oriented X3, speech clear. No deficits   MSK: FROM all 4 extremities, full and equal strength  SKIN: No rashes or lesions    Care Discussed with Consultants/Other Providers [ x] YES  [ ] NO ********TRANSFER ACCEPTANCE FROM [facility] TO MICU********    HOSPITAL COURSE:  78 yo F w/ afib on eliquis and Digoxin, HTN, DM, s/p PPM, R sided breast ca in the past, bronchiectasis (followed by Dr Hart), CRISTIN on CPAP at night, HFrEF, who presented with acute hypoxia and increased shortness of breath. Patient presented on 8/28 for increased cough and worsening shortness of breath with hypoxia to 85% on RA at home. Per family, patient has had increased cough over the past few days and was evaluated outpatient and prescribed a 7 day course of cefdinir and acetylcysteine inhalation. On arrival to the ED, patient was febrile, tachycardic and 85% on RA. Pt was started on vanc and zosyn for presumed aspiration pneumonia and was admitted to [facility]. Of note, patient was recently admitted at [facility] 2 months ago for sepsis 2/2 aspiration pneumonia where she was admitted to the ICU requiring pressors. Rapid response was called on 8/29 for episode of acute shortness of breath/tachypnea with hypoxia to mid 80s on 4L NC. Patient was hypertensive to 150-160s systolic and HR was 150. Crackles were heard on exam and patient received Lasix 40mg IVPx1. Ferrer catheter was placed for urinary retention and patient was placed on HFNC. Decision was made to transfer the patient to MICU for closer airway monitoring and chest PT.    INTERVAL HPI/OVERNIGHT EVENTS:   Pt transferred to MICU from [facility] after AHRF episode    ICU Vital Signs Last 24 Hrs  T(C): 36.7 (29 Aug 2023 13:00), Max: 37.8 (29 Aug 2023 00:04)  T(F): 98.1 (29 Aug 2023 13:00), Max: 100.1 (29 Aug 2023 00:04)  HR: 65 (29 Aug 2023 13:00) (65 - 131)  BP: 92/56 (29 Aug 2023 13:00) (90/52 - 101/56)  BP(mean): 68 (29 Aug 2023 13:00) (68 - 68)  ABP: --  ABP(mean): --  RR: 18 (29 Aug 2023 13:00) (18 - 26)  SpO2: 94% (29 Aug 2023 13:00) (85% - 94%)    O2 Parameters below as of 29 Aug 2023 13:00  Patient On (Oxygen Delivery Method): nasal cannula  O2 Flow (L/min): 2        I&O's Summary        LABS:                        15.1   19.37 )-----------( 229      ( 29 Aug 2023 17:46 )             48.0     08-29    136  |  103  |  x   ----------------------------<  224<H>  x    |  23  |  0.66    Ca    10.4      29 Aug 2023 00:42  Phos  4.3     08-29  Mg     2.5     08-29    TPro  6.8  /  Alb  4.2  /  TBili  0.4  /  DBili  x   /  AST  13  /  ALT  13  /  AlkPhos  101  08-29    PT/INR - ( 29 Aug 2023 18:04 )   PT: 12.7 sec;   INR: 1.12          PTT - ( 29 Aug 2023 18:04 )  PTT:32.7 sec  Urinalysis Basic - ( 29 Aug 2023 17:46 )    Color: x / Appearance: x / SG: x / pH: x  Gluc: 224 mg/dL / Ketone: x  / Bili: x / Urobili: x   Blood: x / Protein: x / Nitrite: x   Leuk Esterase: x / RBC: x / WBC x   Sq Epi: x / Non Sq Epi: x / Bacteria: x      CAPILLARY BLOOD GLUCOSE      POCT Blood Glucose.: 135 mg/dL (29 Aug 2023 12:13)  POCT Blood Glucose.: 127 mg/dL (29 Aug 2023 08:44)        RADIOLOGY & ADDITIONAL TESTS:    Consultant(s) Notes Reviewed:  [x ] YES  [ ] NO    MEDICATIONS  (STANDING):  albuterol/ipratropium for Nebulization 3 milliLiter(s) Nebulizer every 6 hours  apixaban 5 milliGRAM(s) Oral every 12 hours  atorvastatin 40 milliGRAM(s) Oral at bedtime  chlorhexidine 2% Cloths 1 Application(s) Topical <User Schedule>  dextrose 5%. 1000 milliLiter(s) (50 mL/Hr) IV Continuous <Continuous>  dextrose 5%. 1000 milliLiter(s) (100 mL/Hr) IV Continuous <Continuous>  dextrose 50% Injectable 25 Gram(s) IV Push once  dextrose 50% Injectable 12.5 Gram(s) IV Push once  dextrose 50% Injectable 25 Gram(s) IV Push once  digoxin     Tablet 125 MICROGram(s) Oral daily  escitalopram 20 milliGRAM(s) Oral daily  furosemide   Injectable 40 milliGRAM(s) IV Push once  glucagon  Injectable 1 milliGRAM(s) IntraMuscular once  insulin lispro (ADMELOG) corrective regimen sliding scale   SubCutaneous three times a day before meals  insulin lispro (ADMELOG) corrective regimen sliding scale   SubCutaneous at bedtime  letrozole 2.5 milliGRAM(s) Oral daily  metoprolol succinate  milliGRAM(s) Oral every 24 hours  piperacillin/tazobactam IVPB. 4.5 Gram(s) IV Intermittent once  piperacillin/tazobactam IVPB.- 4.5 Gram(s) IV Intermittent once  sacubitril 24 mG/valsartan 26 mG 1 Tablet(s) Oral every 12 hours  sodium chloride 3%  Inhalation 4 milliLiter(s) Inhalation every 8 hours  spironolactone 25 milliGRAM(s) Oral daily  tiotropium 2.5 MICROgram(s) Inhaler 2 Puff(s) Inhalation daily  traZODone 150 milliGRAM(s) Oral at bedtime    MEDICATIONS  (PRN):  dextrose Oral Gel 15 Gram(s) Oral once PRN Blood Glucose LESS THAN 70 milliGRAM(s)/deciliter      PHYSICAL EXAM:  GENERAL: appears to be in respiratory distress  HEAD:  Atraumatic, Normocephalic  EYES: EOMI, PERRLA, conjunctiva and sclera clear  ENT: Moist mucous membranes  NECK: Supple, No JVD  CHEST/LUNG: on HFNC, use of accessory muscles, expiratory wheezing  HEART: Regular rate and rhythm; No murmurs, rubs, or gallops  ABDOMEN: obese, nontender, no rebound or gaurding  EXTREMITIES:  2+ Peripheral Pulses, brisk capillary refill. No clubbing, cyanosis, or edema  NERVOUS SYSTEM:  Alert & Oriented X3, speech clear. No deficits   MSK: FROM all 4 extremities, full and equal strength  SKIN: No rashes or lesions    Care Discussed with Consultants/Other Providers [ x] YES  [ ] NO

## 2023-08-29 NOTE — PATIENT PROFILE ADULT - FALL HARM RISK - HARM RISK INTERVENTIONS
Assistance with ambulation/Assistance OOB with selected safe patient handling equipment/Communicate Risk of Fall with Harm to all staff/Discuss with provider need for PT consult/Monitor gait and stability/Reinforce activity limits and safety measures with patient and family/Tailored Fall Risk Interventions/Visual Cue: Yellow wristband and red socks/Bed in lowest position, wheels locked, appropriate side rails in place/Call bell, personal items and telephone in reach/Instruct patient to call for assistance before getting out of bed or chair/Non-slip footwear when patient is out of bed/[place] to call system/Physically safe environment - no spills, clutter or unnecessary equipment/Purposeful Proactive Rounding/Room/bathroom lighting operational, light cord in reach

## 2023-08-29 NOTE — H&P ADULT - NSHPPHYSICALEXAM_GEN_ALL_CORE
VITALS: T(C): 37.1 (08-29-23 @ 02:02), Max: 37.8 (08-29-23 @ 00:04)  T(F): 98.7 (08-29-23 @ 02:02), Max: 100.1 (08-29-23 @ 00:04)  HR: 88 (08-29-23 @ 02:02) (88 - 131)  BP: 97/61 (08-29-23 @ 02:02) (90/52 - 97/61)  ABP: --  ABP(mean): --  RR: 18 (08-29-23 @ 02:02) (18 - 26)  SpO2: 94% (08-29-23 @ 02:02) (85% - 94%)    GENERAL: NAD, lying in bed comfortably  HEAD:  Atraumatic, Normocephalic  EYES: EOMI, PERRLA, conjunctiva and sclera clear  ENT: Moist mucous membranes  NECK: Supple, No JVD  CHEST/LUNG: Clear to auscultation bilaterally; No rales, rhonchi, wheezing, or rubs. Unlabored respirations  HEART: Regular rate and rhythm; No murmurs, rubs, or gallops  ABDOMEN: Bowel sounds present; Soft, Nontender, Nondistended. No hepatomegally  EXTREMITIES:  2+ Peripheral Pulses, brisk capillary refill. No clubbing, cyanosis, or edema  NERVOUS SYSTEM:  Alert & Oriented X3, speech clear. No deficits   MSK: FROM all 4 extremities, full and equal strength  SKIN: No rashes or lesions VITALS: T(C): 37.1 (08-29-23 @ 02:02), Max: 37.8 (08-29-23 @ 00:04)  T(F): 98.7 (08-29-23 @ 02:02), Max: 100.1 (08-29-23 @ 00:04)  HR: 88 (08-29-23 @ 02:02) (88 - 131)  BP: 97/61 (08-29-23 @ 02:02) (90/52 - 97/61)  ABP: --  ABP(mean): --  RR: 18 (08-29-23 @ 02:02) (18 - 26)  SpO2: 94% (08-29-23 @ 02:02) (85% - 94%)    GENERAL: NAD, lying in bed comfortably  HEAD:  Atraumatic, Normocephalic  EYES: EOMI, PERRLA, conjunctiva and sclera clear  ENT: Moist mucous membranes  NECK: Supple, No JVD  CHEST/LUNG: no use of accessory muscles, lungs CTA  HEART: Regular rate and rhythm; No murmurs, rubs, or gallops  ABDOMEN: obese, nontender, no rebound or gaurding  EXTREMITIES:  2+ Peripheral Pulses, brisk capillary refill. No clubbing, cyanosis, or edema  NERVOUS SYSTEM:  Alert & Oriented X3, speech clear. No deficits   MSK: FROM all 4 extremities, full and equal strength  SKIN: No rashes or lesions

## 2023-08-29 NOTE — PROVIDER CONTACT NOTE (CHANGE IN STATUS NOTIFICATION) - SITUATION
Pt complaining about shortness of breath, saturation 89% on 2LNC, increased work of breathing and accessory muscle use. Complaining about pain (nonspecific).

## 2023-08-29 NOTE — PATIENT PROFILE ADULT - ARRIVAL FROM
"Alejandrina Rodriguez is a 75 y.o. female patient.    Temp: 98.8 °F (37.1 °C) (06/10/23 1516)  Pulse: 74 (06/10/23 1516)  Resp: 16 (06/10/23 1828)  BP: (!) 166/77 (06/10/23 1516)  SpO2: 95 % (06/10/23 1516)  Weight: 67.1 kg (147 lb 14.9 oz) (06/07/23 0520)  Height: 5' 4" (162.6 cm) (06/04/23 0714)    PICC  Date/Time: 6/10/2023 7:30 PM  Performed by: Alba Evans RN  Consent Done: Yes  Time out: Immediately prior to procedure a time out was called to verify the correct patient, procedure, equipment, support staff and site/side marked as required  Indications: med administration and vascular access  Anesthesia: local infiltration  Local anesthetic: lidocaine 1% without epinephrine  Anesthetic Total (mL): 4  Preparation: skin prepped with ChloraPrep  Skin prep agent dried: skin prep agent completely dried prior to procedure  Sterile barriers: all five maximum sterile barriers used - cap, mask, sterile gown, sterile gloves, and large sterile sheet  Hand hygiene: hand hygiene performed prior to central venous catheter insertion  Location details: left basilic  Catheter type: single lumen  Catheter size: 4 Fr  Catheter Length: 17cm    Ultrasound guidance: yes  Vessel Caliber: medium and patent, compressibility normal  Needle advanced into vessel with real time Ultrasound guidance.  Guidewire confirmed in vessel.  Sterile sheath used.  Number of attempts: 1  Post-procedure: blood return through all ports, sterile dressing applied and chlorhexidine patch    Complications: none  Comments: Arm circ- 35cm      Alba Evans RN  6/10/2023    " Home

## 2023-08-29 NOTE — SWALLOW BEDSIDE ASSESSMENT ADULT - SLP GENERAL OBSERVATIONS
Pt awake and alert on 2L O2 via NC. Daughter at bedside. Patient able to follow commands and respond to open ended questions with intermittent translation assistance from daughter. Patient with baseline wet, productive cough, actively expectorating sputum into collection cup.

## 2023-08-30 LAB
ALBUMIN SERPL ELPH-MCNC: 3.6 G/DL — SIGNIFICANT CHANGE UP (ref 3.3–5)
ALP SERPL-CCNC: 70 U/L — SIGNIFICANT CHANGE UP (ref 40–120)
ALT FLD-CCNC: 9 U/L — LOW (ref 10–45)
ANION GAP SERPL CALC-SCNC: 9 MMOL/L — SIGNIFICANT CHANGE UP (ref 5–17)
AST SERPL-CCNC: 19 U/L — SIGNIFICANT CHANGE UP (ref 10–40)
BASOPHILS # BLD AUTO: 0.06 K/UL — SIGNIFICANT CHANGE UP (ref 0–0.2)
BASOPHILS NFR BLD AUTO: 0.7 % — SIGNIFICANT CHANGE UP (ref 0–2)
BILIRUB SERPL-MCNC: 0.6 MG/DL — SIGNIFICANT CHANGE UP (ref 0.2–1.2)
BUN SERPL-MCNC: 16 MG/DL — SIGNIFICANT CHANGE UP (ref 7–23)
CALCIUM SERPL-MCNC: 9.9 MG/DL — SIGNIFICANT CHANGE UP (ref 8.4–10.5)
CHLORIDE SERPL-SCNC: 103 MMOL/L — SIGNIFICANT CHANGE UP (ref 96–108)
CO2 SERPL-SCNC: 27 MMOL/L — SIGNIFICANT CHANGE UP (ref 22–31)
CREAT SERPL-MCNC: 0.76 MG/DL — SIGNIFICANT CHANGE UP (ref 0.5–1.3)
CULTURE RESULTS: SIGNIFICANT CHANGE UP
EGFR: 81 ML/MIN/1.73M2 — SIGNIFICANT CHANGE UP
EOSINOPHIL # BLD AUTO: 0.13 K/UL — SIGNIFICANT CHANGE UP (ref 0–0.5)
EOSINOPHIL NFR BLD AUTO: 1.6 % — SIGNIFICANT CHANGE UP (ref 0–6)
GLUCOSE BLDC GLUCOMTR-MCNC: 114 MG/DL — HIGH (ref 70–99)
GLUCOSE BLDC GLUCOMTR-MCNC: 133 MG/DL — HIGH (ref 70–99)
GLUCOSE BLDC GLUCOMTR-MCNC: 231 MG/DL — HIGH (ref 70–99)
GLUCOSE SERPL-MCNC: 119 MG/DL — HIGH (ref 70–99)
HCT VFR BLD CALC: 39.2 % — SIGNIFICANT CHANGE UP (ref 34.5–45)
HGB BLD-MCNC: 12.5 G/DL — SIGNIFICANT CHANGE UP (ref 11.5–15.5)
IMM GRANULOCYTES NFR BLD AUTO: 0.6 % — SIGNIFICANT CHANGE UP (ref 0–0.9)
LEGIONELLA AG UR QL: NEGATIVE — SIGNIFICANT CHANGE UP
LYMPHOCYTES # BLD AUTO: 2.24 K/UL — SIGNIFICANT CHANGE UP (ref 1–3.3)
LYMPHOCYTES # BLD AUTO: 27.7 % — SIGNIFICANT CHANGE UP (ref 13–44)
MAGNESIUM SERPL-MCNC: 2.2 MG/DL — SIGNIFICANT CHANGE UP (ref 1.6–2.6)
MCHC RBC-ENTMCNC: 29.2 PG — SIGNIFICANT CHANGE UP (ref 27–34)
MCHC RBC-ENTMCNC: 31.9 GM/DL — LOW (ref 32–36)
MCV RBC AUTO: 91.6 FL — SIGNIFICANT CHANGE UP (ref 80–100)
MONOCYTES # BLD AUTO: 0.89 K/UL — SIGNIFICANT CHANGE UP (ref 0–0.9)
MONOCYTES NFR BLD AUTO: 11 % — SIGNIFICANT CHANGE UP (ref 2–14)
NEUTROPHILS # BLD AUTO: 4.71 K/UL — SIGNIFICANT CHANGE UP (ref 1.8–7.4)
NEUTROPHILS NFR BLD AUTO: 58.4 % — SIGNIFICANT CHANGE UP (ref 43–77)
NRBC # BLD: 0 /100 WBCS — SIGNIFICANT CHANGE UP (ref 0–0)
PHOSPHATE SERPL-MCNC: 3.7 MG/DL — SIGNIFICANT CHANGE UP (ref 2.5–4.5)
PLATELET # BLD AUTO: 165 K/UL — SIGNIFICANT CHANGE UP (ref 150–400)
POTASSIUM SERPL-MCNC: 4 MMOL/L — SIGNIFICANT CHANGE UP (ref 3.5–5.3)
POTASSIUM SERPL-SCNC: 4 MMOL/L — SIGNIFICANT CHANGE UP (ref 3.5–5.3)
PROT SERPL-MCNC: 6.1 G/DL — SIGNIFICANT CHANGE UP (ref 6–8.3)
RBC # BLD: 4.28 M/UL — SIGNIFICANT CHANGE UP (ref 3.8–5.2)
RBC # FLD: 14.7 % — HIGH (ref 10.3–14.5)
S PNEUM AG UR QL: NEGATIVE — SIGNIFICANT CHANGE UP
SODIUM SERPL-SCNC: 139 MMOL/L — SIGNIFICANT CHANGE UP (ref 135–145)
SPECIMEN SOURCE: SIGNIFICANT CHANGE UP
WBC # BLD: 8.08 K/UL — SIGNIFICANT CHANGE UP (ref 3.8–10.5)
WBC # FLD AUTO: 8.08 K/UL — SIGNIFICANT CHANGE UP (ref 3.8–10.5)

## 2023-08-30 PROCEDURE — 99232 SBSQ HOSP IP/OBS MODERATE 35: CPT

## 2023-08-30 PROCEDURE — 99233 SBSQ HOSP IP/OBS HIGH 50: CPT | Mod: GC

## 2023-08-30 PROCEDURE — 93306 TTE W/DOPPLER COMPLETE: CPT | Mod: 26

## 2023-08-30 RX ORDER — POLYETHYLENE GLYCOL 3350 17 G/17G
17 POWDER, FOR SOLUTION ORAL EVERY 12 HOURS
Refills: 0 | Status: DISCONTINUED | OUTPATIENT
Start: 2023-08-30 | End: 2023-09-01

## 2023-08-30 RX ORDER — MIDODRINE HYDROCHLORIDE 2.5 MG/1
5 TABLET ORAL EVERY 8 HOURS
Refills: 0 | Status: DISCONTINUED | OUTPATIENT
Start: 2023-08-30 | End: 2023-09-01

## 2023-08-30 RX ORDER — SPIRONOLACTONE 25 MG/1
25 TABLET, FILM COATED ORAL DAILY
Refills: 0 | Status: DISCONTINUED | OUTPATIENT
Start: 2023-08-30 | End: 2023-09-01

## 2023-08-30 RX ORDER — SPIRONOLACTONE 25 MG/1
25 TABLET, FILM COATED ORAL DAILY
Refills: 0 | Status: DISCONTINUED | OUTPATIENT
Start: 2023-08-30 | End: 2023-08-30

## 2023-08-30 RX ORDER — POLYETHYLENE GLYCOL 3350 17 G/17G
17 POWDER, FOR SOLUTION ORAL DAILY
Refills: 0 | Status: CANCELLED | OUTPATIENT
Start: 2023-08-30 | End: 2023-09-01

## 2023-08-30 RX ADMIN — Medication 3 MILLILITER(S): at 06:12

## 2023-08-30 RX ADMIN — Medication 125 MICROGRAM(S): at 05:59

## 2023-08-30 RX ADMIN — Medication 150 MILLIGRAM(S): at 21:59

## 2023-08-30 RX ADMIN — MIDODRINE HYDROCHLORIDE 5 MILLIGRAM(S): 2.5 TABLET ORAL at 19:33

## 2023-08-30 RX ADMIN — PIPERACILLIN AND TAZOBACTAM 25 GRAM(S): 4; .5 INJECTION, POWDER, LYOPHILIZED, FOR SOLUTION INTRAVENOUS at 21:59

## 2023-08-30 RX ADMIN — Medication 3 MILLILITER(S): at 18:21

## 2023-08-30 RX ADMIN — SODIUM CHLORIDE 4 MILLILITER(S): 9 INJECTION INTRAMUSCULAR; INTRAVENOUS; SUBCUTANEOUS at 06:12

## 2023-08-30 RX ADMIN — PIPERACILLIN AND TAZOBACTAM 25 GRAM(S): 4; .5 INJECTION, POWDER, LYOPHILIZED, FOR SOLUTION INTRAVENOUS at 14:50

## 2023-08-30 RX ADMIN — PIPERACILLIN AND TAZOBACTAM 25 GRAM(S): 4; .5 INJECTION, POWDER, LYOPHILIZED, FOR SOLUTION INTRAVENOUS at 05:59

## 2023-08-30 RX ADMIN — Medication 100 MILLIGRAM(S): at 22:00

## 2023-08-30 RX ADMIN — LETROZOLE 2.5 MILLIGRAM(S): 2.5 TABLET, FILM COATED ORAL at 11:48

## 2023-08-30 RX ADMIN — ATORVASTATIN CALCIUM 40 MILLIGRAM(S): 80 TABLET, FILM COATED ORAL at 21:59

## 2023-08-30 RX ADMIN — SPIRONOLACTONE 25 MILLIGRAM(S): 25 TABLET, FILM COATED ORAL at 05:59

## 2023-08-30 RX ADMIN — SODIUM CHLORIDE 4 MILLILITER(S): 9 INJECTION INTRAMUSCULAR; INTRAVENOUS; SUBCUTANEOUS at 21:59

## 2023-08-30 RX ADMIN — ESCITALOPRAM OXALATE 20 MILLIGRAM(S): 10 TABLET, FILM COATED ORAL at 11:47

## 2023-08-30 RX ADMIN — APIXABAN 5 MILLIGRAM(S): 2.5 TABLET, FILM COATED ORAL at 18:21

## 2023-08-30 RX ADMIN — POLYETHYLENE GLYCOL 3350 17 GRAM(S): 17 POWDER, FOR SOLUTION ORAL at 19:33

## 2023-08-30 RX ADMIN — APIXABAN 5 MILLIGRAM(S): 2.5 TABLET, FILM COATED ORAL at 05:59

## 2023-08-30 RX ADMIN — Medication 3 MILLILITER(S): at 11:47

## 2023-08-30 RX ADMIN — SODIUM CHLORIDE 4 MILLILITER(S): 9 INJECTION INTRAMUSCULAR; INTRAVENOUS; SUBCUTANEOUS at 14:53

## 2023-08-30 RX ADMIN — SACUBITRIL AND VALSARTAN 1 TABLET(S): 24; 26 TABLET, FILM COATED ORAL at 06:00

## 2023-08-30 RX ADMIN — CHLORHEXIDINE GLUCONATE 1 APPLICATION(S): 213 SOLUTION TOPICAL at 06:00

## 2023-08-30 NOTE — PROGRESS NOTE ADULT - SUBJECTIVE AND OBJECTIVE BOX
Cardiology Consult    O/N:  Interval History/HPI: Pt seen and examined at bedside, NAD, no complaints at this time. ROS s/f ?, remainder of ROS otherwise unremarkable   Telemetry:    OBJECTIVE  T(C): 37.3 (08-30-23 @ 06:04), Max: 37.8 (08-29-23 @ 22:12)  HR: 65 (08-30-23 @ 08:00) (60 - 144)  BP: 90/51 (08-30-23 @ 08:00) (82/51 - 133/78)  RR: 21 (08-30-23 @ 08:00) (18 - 44)  SpO2: 96% (08-30-23 @ 08:00) (94% - 100%)    08-29-23 @ 07:01  -  08-30-23 @ 07:00  --------------------------------------------------------  IN: 575 mL / OUT: 3625 mL / NET: -3050 mL    08-30-23 @ 07:01  -  08-30-23 @ 08:36  --------------------------------------------------------  IN: 25 mL / OUT: 70 mL / NET: -45 mL        PHYSICAL EXAM:    Constitutional: resting comfortably in bed; NAD  HEENT: NC/AT, PERRL, EOMI, anicteric sclera, no nasal discharge; uvula midline, no oropharyngeal erythema or exudates; MMM  Neck: supple; no thyromegaly, JVP ? cm H20, JVD +/-  Respiratory: CTA B/L; no W/R/R, no retractions  Cardiac: +S1/S2; RRR; no M/R/G; PMI non-displaced  Gastrointestinal: soft, NT/ND; no rebound or guarding; +BSx4  Extremities: WWP, no clubbing or cyanosis; no peripheral edema  Musculoskeletal: NROM x4; no joint swelling, tenderness or erythema  Vascular: 2+ radial, DP/PT pulses B/L  Dermatologic: skin warm, dry and intact; no rashes, wounds, or scars  Lymphatic: no submandibular or cervical LAD  Neurologic: AAOx3; CNII-XII grossly intact; no focal deficits    LABS:                        12.5   8.08  )-----------( 165      ( 30 Aug 2023 04:51 )             39.2     08-30    139  |  103  |  16  ----------------------------<  119<H>  4.0   |  27  |  0.76    Ca    9.9      30 Aug 2023 04:51  Phos  3.7     08-30  Mg     2.2     08-30    TPro  6.1  /  Alb  3.6  /  TBili  0.6  /  DBili  x   /  AST  19  /  ALT  9<L>  /  AlkPhos  70  08-30    PT/INR - ( 29 Aug 2023 18:04 )   PT: 12.7 sec;   INR: 1.12          PTT - ( 29 Aug 2023 18:04 )  PTT:32.7 sec  Urinalysis Basic - ( 30 Aug 2023 04:51 )    Color: x / Appearance: x / SG: x / pH: x  Gluc: 119 mg/dL / Ketone: x  / Bili: x / Urobili: x   Blood: x / Protein: x / Nitrite: x   Leuk Esterase: x / RBC: x / WBC x   Sq Epi: x / Non Sq Epi: x / Bacteria: x        RADIOLOGY & ADDITIONAL TESTS:  Reviewed .    MEDICATIONS  (STANDING):  albuterol/ipratropium for Nebulization 3 milliLiter(s) Nebulizer every 6 hours  apixaban 5 milliGRAM(s) Oral every 12 hours  atorvastatin 40 milliGRAM(s) Oral at bedtime  chlorhexidine 2% Cloths 1 Application(s) Topical <User Schedule>  dextrose 5%. 1000 milliLiter(s) (50 mL/Hr) IV Continuous <Continuous>  dextrose 5%. 1000 milliLiter(s) (100 mL/Hr) IV Continuous <Continuous>  dextrose 50% Injectable 25 Gram(s) IV Push once  dextrose 50% Injectable 12.5 Gram(s) IV Push once  dextrose 50% Injectable 25 Gram(s) IV Push once  digoxin     Tablet 125 MICROGram(s) Oral daily  escitalopram 20 milliGRAM(s) Oral daily  glucagon  Injectable 1 milliGRAM(s) IntraMuscular once  insulin lispro (ADMELOG) corrective regimen sliding scale   SubCutaneous at bedtime  letrozole 2.5 milliGRAM(s) Oral daily  metoprolol succinate  milliGRAM(s) Oral every 24 hours  piperacillin/tazobactam IVPB.. 4.5 Gram(s) IV Intermittent every 8 hours  sodium chloride 3%  Inhalation 4 milliLiter(s) Inhalation every 8 hours  spironolactone 25 milliGRAM(s) Oral daily  traZODone 150 milliGRAM(s) Oral at bedtime    MEDICATIONS  (PRN):  dextrose Oral Gel 15 Gram(s) Oral once PRN Blood Glucose LESS THAN 70 milliGRAM(s)/deciliter     Cardiology Consult    O/N: RICHARD  Interval History/HPI: Pt seen and examined at bedside. She states her SOB has improved. Denies any CP, palpitations, or dizziness.   Telemetry: V-paced, 1* AVB, with intermittent episodes of sinus tachycardia to 130s.    OBJECTIVE  T(C): 37.3 (08-30-23 @ 06:04), Max: 37.8 (08-29-23 @ 22:12)  HR: 65 (08-30-23 @ 08:00) (60 - 144)  BP: 90/51 (08-30-23 @ 08:00) (82/51 - 133/78)  RR: 21 (08-30-23 @ 08:00) (18 - 44)  SpO2: 96% (08-30-23 @ 08:00) (94% - 100%)    08-29-23 @ 07:01  -  08-30-23 @ 07:00  --------------------------------------------------------  IN: 575 mL / OUT: 3625 mL / NET: -3050 mL    08-30-23 @ 07:01  -  08-30-23 @ 08:36  --------------------------------------------------------  IN: 25 mL / OUT: 70 mL / NET: -45 mL        PHYSICAL EXAM:    Constitutional: resting comfortably in bed; NAD  HEENT: NC/AT, PERRL, EOMI, anicteric sclera, no nasal discharge; +HFNC  Neck: supple; no thyromegaly, JVP 10 cm H20, JVD +, +HGR, crane a waves  Respiratory: bibasilar crackles; no W/R/R, no retractions  Cardiac: +S1/S2; RRR; 2/6 ANGELINA at LSB, PMI non-displaced  Gastrointestinal: soft, NT/ND; no rebound or guarding; +BSx4  Extremities: WWP, no clubbing or cyanosis; no peripheral edema  Musculoskeletal: NROM x4; no joint swelling, tenderness or erythema  Vascular: 2+ radial, DP/PT pulses B/L      LABS:                        12.5   8.08  )-----------( 165      ( 30 Aug 2023 04:51 )             39.2     08-30    139  |  103  |  16  ----------------------------<  119<H>  4.0   |  27  |  0.76    Ca    9.9      30 Aug 2023 04:51  Phos  3.7     08-30  Mg     2.2     08-30    TPro  6.1  /  Alb  3.6  /  TBili  0.6  /  DBili  x   /  AST  19  /  ALT  9<L>  /  AlkPhos  70  08-30    PT/INR - ( 29 Aug 2023 18:04 )   PT: 12.7 sec;   INR: 1.12          PTT - ( 29 Aug 2023 18:04 )  PTT:32.7 sec  Urinalysis Basic - ( 30 Aug 2023 04:51 )    Color: x / Appearance: x / SG: x / pH: x  Gluc: 119 mg/dL / Ketone: x  / Bili: x / Urobili: x   Blood: x / Protein: x / Nitrite: x   Leuk Esterase: x / RBC: x / WBC x   Sq Epi: x / Non Sq Epi: x / Bacteria: x        RADIOLOGY & ADDITIONAL TESTS:  Reviewed .    MEDICATIONS  (STANDING):  albuterol/ipratropium for Nebulization 3 milliLiter(s) Nebulizer every 6 hours  apixaban 5 milliGRAM(s) Oral every 12 hours  atorvastatin 40 milliGRAM(s) Oral at bedtime  chlorhexidine 2% Cloths 1 Application(s) Topical <User Schedule>  dextrose 5%. 1000 milliLiter(s) (50 mL/Hr) IV Continuous <Continuous>  dextrose 5%. 1000 milliLiter(s) (100 mL/Hr) IV Continuous <Continuous>  dextrose 50% Injectable 25 Gram(s) IV Push once  dextrose 50% Injectable 12.5 Gram(s) IV Push once  dextrose 50% Injectable 25 Gram(s) IV Push once  digoxin     Tablet 125 MICROGram(s) Oral daily  escitalopram 20 milliGRAM(s) Oral daily  glucagon  Injectable 1 milliGRAM(s) IntraMuscular once  insulin lispro (ADMELOG) corrective regimen sliding scale   SubCutaneous at bedtime  letrozole 2.5 milliGRAM(s) Oral daily  metoprolol succinate  milliGRAM(s) Oral every 24 hours  piperacillin/tazobactam IVPB.. 4.5 Gram(s) IV Intermittent every 8 hours  sodium chloride 3%  Inhalation 4 milliLiter(s) Inhalation every 8 hours  spironolactone 25 milliGRAM(s) Oral daily  traZODone 150 milliGRAM(s) Oral at bedtime    MEDICATIONS  (PRN):  dextrose Oral Gel 15 Gram(s) Oral once PRN Blood Glucose LESS THAN 70 milliGRAM(s)/deciliter

## 2023-08-30 NOTE — PROGRESS NOTE ADULT - ASSESSMENT
76 yo F w/ afib on eliquis, HTN, NIDDM type 2, HFrEF (EF 30-35%), BiV AICD (medtronic, 2011), R sided breast ca in the past, bronchiectasis (followed by Dr Hart), CRISTIN on CPAP at night, presenting with hypoxia. Pt was in usual state of health yesterday, per family and pt  today at 8pm was eating and began coughing and was febrile to 101 and dyspneic with hypoxia to 84% on RA which improved w/ NC, they did home labs w/ WBC 20 and lactate 2.2 so brought her to ED. Pt was recently admitted at [facility] 2 months ago for sepsis 2/2 aspiration pneumonia where she was admitted to the ICU requiring pressors. Admitted for AHRF and severe sepsis likely 2/2 to bronchiectasis exacerbation vs aspiration pneumonitis. RRT called 8/29 for acute worsening of respiratory status in a setting of fluid overload. Cardiology consulted for volume optimization.     Review of studies:  EKG: Sinus tachycardia, 1st degree AV block, atypical LBBB  TTE (6/2022): EF 20-25% with global hypokinesis, dilated LA, moderate MR, PASP 36  TTE (7/2022): mod-sev reduced LVSF, EF 30-35%, septal wall thickness, mildly dilated RV and reduced RVSF, biatrial enlargement, mild-mod AR/TR, mod MR, pulm HTN PASP 42, borderline dilated aortic root, similar compared to prior.    #AHRF 2/2 aspiration pneumonia and flash pulmonary edema w/ CHF and moderate MR  Patient is fluid overloaded on physical exam, +JVD and diffuse crackles, warm, well perfused. Now on HFNC.  - recommend Lasix 40 mg IV qd today, can switch to 40 mg PO tomorrow  - continue home medications: spironolactone 25mg, Toprol XL 100mg   - hold Entresto iso hypotension and will slowly introduce as tolerated     #chronic afib  - [id number]   - on Eliquis at home  - continue with anti-coagulation  - continue BB as above and digoxin

## 2023-08-30 NOTE — PROGRESS NOTE ADULT - SUBJECTIVE AND OBJECTIVE BOX
*** ACCEPTING TRANSFER FROM MICU TO [facility] ***    76 yo F w/ afib on eliquis and Digoxin, HTN, DM, s/p PPM, R sided breast ca in the past, bronchiectasis (followed by Dr Hart), CRISTIN on CPAP at night, HFrEF, who presented with acute hypoxia and increased shortness of breath. Patient presented on 8/28 for increased cough and worsening shortness of breath with hypoxia to 85% on RA at home. Per family, patient has had increased cough over the past few days and was evaluated outpatient and prescribed a 7 day course of cefdinir and acetylcysteine inhalation. On arrival to the ED, patient was febrile, tachycardic and 85% on RA. Pt was started on vanc and zosyn for presumed aspiration pneumonia and was admitted to [facility]. Of note, patient was recently admitted at [facility] 2 months ago for sepsis 2/2 aspiration pneumonia where she was admitted to the ICU requiring pressors. Rapid response was called on 8/29 for episode of acute shortness of breath/tachypnea with hypoxia to mid 80s on 4L NC. Patient was hypertensive to 150-160s systolic and HR was 150. Crackles were heard on exam and patient received Lasix 40mg IVPx1. Ferrer catheter was placed for urinary retention and patient was placed on HFNC. Decision was made to transfer the patient to MICU, and subsequently stepped down to [facility] for stable respiratory status.    PHYSICAL EXAM: Noted in "Physical Exam" section below.    VITAL SIGNS:  Vital Signs Last 24 Hrs  T(C): 36.4 (30 Aug 2023 16:55), Max: 37.9 (30 Aug 2023 14:27)  T(F): 97.5 (30 Aug 2023 16:55), Max: 100.2 (30 Aug 2023 14:27)  HR: 62 (30 Aug 2023 20:28) (59 - 88)  BP: 95/52 (30 Aug 2023 20:24) (75/42 - 102/51)  BP(mean): 68 (30 Aug 2023 20:24) (55 - 74)  RR: 20 (30 Aug 2023 20:24) (20 - 40)  SpO2: 94% (30 Aug 2023 20:28) (94% - 100%)    Parameters below as of 30 Aug 2023 20:24  Patient On (Oxygen Delivery Method): nasal cannula  O2 Flow (L/min): 6        08-29-23 @ 07:01  -  08-30-23 @ 07:00  --------------------------------------------------------  IN: 575 mL / OUT: 3625 mL / NET: -3050 mL    08-30-23 @ 07:01  - 08-30-23 @ 21:10  --------------------------------------------------------  IN: 50 mL / OUT: 470 mL / NET: -420 mL        MEDICATIONS:  MEDICATIONS  (STANDING):  albuterol/ipratropium for Nebulization 3 milliLiter(s) Nebulizer every 6 hours  apixaban 5 milliGRAM(s) Oral every 12 hours  atorvastatin 40 milliGRAM(s) Oral at bedtime  bisacodyl Suppository 10 milliGRAM(s) Rectal once  chlorhexidine 2% Cloths 1 Application(s) Topical <User Schedule>  dextrose 5%. 1000 milliLiter(s) (50 mL/Hr) IV Continuous <Continuous>  dextrose 5%. 1000 milliLiter(s) (100 mL/Hr) IV Continuous <Continuous>  dextrose 50% Injectable 25 Gram(s) IV Push once  dextrose 50% Injectable 12.5 Gram(s) IV Push once  dextrose 50% Injectable 25 Gram(s) IV Push once  digoxin     Tablet 125 MICROGram(s) Oral daily  escitalopram 20 milliGRAM(s) Oral daily  glucagon  Injectable 1 milliGRAM(s) IntraMuscular once  insulin lispro (ADMELOG) corrective regimen sliding scale   SubCutaneous Before meals and at bedtime  letrozole 2.5 milliGRAM(s) Oral daily  metoprolol succinate  milliGRAM(s) Oral every 24 hours  midodrine 5 milliGRAM(s) Oral every 8 hours  piperacillin/tazobactam IVPB.. 4.5 Gram(s) IV Intermittent every 8 hours  polyethylene glycol 3350 17 Gram(s) Oral every 12 hours  sodium chloride 3%  Inhalation 4 milliLiter(s) Inhalation every 8 hours  spironolactone 25 milliGRAM(s) Oral daily  traZODone 150 milliGRAM(s) Oral at bedtime    MEDICATIONS  (PRN):  dextrose Oral Gel 15 Gram(s) Oral once PRN Blood Glucose LESS THAN 70 milliGRAM(s)/deciliter      ALLERGIES:  Allergies    No Known Allergies    Intolerances        LABS:                        12.5   8.08  )-----------( 165      ( 30 Aug 2023 04:51 )             39.2     08-30    139  |  103  |  16  ----------------------------<  119<H>  4.0   |  27  |  0.76    Ca    9.9      30 Aug 2023 04:51  Phos  3.7     08-30  Mg     2.2     08-30    TPro  6.1  /  Alb  3.6  /  TBili  0.6  /  DBili  x   /  AST  19  /  ALT  9<L>  /  AlkPhos  70  08-30    PT/INR - ( 29 Aug 2023 18:04 )   PT: 12.7 sec;   INR: 1.12          PTT - ( 29 Aug 2023 18:04 )  PTT:32.7 sec  Urinalysis Basic - ( 30 Aug 2023 04:51 )    Color: x / Appearance: x / SG: x / pH: x  Gluc: 119 mg/dL / Ketone: x  / Bili: x / Urobili: x   Blood: x / Protein: x / Nitrite: x   Leuk Esterase: x / RBC: x / WBC x   Sq Epi: x / Non Sq Epi: x / Bacteria: x      CAPILLARY BLOOD GLUCOSE      POCT Blood Glucose.: 231 mg/dL (30 Aug 2023 15:44)      RADIOLOGY & ADDITIONAL TESTS: Reviewed.

## 2023-08-30 NOTE — PROGRESS NOTE ADULT - ASSESSMENT
76 yo F w/ afib on eliquis, HTN, DM, PPM, R sided breast ca in the past, bronchiectasis (followed by Dr Hart), CRISTIN on CPAP at night, CHF, presenting with hypoxia at home following aspiration event, found to have AHRF and severe sepsis likely 2/2 to aspiration pneumonitis. Patient transferred to MICU for closer airway monitoring and chest PT. Exam improving on HFNC and Zosyn.    NEURO:  #Anxiety and depression.   Home meds: trazadone 150mg qhs and escitalopram 20mg qd  Plan:  - cont home meds.    CV:  #Chronic atrial fibrillation  Plan: Home meds: digoxin .152mcg qd, toprol XL 100mg qd and eliquis 5mg BID  - dig level WNL  - Cont digoxin   - Cont eliquis 5mg BID    #Chronic heart failure.   Plan: Home meds: spironolactone 25mg, Toprol XL 100mg, entresto 24/26 and 49/51  - Continue home meds    #HLD  Plan: Home meds: atorvastatin 40mg qhs  - Cont home meds.    #CRISTIN on CPAP.   Plan:   - Cont CRISTIN o/n.    PULM:  #Aspiration pneumonitis.   Pt presenting following choking event, noted to be hypoxic at home. Recent hospitilization at [facility] for aspiration PNA, requiring pressors in ICU. Pt has hx of tracheobronchomalacia, barium swallow 8/2 w/ findings of calcifications on laryngeal cartilage. Treating with Zosyn iso severe sepsis   - mildly thick liquids per S&S  - Aspiration precautions.  - See sepsis plan in ID below    #Acute respiratory failure with hypoxia.   Plan: Pt reportedly hypoxic to 84% prior to presentation, on intermittent supplemental O2 at home. Witnessed aspiration event likely precipitating hypoxia  - currently w HFNC  - Maintain aspiration precautions  - cont duoneb q6  - cont hypersaline inhalation    RENAL:  KELLEY    GI:  KELLEY    ENDO:  #DM2  Home meds: Jardiance 10mg qd  - ISS  - Consistent Carb diet.    METABOLIC:  KELLEY    HEMATOLOGIC:  #History of breast cancer.   Plan: Home meds: letrozole 2.5 mg qd  - Cont home meds.    ID:  #Severe sepsis.   Pt presenting to the ED with hypoxia, tachycardia, tachynea and hypoxia with leukocytosis to 20 and lactate to 2.5 now cleared, following aspiration event evening of 8/28. Pt seen o/p, 8/28 to f/u for recent persistent cough. Source bronchiectasias exacerbation vs aspiration pneumonitis, CXR w/o consolidation or effusions, however remains hypoxic.    - Cont Zosyn 4.5g IV q8hrs for 7 days  - F/U BCx and sputum Cx.    SKIN:  KELLEY    #Prophylactic measure.   F: s/p LR 1.6L  E: replete: K<4, Mg<2  N: MILDTHICKLIMARIA D Wells  D: Eliquis 5mg BID    Dispo: F  Code:Full.     76 yo F w/ afib on eliquis, HTN, DM, PPM, R sided breast ca in the past, bronchiectasis (followed by Dr Hart), CRISTIN on CPAP at night, CHF, presenting with hypoxia at home following aspiration event, found to have AHRF and severe sepsis likely 2/2 to aspiration pneumonitis. Patient transferred to MICU for closer airway monitoring and chest PT. Exam improving on HFNC and Zosyn.    NEURO:  #Anxiety and depression.   Home meds: trazadone 150mg qhs and escitalopram 20mg qd  Plan:  - cont home meds.    CV:  #Chronic atrial fibrillation  Plan: Home meds: digoxin .152mcg qd, toprol XL 100mg qd and eliquis 5mg BID  - dig level WNL  - Cont digoxin   - Cont eliquis 5mg BID    #Chronic heart failure.   Plan: Home meds: spironolactone 25mg, Toprol XL 100mg, entresto 24/26 and 49/51  - Continue home meds with holding parameters    #HLD  Plan: Home meds: atorvastatin 40mg qhs  - Cont home meds.    #CRISTIN on CPAP.   Plan:   - Cont CRISTIN o/n.    PULM:  #Aspiration pneumonitis.   Pt presenting following choking event, noted to be hypoxic at home. Recent hospitilization at [facility] for aspiration PNA, requiring pressors in ICU. Pt has hx of tracheobronchomalacia, barium swallow 8/2 w/ findings of calcifications on laryngeal cartilage. Treating with Zosyn iso severe sepsis  - mildly thick liquids per S&S  - Aspiration precautions.  - See sepsis plan in ID below    #Acute respiratory failure with hypoxia.   Plan: Pt reportedly hypoxic to 84% prior to presentation, on intermittent supplemental O2 at home. Witnessed aspiration event likely precipitating hypoxia  - currently w HFNC, plan to wean today  - Maintain aspiration precautions  - cont duoneb q6  - cont hypersaline inhalation  - encourage IS    RENAL:  KELLEY    GI:  KELLEY    ENDO:  #DM2  Home meds: Jardiance 10mg qd  - ISS  - Consistent Carb diet.    METABOLIC:  KELLEY    HEMATOLOGIC:  #History of breast cancer.   Plan: Home meds: letrozole 2.5 mg qd  - Cont home meds.    ID:  #Severe sepsis.   Pt presenting to the ED with hypoxia, tachycardia, tachypnea and hypoxia with leukocytosis to 20 and lactate to 2.5 now cleared, following aspiration event evening of 8/28. Pt seen o/p, 8/28 to f/u for recent persistent cough. Source bronchiectasias exacerbation vs aspiration pneumonitis, CXR w/o consolidation or effusions, however remains hypoxic. Sepsis now resolved.   - Cont Zosyn 4.5g IV q8hrs for 7 days  - F/U BCx (NGTD) and sputum Cx (moderate WBCs, moderate GP rods, rare yeast).    SKIN:  KELLEY    #Prophylactic measure.   F: s/p LR 1.6L  E: replete: K<4, Mg<2  N: MILDTHICKLIQS  Priscilla  D: Eliquis 5mg BID    Dispo: [facility]  Code:Full.     78 yo F w/ afib on eliquis, HTN, DM, PPM, R sided breast ca in the past, bronchiectasis (followed by Dr Hart), CRISTIN on CPAP at night, CHF, presenting with hypoxia at home following aspiration event, found to have AHRF and severe sepsis likely 2/2 to aspiration pneumonitis. Patient transferred to MICU for closer airway monitoring and chest PT. Exam improving on HFNC and Zosyn.    NEURO:  #Anxiety and depression.   Home meds: trazadone 150mg qhs and escitalopram 20mg qd  Plan:  - cont home meds.    CV:  #Chronic atrial fibrillation  Plan: Home meds: digoxin .152mcg qd, toprol XL 100mg qd and eliquis 5mg BID  - dig level WNL  - Cont digoxin   - Cont eliquis 5mg BID    #Chronic heart failure.   Plan: Home meds: spironolactone 25mg, Toprol XL 100mg, entresto 24/26 and 49/51  - Continue home meds with holding parameters    #HLD  Plan: Home meds: atorvastatin 40mg qhs  - Cont home meds.    #CRISTIN on CPAP.   Plan:   - Cont CIRSTIN o/n.    PULM:  #Aspiration pneumonitis.   Pt presenting following choking event, noted to be hypoxic at home. Recent hospitilization at [facility] for aspiration PNA, requiring pressors in ICU. Pt has hx of tracheobronchomalacia, barium swallow 8/2 w/ findings of calcifications on laryngeal cartilage. Treating with Zosyn iso severe sepsis  - mildly thick liquids per S&S  - Aspiration precautions.  - See sepsis plan in ID below    #Acute respiratory failure with hypoxia.   Plan: Pt reportedly hypoxic to 84% prior to presentation, on intermittent supplemental O2 at home. Witnessed aspiration event likely precipitating hypoxia  - currently w HFNC, plan to wean today  - Maintain aspiration precautions  - cont duoneb q6  - cont hypersaline inhalation  - encourage IS    RENAL:  KELLEY    GI:  #Constipation  Plan: reports has not had a BM since arrival to hospital. Takes Miralax BID at home  - Start Miralax 17g qday    ENDO:  #DM2  Home meds: Jardiance 10mg qd  - ISS  - Consistent Carb diet.    METABOLIC:  KELLEY    HEMATOLOGIC:  #History of breast cancer.   Plan: Home meds: letrozole 2.5 mg qd  - Cont home meds.    ID:  #Severe sepsis.   Pt presenting to the ED with hypoxia, tachycardia, tachypnea and hypoxia with leukocytosis to 20 and lactate to 2.5 now cleared, following aspiration event evening of 8/28. Pt seen o/p, 8/28 to f/u for recent persistent cough. Source bronchiectasias exacerbation vs aspiration pneumonitis, CXR w/o consolidation or effusions, however remains hypoxic. Sepsis now resolved.   - Cont Zosyn 4.5g IV q8hrs for 7 days  - F/U BCx (NGTD) and sputum Cx (moderate WBCs, moderate GP rods, rare yeast).    SKIN:  KELLEY    #Prophylactic measure.   F: s/p LR 1.6L  E: replete: K<4, Mg<2  N: MILDTHICKLIQS  Priscilla  D: Eliquis 5mg BID    Dispo: [facility]  Code:Full.     78 yo F w/ afib on eliquis, HTN, DM, PPM, R sided breast ca in the past, bronchiectasis (followed by Dr Hart), CRISTIN on CPAP at night, CHF, presenting with hypoxia at home following aspiration event, found to have AHRF and severe sepsis likely 2/2 to aspiration pneumonitis. Patient transferred to MICU for closer airway monitoring and chest PT. Exam improving on HFNC and Zosyn.    NEURO:  #Anxiety and depression.   Home meds: trazadone 150mg qhs and escitalopram 20mg qd  Plan:  - cont home meds.    CV:  #Chronic atrial fibrillation  Plan: Home meds: digoxin .152mcg qd, toprol XL 100mg qd and eliquis 5mg BID  - dig level WNL  - Cont digoxin   - Cont eliquis 5mg BID    #Chronic heart failure.   Plan: Home meds: spironolactone 25mg, Toprol XL 100mg, entresto 24/26 and 49/51  - Continue home meds with holding parameters    #HLD  Plan: Home meds: atorvastatin 40mg qhs  - Cont home meds.    #CRISTIN on CPAP.   Plan:   - Cont CRISTIN o/n.    PULM:  #Aspiration pneumonitis.   Pt presenting following choking event, noted to be hypoxic at home. Recent hospitilization at [facility] for aspiration PNA, requiring pressors in ICU. Pt has hx of tracheobronchomalacia, barium swallow 8/2 w/ findings of calcifications on laryngeal cartilage. Treating with Zosyn iso severe sepsis  - mildly thick liquids per S&S  - Aspiration precautions.  - See sepsis plan in ID below    #Acute respiratory failure with hypoxia.   Plan: Pt reportedly hypoxic to 84% prior to presentation, on intermittent supplemental O2 at home. Witnessed aspiration event likely precipitating hypoxia  - currently w HFNC, plan to wean today  - Maintain aspiration precautions  - cont duoneb q6  - cont hypersaline inhalation  - encourage IS    RENAL:  KELLEY    GI:  #Constipation  Plan: reports has not had a BM since arrival to hospital. Takes Miralax BID at home  - Start Miralax 17g qday    ENDO:  #DM2  Home meds: Jardiance 10mg qd  - ISS  - Consistent Carb diet.    METABOLIC:  KELLEY    HEMATOLOGIC:  #History of breast cancer.   Plan: Home meds: letrozole 2.5 mg qd  - Cont home meds.    ID:  #Severe sepsis.   Pt presenting to the ED with hypoxia, tachycardia, tachypnea and hypoxia with leukocytosis to 20 and lactate to 2.5 now cleared, following aspiration event evening of 8/28. Pt seen o/p, 8/28 to f/u for recent persistent cough. Source bronchiectasias exacerbation vs aspiration pneumonitis, CXR w/o consolidation or effusions, however remains hypoxic. Sepsis now resolved.   - Cont Zosyn 4.5g IV q8hrs for 7 days  - F/U BCx (NGTD) and sputum Cx (moderate WBCs, moderate GP rods, rare yeast).    SKIN:  KELLEY    #Prophylactic measure.   F: s/p LR 1.6L  E: replete: K<4, Mg<2  N: MILDTHICKLIQS  Priscilla   D: Eliquis 5mg BID    Dispo: [facility]  Code:Full.     78 yo F w/ afib on eliquis, HTN, DM, PPM, R sided breast ca in the past, bronchiectasis (followed by Dr Hart), CRISTIN on CPAP at night, CHF, presenting with hypoxia at home following aspiration event, found to have AHRF and severe sepsis likely 2/2 to aspiration pneumonitis. Patient transferred to MICU for closer airway monitoring and chest PT. Exam improving on HFNC and Zosyn.    NEURO:  #Anxiety and depression.   Home meds: trazadone 150mg qhs and escitalopram 20mg qd  Plan:  - cont home meds.    CV:  #Chronic atrial fibrillation  Plan: Home meds: digoxin .152mcg qd, toprol XL 100mg qd and eliquis 5mg BID  - dig level WNL  - Cont digoxin   - Cont eliquis 5mg BID    #Chronic heart failure.   Plan: Home meds: spironolactone 25mg, Toprol XL 100mg, entresto 24/26 and 49/51  - Continue spironolactone and toprol w/ holding parameters  - hold home Entresto iso sepsis; consider restarting when no longer septic    #HLD  Plan: Home meds: atorvastatin 40mg qhs  - Cont home meds.    #CRISTIN on CPAP.   Plan:   - Cont CRISTIN o/n.    PULM:  #Aspiration pneumonitis.   Pt presenting following choking event, noted to be hypoxic at home. Recent hospitilization at [facility] for aspiration PNA, requiring pressors in ICU. Pt has hx of tracheobronchomalacia, barium swallow 8/2 w/ findings of calcifications on laryngeal cartilage. Treating with Zosyn iso severe sepsis  - mildly thick liquids per S&S  - Aspiration precautions.  - See sepsis plan in ID below    #Acute respiratory failure with hypoxia.   Plan: Pt reportedly hypoxic to 84% prior to presentation, on intermittent supplemental O2 at home. Witnessed aspiration event likely precipitating hypoxia  - currently w HFNC, plan to wean today  - Maintain aspiration precautions  - cont duoneb q6  - encourage IS    RENAL:  KELLEY    GI:  #Constipation  Plan: reports has not had a BM since arrival to hospital. Takes Miralax BID at home  - Start Miralax 17g qday    ENDO:  #DM2  Home meds: Jardiance 10mg qd  - ISS  - Consistent Carb diet.    METABOLIC:  KELLEY    HEMATOLOGIC:  #History of breast cancer.   Plan: Home meds: letrozole 2.5 mg qd  - Cont home meds.    ID:  #Severe sepsis.   Pt presenting to the ED with hypoxia, tachycardia, tachypnea and hypoxia with leukocytosis to 20 and lactate to 2.5 now cleared, following aspiration event evening of 8/28. Pt seen o/p, 8/28 to f/u for recent persistent cough. Source bronchiectasias exacerbation vs aspiration pneumonitis, CXR w/o consolidation or effusions, however remains hypoxic. Sepsis now resolved.   - Cont Zosyn 4.5g IV q8hrs for 7 days  - F/U BCx (NGTD) and sputum Cx (moderate WBCs, moderate GP rods, rare yeast).    SKIN:  KELLEY    #Prophylactic measure.   F: s/p LR 1.6L  E: replete: K<4, Mg<2  N: MILDTHICKLIQS  Priscilla   D: Eliquis 5mg BID    Dispo: [facility]  Code:Full.

## 2023-08-30 NOTE — PHYSICAL THERAPY INITIAL EVALUATION ADULT - ADDITIONAL COMMENTS
Pt. has an elevator access, she uses RW for shorter distances ambulation and WC for appointments; pt has 24/7 [facility] assist.

## 2023-08-30 NOTE — PROGRESS NOTE ADULT - SUBJECTIVE AND OBJECTIVE BOX
INTERVAL HPI/OVERNIGHT EVENTS: Pt transferred to MICU from [facility] after AHRF episode. MRSA negative. UOP slowing, gave Lasix 40mg IVPx1. Ferrer catheter placed for urinary retention     SUBJECTIVE: Patient seen and examined at bedside.     ROS: All negative except as listed above.    VITAL SIGNS:  ICU Vital Signs Last 24 Hrs  T(C): 37.3 (30 Aug 2023 06:04), Max: 37.8 (29 Aug 2023 22:12)  T(F): 99.1 (30 Aug 2023 06:04), Max: 100 (29 Aug 2023 22:12)  HR: 68 (30 Aug 2023 07:20) (60 - 144)  BP: 93/49 (30 Aug 2023 07:20) (82/51 - 133/78)  BP(mean): 69 (30 Aug 2023 07:20) (62 - 101)  ABP: --  ABP(mean): --  RR: 37 (30 Aug 2023 07:20) (18 - 44)  SpO2: 96% (30 Aug 2023 07:20) (94% - 100%)    O2 Parameters below as of 30 Aug 2023 07:20  Patient On (Oxygen Delivery Method): nasal cannula, high flow  O2 Flow (L/min): 50  O2 Concentration (%): 60        Plateau pressure:   P/F ratio:     08-29 @ 07:01  -  08-30 @ 07:00  --------------------------------------------------------  IN: 575 mL / OUT: 3625 mL / NET: -3050 mL    08-30 @ 07:01 - 08-30 @ 07:38  --------------------------------------------------------  IN: 25 mL / OUT: 0 mL / NET: 25 mL      CAPILLARY BLOOD GLUCOSE      POCT Blood Glucose.: 148 mg/dL (29 Aug 2023 22:07)      ECG: reviewed.    PHYSICAL EXAM:    GENERAL: lying comfortably in bed on HFNC, in no acute distress  HEAD:  Atraumatic, Normocephalic  EYES: EOMI, conjunctiva and sclera clear  ENT: Moist mucous membranes  NECK: Supple, No JVD  CHEST/LUNG: on HFNC, no use of accessory muscles, mild crackles bilateral lower lung fields   HEART: Regular rate and rhythm; No murmurs, rubs, or gallops  ABDOMEN: obese, nontender, no rebound or guarding, normoactive bowel sounds x4   EXTREMITIES:  2+ Peripheral Pulses, brisk capillary refill. No clubbing, cyanosis, or edema  NERVOUS SYSTEM:  Alert & Oriented X3, speech clear. No deficits   MSK: FROM all 4 extremities, full and equal strength  SKIN: No rashes or lesions    MEDICATIONS:  MEDICATIONS  (STANDING):  albuterol/ipratropium for Nebulization 3 milliLiter(s) Nebulizer every 6 hours  apixaban 5 milliGRAM(s) Oral every 12 hours  atorvastatin 40 milliGRAM(s) Oral at bedtime  chlorhexidine 2% Cloths 1 Application(s) Topical <User Schedule>  dextrose 5%. 1000 milliLiter(s) (50 mL/Hr) IV Continuous <Continuous>  dextrose 5%. 1000 milliLiter(s) (100 mL/Hr) IV Continuous <Continuous>  dextrose 50% Injectable 25 Gram(s) IV Push once  dextrose 50% Injectable 12.5 Gram(s) IV Push once  dextrose 50% Injectable 25 Gram(s) IV Push once  digoxin     Tablet 125 MICROGram(s) Oral daily  escitalopram 20 milliGRAM(s) Oral daily  glucagon  Injectable 1 milliGRAM(s) IntraMuscular once  insulin lispro (ADMELOG) corrective regimen sliding scale   SubCutaneous at bedtime  letrozole 2.5 milliGRAM(s) Oral daily  metoprolol succinate  milliGRAM(s) Oral every 24 hours  piperacillin/tazobactam IVPB.. 4.5 Gram(s) IV Intermittent every 8 hours  sodium chloride 3%  Inhalation 4 milliLiter(s) Inhalation every 8 hours  spironolactone 25 milliGRAM(s) Oral daily  traZODone 150 milliGRAM(s) Oral at bedtime    MEDICATIONS  (PRN):  dextrose Oral Gel 15 Gram(s) Oral once PRN Blood Glucose LESS THAN 70 milliGRAM(s)/deciliter      ALLERGIES:  Allergies    No Known Allergies    Intolerances      LABS:                        12.5   8.08  )-----------( 165      ( 30 Aug 2023 04:51 )             39.2     08-30    139  |  103  |  16  ----------------------------<  119<H>  4.0   |  27  |  0.76    Ca    9.9      30 Aug 2023 04:51  Phos  3.7     08-30  Mg     2.2     08-30    TPro  6.1  /  Alb  3.6  /  TBili  0.6  /  DBili  x   /  AST  19  /  ALT  9<L>  /  AlkPhos  70  08-30    PT/INR - ( 29 Aug 2023 18:04 )   PT: 12.7 sec;   INR: 1.12          PTT - ( 29 Aug 2023 18:04 )  PTT:32.7 sec  Urinalysis Basic - ( 30 Aug 2023 04:51 )    Color: x / Appearance: x / SG: x / pH: x  Gluc: 119 mg/dL / Ketone: x  / Bili: x / Urobili: x   Blood: x / Protein: x / Nitrite: x   Leuk Esterase: x / RBC: x / WBC x   Sq Epi: x / Non Sq Epi: x / Bacteria: x      ABG:      vBG:    Micro:    Culture - Blood (collected 08-29-23 @ 02:07)  Source: .Blood Blood-Peripheral  Preliminary Report (08-30-23 @ 04:00):    No growth at 1 day.    Culture - Blood (collected 08-29-23 @ 02:07)  Source: .Blood Blood-Peripheral  Preliminary Report (08-30-23 @ 04:00):    No growth at 1 day.       Urinalysis with Rflx Culture (collected 08-29-23 @ 18:32)       Culture - Sputum (collected 08-29-23 @ 16:27)  Source: .Sputum Sputum  Gram Stain (08-29-23 @ 19:18):    Few epithelial cells    Moderate WBC's    Moderate Gram Positive Rods    Rare Gram positive cocci in pairs    Rare Yeast        RADIOLOGY & ADDITIONAL TESTS: Reviewed.         INTERVAL HPI/OVERNIGHT EVENTS: Pt transferred to MICU from [facility] after AHRF episode. MRSA negative. UOP slowing, gave Lasix 40mg IVPx1. Ferrer catheter placed for urinary retention     SUBJECTIVE: Patient seen and examined at bedside. Reports shortness of breath and cough have improved. Denies fevers, chills, chest pain, abdominal pain, nausea/vomiting, diarrhea. Requests Miralax for constipation.     ROS: All negative except as listed above.    VITAL SIGNS:  ICU Vital Signs Last 24 Hrs  T(C): 37.3 (30 Aug 2023 06:04), Max: 37.8 (29 Aug 2023 22:12)  T(F): 99.1 (30 Aug 2023 06:04), Max: 100 (29 Aug 2023 22:12)  HR: 68 (30 Aug 2023 07:20) (60 - 144)  BP: 93/49 (30 Aug 2023 07:20) (82/51 - 133/78)  BP(mean): 69 (30 Aug 2023 07:20) (62 - 101)  ABP: --  ABP(mean): --  RR: 37 (30 Aug 2023 07:20) (18 - 44)  SpO2: 96% (30 Aug 2023 07:20) (94% - 100%)    O2 Parameters below as of 30 Aug 2023 07:20  Patient On (Oxygen Delivery Method): nasal cannula, high flow  O2 Flow (L/min): 50  O2 Concentration (%): 60        Plateau pressure:   P/F ratio:     08-29 @ 07:01  -  08-30 @ 07:00  --------------------------------------------------------  IN: 575 mL / OUT: 3625 mL / NET: -3050 mL    08-30 @ 07:01  -  08-30 @ 07:38  --------------------------------------------------------  IN: 25 mL / OUT: 0 mL / NET: 25 mL      CAPILLARY BLOOD GLUCOSE      POCT Blood Glucose.: 148 mg/dL (29 Aug 2023 22:07)      ECG: reviewed.    PHYSICAL EXAM:    GENERAL: lying comfortably in bed on HFNC, in no acute distress  HEAD:  Atraumatic, Normocephalic  EYES: EOMI, conjunctiva and sclera clear  ENT: Moist mucous membranes  NECK: Supple, No JVD  CHEST/LUNG: on HFNC, no use of accessory muscles, lungs clear to auscultation bilaterally   HEART: Regular rate and rhythm; No murmurs, rubs, or gallops  ABDOMEN: obese, nontender, no rebound or guarding, normoactive bowel sounds x4   EXTREMITIES:  warm, 2+ Peripheral Pulses, brisk capillary refill. No clubbing, cyanosis, or edema  NERVOUS SYSTEM:  Alert & Oriented X3, speech clear. No deficits   MSK: FROM all 4 extremities, full and equal strength  SKIN: No rashes or lesions    MEDICATIONS:  MEDICATIONS  (STANDING):  albuterol/ipratropium for Nebulization 3 milliLiter(s) Nebulizer every 6 hours  apixaban 5 milliGRAM(s) Oral every 12 hours  atorvastatin 40 milliGRAM(s) Oral at bedtime  chlorhexidine 2% Cloths 1 Application(s) Topical <User Schedule>  dextrose 5%. 1000 milliLiter(s) (50 mL/Hr) IV Continuous <Continuous>  dextrose 5%. 1000 milliLiter(s) (100 mL/Hr) IV Continuous <Continuous>  dextrose 50% Injectable 25 Gram(s) IV Push once  dextrose 50% Injectable 12.5 Gram(s) IV Push once  dextrose 50% Injectable 25 Gram(s) IV Push once  digoxin     Tablet 125 MICROGram(s) Oral daily  escitalopram 20 milliGRAM(s) Oral daily  glucagon  Injectable 1 milliGRAM(s) IntraMuscular once  insulin lispro (ADMELOG) corrective regimen sliding scale   SubCutaneous at bedtime  letrozole 2.5 milliGRAM(s) Oral daily  metoprolol succinate  milliGRAM(s) Oral every 24 hours  piperacillin/tazobactam IVPB.. 4.5 Gram(s) IV Intermittent every 8 hours  sodium chloride 3%  Inhalation 4 milliLiter(s) Inhalation every 8 hours  spironolactone 25 milliGRAM(s) Oral daily  traZODone 150 milliGRAM(s) Oral at bedtime    MEDICATIONS  (PRN):  dextrose Oral Gel 15 Gram(s) Oral once PRN Blood Glucose LESS THAN 70 milliGRAM(s)/deciliter      ALLERGIES:  Allergies    No Known Allergies    Intolerances      LABS:                        12.5   8.08  )-----------( 165      ( 30 Aug 2023 04:51 )             39.2     08-30    139  |  103  |  16  ----------------------------<  119<H>  4.0   |  27  |  0.76    Ca    9.9      30 Aug 2023 04:51  Phos  3.7     08-30  Mg     2.2     08-30    TPro  6.1  /  Alb  3.6  /  TBili  0.6  /  DBili  x   /  AST  19  /  ALT  9<L>  /  AlkPhos  70  08-30    PT/INR - ( 29 Aug 2023 18:04 )   PT: 12.7 sec;   INR: 1.12          PTT - ( 29 Aug 2023 18:04 )  PTT:32.7 sec  Urinalysis Basic - ( 30 Aug 2023 04:51 )    Color: x / Appearance: x / SG: x / pH: x  Gluc: 119 mg/dL / Ketone: x  / Bili: x / Urobili: x   Blood: x / Protein: x / Nitrite: x   Leuk Esterase: x / RBC: x / WBC x   Sq Epi: x / Non Sq Epi: x / Bacteria: x      ABG:      vBG:    Micro:    Culture - Blood (collected 08-29-23 @ 02:07)  Source: .Blood Blood-Peripheral  Preliminary Report (08-30-23 @ 04:00):    No growth at 1 day.    Culture - Blood (collected 08-29-23 @ 02:07)  Source: .Blood Blood-Peripheral  Preliminary Report (08-30-23 @ 04:00):    No growth at 1 day.       Urinalysis with Rflx Culture (collected 08-29-23 @ 18:32)       Culture - Sputum (collected 08-29-23 @ 16:27)  Source: .Sputum Sputum  Gram Stain (08-29-23 @ 19:18):    Few epithelial cells    Moderate WBC's    Moderate Gram Positive Rods    Rare Gram positive cocci in pairs    Rare Yeast        RADIOLOGY & ADDITIONAL TESTS: Reviewed.

## 2023-08-30 NOTE — PROGRESS NOTE ADULT - ATTENDING COMMENTS
78 yo F w/ hx bronchiectasis, recent exacerbation p/w AHRF likely 2/2 aspiration pneumonitis c/b flash pulmonary edema and transferred to MICU for impending respiratory failure. Now significantly improved after net neg 3.5L. BP borderline low, but likely related to aggressive diuresis and antihypertensive medications. Will d/c anti hypertensives and hold on further diuresis. If BP stabilizes can give repeat dose of lasix. Weaned to nasal cannula. Abx and airway clearance for bronchiectasis exacerbation. Passed S&S eval, but discussed with family aspiration precautions. Stable for transfer to telemetry.

## 2023-08-30 NOTE — PHYSICAL THERAPY INITIAL EVALUATION ADULT - PERTINENT HX OF CURRENT PROBLEM, REHAB EVAL
Pt. is a 77 y.o female h/o Afib, CRISTIN, bronchiectasis currently p/w cough, hypoxia to 85% on RA at home. Pt. has been upgraded to ICU for closer airway monitoring  i/s/o worsening hypoxia/increased O2 requirements. Course further c/b sepsis secondary PNA.

## 2023-08-30 NOTE — PHYSICAL THERAPY INITIAL EVALUATION ADULT - PREDICTED DURATION OF THERAPY (DAYS/WKS), PT EVAL
Pt. would benefit from continued PT f/u to improve functional mobility and endurance; prevent further deconditioning.

## 2023-08-30 NOTE — PHYSICAL THERAPY INITIAL EVALUATION ADULT - GENERAL OBSERVATIONS, REHAB EVAL
Pt. was received supine, on NC=6L/min, + EKG, + IV, + Ferrer, NAD. Cleared to be seen by RN Eric. New MICU orders s/p step up appreciated.

## 2023-08-30 NOTE — PROGRESS NOTE ADULT - ATTENDING COMMENTS
Patient is a 78 yo F w/PMhx HFrEF. Afib on Eliquis s/p PPM vs AICD, bronchiectasis, recent exacerbation, p/w AHRF likely 2/2 aspiration pneumonitis c/b flash pulmonary edema in the setting of acute HTN episode, transferred to MICU for impending respiratory failure, Now clinically improved after diuresis and Positive pressure support    Review of studies:  - EKG 08/29/23: Sinus tachycardia, 1st degree AV block, atypical LBBB  - TTE (08/2023):  Moderately-to-severely reduced left ventricular systolic function (EF 30-35%). Mildly dilated right ventricular size. Mildly reduced right ventricular systolic function. Severely dilated left atrium. Mild-to-moderate aortic regurgitation. Mild-to-moderate mitral regurgitation. Compared to the previous TTE performed on 7/19/2022, pulmonary artery systolic pressure is lower.  - TTE (6/2022): EF 20-25% with global hypokinesis, dilated LA, moderate MR, PASP 36    -#Hypoxic Respiratory Failure with Flash pulmonary Edema in patient with HFrEF and Valvular Heart Disease   - Patient with flash pulmonary edema on 8/29 in setting of HTN episode, requiring BIPAP and aggressive diuresis  - She was started on Lasix 40 IV TID with 3.5 UO with significant clinical improvement  - Clinically she is very well compensated with  JVD but no Bibasilar crackles or lower extremity edema noted on exam. If BP allows, would diurese later today with 40 IV lasix with goal to switch to PO starting 8/31  - Would Cont to Hold spironolactone 25mg and entresto 24/26 given borderline BP  - Cont with Toprol 100 mg po daily  - Etiology of patient's cardiomyopathy is not clear. She follows with Dr. Dickinson at [facility]. Recommend obtaining collaterals about prior ishcemic workup.    # Mild to Moderate MR  - Patient with severely Dilated LA with evidence of Mild ot Moderate MR, stable from Echo from 06/2022  - Echo with EF of 30-35%, mildly dilated LV, and mildly thickened MV with Mitral annular calcification  - Should patient develop worsening MR in the future  she would benefit from Mitral Transcatheter Tctw-at-Outy Repair (KAMILA) evaluation  - Maintain euvolemia as above  - Will resume Afterload reduction with Entresto once BP normalizes    # Afib  - Continue with Eliqusis 5 mg PO BID and Digoxin 125 mICROGRAM daily  - Cont with Toprol 100 mg po daily

## 2023-08-30 NOTE — PROGRESS NOTE ADULT - TIME BILLING
Patient seen and examined with house-staff during bedside rounds.  Resident note read, including vitals, physical findings, laboratory data, and radiological reports.   Revisions included below.  Direct personal management at bed side and extensive interpretation of the data.  Plan was outlined and discussed in details with the housestaff.  Decision making of high complexity  Action taken for acute disease activity to reflect the level of care provided:  - medication reconciliation  - review laboratory data  With diuresis and she is on nasal cannula.  Chest x-ray improved.  She was seen by cardiology.  Continue diuresis.  Continue beta-blocker.  The rate is [id number] .  She is on anticoagulation.  Continue antibiotic.  Out of bed in chair.  Discussed with the family.  Start physical therapy

## 2023-08-30 NOTE — PROGRESS NOTE ADULT - ASSESSMENT
78 yo F w/ afib on eliquis, HTN, DM, PPM, R sided breast ca in the past, bronchiectasis (followed by Dr Hart), CRISTIN on CPAP at night, CHF, presenting with hypoxia at home following aspiration event, found to have AHRF and severe sepsis likely 2/2 to bronchiectasis exacerbation vs aspiration pneumonitis

## 2023-08-30 NOTE — PROGRESS NOTE ADULT - TIME BILLING
Direct personal management at bed side and extensive interpretation of the data.  Plan was outlined and discussed in details with the housestaff.  Decision making of highest complexity  Action taken for acute disease activity to reflect the level of care provided:  - medication reconciliation  - review laboratory data

## 2023-08-30 NOTE — PHYSICAL THERAPY INITIAL EVALUATION ADULT - WEIGHT-BEARING RESTRICTIONS: GAIT, REHAB EVAL
Problem: Falls - Risk of  Goal: *Absence of Falls  Description: Document Sharron Contreras Fall Risk and appropriate interventions in the flowsheet.   Outcome: Progressing Towards Goal  Note: Fall Risk Interventions:  Mobility Interventions: Patient to call before getting OOB    Mentation Interventions: Door open when patient unattended    Medication Interventions: Bed/chair exit alarm    Elimination Interventions: Call light in reach    History of Falls Interventions: Door open when patient unattended weight-bearing as tolerated

## 2023-08-31 ENCOUNTER — TRANSCRIPTION ENCOUNTER (OUTPATIENT)
Age: 78
End: 2023-08-31

## 2023-08-31 DIAGNOSIS — Z51.5 ENCOUNTER FOR PALLIATIVE CARE: ICD-10-CM

## 2023-08-31 DIAGNOSIS — R53.81 OTHER MALAISE: ICD-10-CM

## 2023-08-31 DIAGNOSIS — J69.0 PNEUMONITIS DUE TO INHALATION OF FOOD AND VOMIT: ICD-10-CM

## 2023-08-31 DIAGNOSIS — C50.919 MALIGNANT NEOPLASM OF UNSPECIFIED SITE OF UNSPECIFIED FEMALE BREAST: ICD-10-CM

## 2023-08-31 LAB
ALBUMIN SERPL ELPH-MCNC: 3.3 G/DL — SIGNIFICANT CHANGE UP (ref 3.3–5)
ALP SERPL-CCNC: 62 U/L — SIGNIFICANT CHANGE UP (ref 40–120)
ALT FLD-CCNC: 9 U/L — LOW (ref 10–45)
ANION GAP SERPL CALC-SCNC: 8 MMOL/L — SIGNIFICANT CHANGE UP (ref 5–17)
AST SERPL-CCNC: 11 U/L — SIGNIFICANT CHANGE UP (ref 10–40)
BASOPHILS # BLD AUTO: 0.07 K/UL — SIGNIFICANT CHANGE UP (ref 0–0.2)
BASOPHILS NFR BLD AUTO: 0.9 % — SIGNIFICANT CHANGE UP (ref 0–2)
BILIRUB SERPL-MCNC: 0.6 MG/DL — SIGNIFICANT CHANGE UP (ref 0.2–1.2)
BUN SERPL-MCNC: 13 MG/DL — SIGNIFICANT CHANGE UP (ref 7–23)
CALCIUM SERPL-MCNC: 9.8 MG/DL — SIGNIFICANT CHANGE UP (ref 8.4–10.5)
CHLORIDE SERPL-SCNC: 104 MMOL/L — SIGNIFICANT CHANGE UP (ref 96–108)
CO2 SERPL-SCNC: 28 MMOL/L — SIGNIFICANT CHANGE UP (ref 22–31)
CREAT SERPL-MCNC: 0.66 MG/DL — SIGNIFICANT CHANGE UP (ref 0.5–1.3)
CULTURE RESULTS: SIGNIFICANT CHANGE UP
EGFR: 91 ML/MIN/1.73M2 — SIGNIFICANT CHANGE UP
EOSINOPHIL # BLD AUTO: 0.21 K/UL — SIGNIFICANT CHANGE UP (ref 0–0.5)
EOSINOPHIL NFR BLD AUTO: 2.6 % — SIGNIFICANT CHANGE UP (ref 0–6)
GLUCOSE BLDC GLUCOMTR-MCNC: 106 MG/DL — HIGH (ref 70–99)
GLUCOSE BLDC GLUCOMTR-MCNC: 123 MG/DL — HIGH (ref 70–99)
GLUCOSE BLDC GLUCOMTR-MCNC: 146 MG/DL — HIGH (ref 70–99)
GLUCOSE BLDC GLUCOMTR-MCNC: 262 MG/DL — HIGH (ref 70–99)
GLUCOSE SERPL-MCNC: 105 MG/DL — HIGH (ref 70–99)
HCT VFR BLD CALC: 39 % — SIGNIFICANT CHANGE UP (ref 34.5–45)
HGB BLD-MCNC: 12.2 G/DL — SIGNIFICANT CHANGE UP (ref 11.5–15.5)
IMM GRANULOCYTES NFR BLD AUTO: 0.4 % — SIGNIFICANT CHANGE UP (ref 0–0.9)
LYMPHOCYTES # BLD AUTO: 2.38 K/UL — SIGNIFICANT CHANGE UP (ref 1–3.3)
LYMPHOCYTES # BLD AUTO: 30 % — SIGNIFICANT CHANGE UP (ref 13–44)
MAGNESIUM SERPL-MCNC: 2.2 MG/DL — SIGNIFICANT CHANGE UP (ref 1.6–2.6)
MCHC RBC-ENTMCNC: 29.6 PG — SIGNIFICANT CHANGE UP (ref 27–34)
MCHC RBC-ENTMCNC: 31.3 GM/DL — LOW (ref 32–36)
MCV RBC AUTO: 94.7 FL — SIGNIFICANT CHANGE UP (ref 80–100)
MONOCYTES # BLD AUTO: 0.83 K/UL — SIGNIFICANT CHANGE UP (ref 0–0.9)
MONOCYTES NFR BLD AUTO: 10.5 % — SIGNIFICANT CHANGE UP (ref 2–14)
NEUTROPHILS # BLD AUTO: 4.42 K/UL — SIGNIFICANT CHANGE UP (ref 1.8–7.4)
NEUTROPHILS NFR BLD AUTO: 55.6 % — SIGNIFICANT CHANGE UP (ref 43–77)
NRBC # BLD: 0 /100 WBCS — SIGNIFICANT CHANGE UP (ref 0–0)
PHOSPHATE SERPL-MCNC: 3.2 MG/DL — SIGNIFICANT CHANGE UP (ref 2.5–4.5)
PLATELET # BLD AUTO: 156 K/UL — SIGNIFICANT CHANGE UP (ref 150–400)
POTASSIUM SERPL-MCNC: 3.9 MMOL/L — SIGNIFICANT CHANGE UP (ref 3.5–5.3)
POTASSIUM SERPL-SCNC: 3.9 MMOL/L — SIGNIFICANT CHANGE UP (ref 3.5–5.3)
PROT SERPL-MCNC: 5.5 G/DL — LOW (ref 6–8.3)
RBC # BLD: 4.12 M/UL — SIGNIFICANT CHANGE UP (ref 3.8–5.2)
RBC # FLD: 14.6 % — HIGH (ref 10.3–14.5)
SODIUM SERPL-SCNC: 140 MMOL/L — SIGNIFICANT CHANGE UP (ref 135–145)
SPECIMEN SOURCE: SIGNIFICANT CHANGE UP
WBC # BLD: 7.94 K/UL — SIGNIFICANT CHANGE UP (ref 3.8–10.5)
WBC # FLD AUTO: 7.94 K/UL — SIGNIFICANT CHANGE UP (ref 3.8–10.5)

## 2023-08-31 PROCEDURE — 99223 1ST HOSP IP/OBS HIGH 75: CPT

## 2023-08-31 PROCEDURE — 99233 SBSQ HOSP IP/OBS HIGH 50: CPT | Mod: GC

## 2023-08-31 PROCEDURE — 99232 SBSQ HOSP IP/OBS MODERATE 35: CPT

## 2023-08-31 RX ORDER — FUROSEMIDE 40 MG
40 TABLET ORAL EVERY 24 HOURS
Refills: 0 | Status: DISCONTINUED | OUTPATIENT
Start: 2023-08-31 | End: 2023-09-01

## 2023-08-31 RX ADMIN — SPIRONOLACTONE 25 MILLIGRAM(S): 25 TABLET, FILM COATED ORAL at 07:09

## 2023-08-31 RX ADMIN — Medication 40 MILLIGRAM(S): at 11:25

## 2023-08-31 RX ADMIN — POLYETHYLENE GLYCOL 3350 17 GRAM(S): 17 POWDER, FOR SOLUTION ORAL at 07:07

## 2023-08-31 RX ADMIN — MIDODRINE HYDROCHLORIDE 5 MILLIGRAM(S): 2.5 TABLET ORAL at 07:07

## 2023-08-31 RX ADMIN — LETROZOLE 2.5 MILLIGRAM(S): 2.5 TABLET, FILM COATED ORAL at 11:14

## 2023-08-31 RX ADMIN — SODIUM CHLORIDE 4 MILLILITER(S): 9 INJECTION INTRAMUSCULAR; INTRAVENOUS; SUBCUTANEOUS at 07:08

## 2023-08-31 RX ADMIN — SODIUM CHLORIDE 4 MILLILITER(S): 9 INJECTION INTRAMUSCULAR; INTRAVENOUS; SUBCUTANEOUS at 13:26

## 2023-08-31 RX ADMIN — PIPERACILLIN AND TAZOBACTAM 25 GRAM(S): 4; .5 INJECTION, POWDER, LYOPHILIZED, FOR SOLUTION INTRAVENOUS at 07:06

## 2023-08-31 RX ADMIN — APIXABAN 5 MILLIGRAM(S): 2.5 TABLET, FILM COATED ORAL at 18:36

## 2023-08-31 RX ADMIN — Medication 3 MILLILITER(S): at 18:36

## 2023-08-31 RX ADMIN — MIDODRINE HYDROCHLORIDE 5 MILLIGRAM(S): 2.5 TABLET ORAL at 13:25

## 2023-08-31 RX ADMIN — POLYETHYLENE GLYCOL 3350 17 GRAM(S): 17 POWDER, FOR SOLUTION ORAL at 18:36

## 2023-08-31 RX ADMIN — APIXABAN 5 MILLIGRAM(S): 2.5 TABLET, FILM COATED ORAL at 07:06

## 2023-08-31 RX ADMIN — ATORVASTATIN CALCIUM 40 MILLIGRAM(S): 80 TABLET, FILM COATED ORAL at 22:00

## 2023-08-31 RX ADMIN — Medication 1: at 16:55

## 2023-08-31 RX ADMIN — MIDODRINE HYDROCHLORIDE 5 MILLIGRAM(S): 2.5 TABLET ORAL at 22:00

## 2023-08-31 RX ADMIN — ESCITALOPRAM OXALATE 20 MILLIGRAM(S): 10 TABLET, FILM COATED ORAL at 11:14

## 2023-08-31 RX ADMIN — Medication 3 MILLILITER(S): at 11:15

## 2023-08-31 RX ADMIN — Medication 3 MILLILITER(S): at 01:39

## 2023-08-31 RX ADMIN — Medication 125 MICROGRAM(S): at 07:06

## 2023-08-31 RX ADMIN — PIPERACILLIN AND TAZOBACTAM 25 GRAM(S): 4; .5 INJECTION, POWDER, LYOPHILIZED, FOR SOLUTION INTRAVENOUS at 13:26

## 2023-08-31 RX ADMIN — SODIUM CHLORIDE 4 MILLILITER(S): 9 INJECTION INTRAMUSCULAR; INTRAVENOUS; SUBCUTANEOUS at 22:00

## 2023-08-31 RX ADMIN — Medication 3 MILLILITER(S): at 07:07

## 2023-08-31 RX ADMIN — PIPERACILLIN AND TAZOBACTAM 25 GRAM(S): 4; .5 INJECTION, POWDER, LYOPHILIZED, FOR SOLUTION INTRAVENOUS at 22:00

## 2023-08-31 RX ADMIN — Medication 150 MILLIGRAM(S): at 22:00

## 2023-08-31 NOTE — PROGRESS NOTE ADULT - PROBLEM SELECTOR PLAN 10
Home meds: trazadone 150mg qhs and escitalopram 20mg qd    Plan:  - cont home meds

## 2023-08-31 NOTE — CONSULT NOTE ADULT - CONVERSATION DETAILS
In addition to the EM visit, an advance care planning meeting was performed  Start time: 1000am  End time: 1015am  Total time: 15 minutes   A face to face meeting to discuss advance care planning was held today regarding: MENDELOVITS, MIRIAM  Primary decision maker:  Miriam Mendelovits  Alternate/surrogate: Hernan and Kat  Discussed advance directives including, but not limited to, healthcare proxy and code status.  Decision regarding code status: Full code   Documentation completed today: non    Discussion had with patient and her daughter, attempted to use  but patient and family refused. Introduced role of geriatrics and palliative medicine. Attempted to discuss advance care planning and they did not want to talk about it.

## 2023-08-31 NOTE — CONSULT NOTE ADULT - ASSESSMENT
77 year old woman with debility, aspiration Pneumonia, breast cancer, heart  failure and encounter for palliative care.

## 2023-08-31 NOTE — PROGRESS NOTE ADULT - SUBJECTIVE AND OBJECTIVE BOX
Cardiology Consult    O/N:  Interval History/HPI: Pt seen and examined at bedside, NAD, no complaints at this time. ROS s/f ?, remainder of ROS otherwise unremarkable   Telemetry:    OBJECTIVE  T(C): 37 (08-31-23 @ 07:00), Max: 37.9 (08-30-23 @ 14:27)  HR: 60 (08-31-23 @ 06:29) (58 - 64)  BP: 89/50 (08-31-23 @ 04:32) (75/42 - 102/51)  RR: 16 (08-31-23 @ 06:29) (16 - 40)  SpO2: 96% (08-31-23 @ 06:29) (94% - 100%)    08-30-23 @ 07:01  -  08-31-23 @ 07:00  --------------------------------------------------------  IN: 50 mL / OUT: 1745 mL / NET: -1695 mL        PHYSICAL EXAM:    Constitutional: resting comfortably in bed; NAD  HEENT: NC/AT, PERRL, EOMI, anicteric sclera, no nasal discharge; uvula midline, no oropharyngeal erythema or exudates; MMM  Neck: supple; no thyromegaly, JVP ? cm H20, JVD +/-  Respiratory: CTA B/L; no W/R/R, no retractions  Cardiac: +S1/S2; RRR; no M/R/G; PMI non-displaced  Gastrointestinal: soft, NT/ND; no rebound or guarding; +BSx4  Extremities: WWP, no clubbing or cyanosis; no peripheral edema  Musculoskeletal: NROM x4; no joint swelling, tenderness or erythema  Vascular: 2+ radial, DP/PT pulses B/L  Dermatologic: skin warm, dry and intact; no rashes, wounds, or scars  Lymphatic: no submandibular or cervical LAD  Neurologic: AAOx3; CNII-XII grossly intact; no focal deficits    LABS:                        12.2   7.94  )-----------( 156      ( 31 Aug 2023 05:30 )             39.0     08-31    140  |  104  |  13  ----------------------------<  105<H>  3.9   |  28  |  0.66    Ca    9.8      31 Aug 2023 05:30  Phos  3.2     08-31  Mg     2.2     08-31    TPro  5.5<L>  /  Alb  3.3  /  TBili  0.6  /  DBili  x   /  AST  11  /  ALT  9<L>  /  AlkPhos  62  08-31    PT/INR - ( 29 Aug 2023 18:04 )   PT: 12.7 sec;   INR: 1.12          PTT - ( 29 Aug 2023 18:04 )  PTT:32.7 sec  Urinalysis Basic - ( 31 Aug 2023 05:30 )    Color: x / Appearance: x / SG: x / pH: x  Gluc: 105 mg/dL / Ketone: x  / Bili: x / Urobili: x   Blood: x / Protein: x / Nitrite: x   Leuk Esterase: x / RBC: x / WBC x   Sq Epi: x / Non Sq Epi: x / Bacteria: x        RADIOLOGY & ADDITIONAL TESTS:  Reviewed .    MEDICATIONS  (STANDING):  albuterol/ipratropium for Nebulization 3 milliLiter(s) Nebulizer every 6 hours  apixaban 5 milliGRAM(s) Oral every 12 hours  atorvastatin 40 milliGRAM(s) Oral at bedtime  chlorhexidine 2% Cloths 1 Application(s) Topical <User Schedule>  dextrose 5%. 1000 milliLiter(s) (100 mL/Hr) IV Continuous <Continuous>  dextrose 5%. 1000 milliLiter(s) (50 mL/Hr) IV Continuous <Continuous>  dextrose 50% Injectable 25 Gram(s) IV Push once  dextrose 50% Injectable 25 Gram(s) IV Push once  dextrose 50% Injectable 12.5 Gram(s) IV Push once  digoxin     Tablet 125 MICROGram(s) Oral daily  escitalopram 20 milliGRAM(s) Oral daily  glucagon  Injectable 1 milliGRAM(s) IntraMuscular once  insulin lispro (ADMELOG) corrective regimen sliding scale   SubCutaneous Before meals and at bedtime  letrozole 2.5 milliGRAM(s) Oral daily  metoprolol succinate  milliGRAM(s) Oral every 24 hours  midodrine 5 milliGRAM(s) Oral every 8 hours  piperacillin/tazobactam IVPB.. 4.5 Gram(s) IV Intermittent every 8 hours  polyethylene glycol 3350 17 Gram(s) Oral every 12 hours  sodium chloride 3%  Inhalation 4 milliLiter(s) Inhalation every 8 hours  spironolactone 25 milliGRAM(s) Oral daily  traZODone 150 milliGRAM(s) Oral at bedtime    MEDICATIONS  (PRN):  dextrose Oral Gel 15 Gram(s) Oral once PRN Blood Glucose LESS THAN 70 milliGRAM(s)/deciliter     Cardiology Consult    O/N: MAPs 62-65, started on midodrine 5 mg q8  Interval History/HPI: Pt seen and examined at bedside, NAD. States she is feeling better since admission. Still with productive cough, producing green sputum. Denies any palpitations, CP, worsening SOB, or dizziness.   Telemetry: V-paced, 66 bpm with occasional PVCs    OBJECTIVE  T(C): 37 (08-31-23 @ 07:00), Max: 37.9 (08-30-23 @ 14:27)  HR: 60 (08-31-23 @ 06:29) (58 - 64)  BP: 89/50 (08-31-23 @ 04:32) (75/42 - 102/51)  RR: 16 (08-31-23 @ 06:29) (16 - 40)  SpO2: 96% (08-31-23 @ 06:29) (94% - 100%)    08-30-23 @ 07:01  -  08-31-23 @ 07:00  --------------------------------------------------------  IN: 50 mL / OUT: 1745 mL / NET: -1695 mL        PHYSICAL EXAM:    Constitutional: resting comfortably in bed; NAD  HEENT: NC/AT, PERRL, EOMI, anicteric sclera, no nasal discharge; + 4L NC  Neck: supple; no thyromegaly, JVP 8 cm H20, no JVD  Respiratory: CTA B/L; no W/R/R, no retractions  Cardiac: +S1/S2; RRR; no M/R/G; PMI non-displaced  Gastrointestinal: soft, NT/ND; no rebound or guarding; +BSx4  Extremities: WWP, no clubbing or cyanosis; no peripheral edema  Vascular: 2+ radial, DP/PT pulses B/L    LABS:                        12.2   7.94  )-----------( 156      ( 31 Aug 2023 05:30 )             39.0     08-31    140  |  104  |  13  ----------------------------<  105<H>  3.9   |  28  |  0.66    Ca    9.8      31 Aug 2023 05:30  Phos  3.2     08-31  Mg     2.2     08-31    TPro  5.5<L>  /  Alb  3.3  /  TBili  0.6  /  DBili  x   /  AST  11  /  ALT  9<L>  /  AlkPhos  62  08-31    PT/INR - ( 29 Aug 2023 18:04 )   PT: 12.7 sec;   INR: 1.12          PTT - ( 29 Aug 2023 18:04 )  PTT:32.7 sec  Urinalysis Basic - ( 31 Aug 2023 05:30 )    Color: x / Appearance: x / SG: x / pH: x  Gluc: 105 mg/dL / Ketone: x  / Bili: x / Urobili: x   Blood: x / Protein: x / Nitrite: x   Leuk Esterase: x / RBC: x / WBC x   Sq Epi: x / Non Sq Epi: x / Bacteria: x        RADIOLOGY & ADDITIONAL TESTS:  Reviewed .    MEDICATIONS  (STANDING):  albuterol/ipratropium for Nebulization 3 milliLiter(s) Nebulizer every 6 hours  apixaban 5 milliGRAM(s) Oral every 12 hours  atorvastatin 40 milliGRAM(s) Oral at bedtime  chlorhexidine 2% Cloths 1 Application(s) Topical <User Schedule>  dextrose 5%. 1000 milliLiter(s) (100 mL/Hr) IV Continuous <Continuous>  dextrose 5%. 1000 milliLiter(s) (50 mL/Hr) IV Continuous <Continuous>  dextrose 50% Injectable 25 Gram(s) IV Push once  dextrose 50% Injectable 25 Gram(s) IV Push once  dextrose 50% Injectable 12.5 Gram(s) IV Push once  digoxin     Tablet 125 MICROGram(s) Oral daily  escitalopram 20 milliGRAM(s) Oral daily  glucagon  Injectable 1 milliGRAM(s) IntraMuscular once  insulin lispro (ADMELOG) corrective regimen sliding scale   SubCutaneous Before meals and at bedtime  letrozole 2.5 milliGRAM(s) Oral daily  metoprolol succinate  milliGRAM(s) Oral every 24 hours  midodrine 5 milliGRAM(s) Oral every 8 hours  piperacillin/tazobactam IVPB.. 4.5 Gram(s) IV Intermittent every 8 hours  polyethylene glycol 3350 17 Gram(s) Oral every 12 hours  sodium chloride 3%  Inhalation 4 milliLiter(s) Inhalation every 8 hours  spironolactone 25 milliGRAM(s) Oral daily  traZODone 150 milliGRAM(s) Oral at bedtime    MEDICATIONS  (PRN):  dextrose Oral Gel 15 Gram(s) Oral once PRN Blood Glucose LESS THAN 70 milliGRAM(s)/deciliter

## 2023-08-31 NOTE — PROVIDER CONTACT NOTE (OTHER) - SITUATION
Patient had 4 beats of V-tach on monitor, followed by 2 more beats. Patient not complaining of any discomfort, fully alert and oriented. MD at bedside.
MD notified of patient heart rate lowering to 30s. Md at bedside. Patient does not complain of any discomfort or distress.

## 2023-08-31 NOTE — PROGRESS NOTE ADULT - ATTENDING COMMENTS
Patient is a 76 yo F w/PMhx HFrEF. Afib on Eliquis s/p PPM vs AICD, bronchiectasis, recent exacerbation, p/w AHRF likely 2/2 aspiration pneumonitis c/b flash pulmonary edema in the setting of acute HTN episode, transferred to MICU for impending respiratory failure, Now clinically improved after diuresis and Positive pressure support    Review of studies:  - EKG 08/29/23: Sinus tachycardia, 1st degree AV block, atypical LBBB  - TTE (08/2023):  Moderately-to-severely reduced left ventricular systolic function (EF 30-35%). Mildly dilated right ventricular size. Mildly reduced right ventricular systolic function. Severely dilated left atrium. Mild-to-moderate aortic regurgitation. Mild-to-moderate mitral regurgitation. Compared to the previous TTE performed on 7/19/2022, pulmonary artery systolic pressure is lower.  - TTE (6/2022): EF 20-25% with global hypokinesis, dilated LA, moderate MR, PASP 36    -#Hypoxic Respiratory Failure with Flash pulmonary Edema in patient with HFrEF and Valvular Heart Disease   - Patient with flash pulmonary edema on 8/29 in setting of HTN episode, requiring BIPAP and aggressive diuresis  - Clinically she has diuresed over 5 Liters and is overall net negative 4.7 L with significant clinical improvement  - Clinically she is very well compensated without JVD, Bibasilar crackles or lower extremity edema noted on exam.   - At this time would resume Diuresis with lasix 40 mg po daily starting 8/31  - Can cont spironolactone 25mg and entresto 24/26 given borderline BP  - Cont with Toprol 100 mg po daily  - Etiology of patient's cardiomyopathy is not clear. She follows with Dr. Dickinson at [facility]. Recommend obtaining collaterals about prior ischemic workup.    # Mild to Moderate MR  - Patient with severely Dilated LA with evidence of Mild ot Moderate MR, stable from Echo from 06/2022  - Echo with EF of 30-35%, mildly dilated LV, and mildly thickened MV with Mitral annular calcification  - Should patient develop worsening MR in the future  she would benefit from Mitral Transcatheter Bdyr-ej-Tuxo Repair (KAMILA) evaluation  - Maintain euvolemia as above  - Will resume Afterload reduction with Entresto once BP normalizes    # Afib  - Continue with Eliqusis 5 mg PO BID and Digoxin 125 mICROGRAM daily  - Cont with Toprol 100 mg po daily . Patient is a 78 yo F w/PMhx HFrEF. Afib on Eliquis s/p PPM vs AICD, bronchiectasis, recent exacerbation, p/w AHRF likely 2/2 aspiration pneumonitis c/b flash pulmonary edema in the setting of acute HTN episode, transferred to MICU for impending respiratory failure, Now clinically improved after diuresis and Positive pressure support    Review of studies:  - EKG 08/29/23: Sinus tachycardia, 1st degree AV block, atypical LBBB  - TTE (08/2023):  Moderately-to-severely reduced left ventricular systolic function (EF 30-35%). Mildly dilated right ventricular size. Mildly reduced right ventricular systolic function. Severely dilated left atrium. Mild-to-moderate aortic regurgitation. Mild-to-moderate mitral regurgitation. Compared to the previous TTE performed on 7/19/2022, pulmonary artery systolic pressure is lower.  - TTE (6/2022): EF 20-25% with global hypokinesis, dilated LA, moderate MR, PASP 36    -#Hypoxic Respiratory Failure with Flash pulmonary Edema in patient with HFrEF and Valvular Heart Disease   - Patient with flash pulmonary edema on 8/29 in setting of HTN episode, requiring BIPAP and aggressive diuresis  - Clinically she has diuresed over 5 Liters and is overall net negative 4.7 L with significant clinical improvement  - Clinically she is very well compensated without JVD, Bibasilar crackles or lower extremity edema noted on exam.   - At this time would resume Diuresis with lasix 40 mg po daily starting 8/31  - Can cont spironolactone 25mg and cont to hold entresto 24/26 given borderline BP and need for midodrine  - Cont with Toprol 100 mg po daily. Would wean off midodrine while on BB as tolerated  - Etiology of patient's cardiomyopathy is not clear. She follows with Dr. Dickinson at [facility]. Recommend obtaining collaterals about prior ischemic workup.    # Mild to Moderate MR  - Patient with severely Dilated LA with evidence of Mild ot Moderate MR, stable from Echo from 06/2022  - Echo with EF of 30-35%, mildly dilated LV, and mildly thickened MV with Mitral annular calcification  - Should patient develop worsening MR in the future  she would benefit from Mitral Transcatheter Rgeb-ue-Abhj Repair (KAMILA) evaluation  - Maintain euvolemia as above  - Will resume Afterload reduction with Entresto once BP normalizes    # Afib  - Continue with Eliqusis 5 mg PO BID and Digoxin 125 mICROGRAM daily  - Cont with Toprol 100 mg po daily .

## 2023-08-31 NOTE — PROGRESS NOTE ADULT - PROBLEM SELECTOR PLAN 4
Home meds: digoxin .152mcg qd, toprol XL 100mg qd and eliquis 5mg BID  -dig level wnl  - Cont digoxin   - Cont eliquis 5mg BID
Home meds: digoxin .152mcg qd, toprol XL 100mg qd and eliquis 5mg BID    Plan:  - F/U dig level  - Cont digoxin   - Cont eliquis 5mg BID
Home meds: digoxin .152mcg qd, toprol XL 100mg qd and eliquis 5mg BID    Plan:  - Cont digoxin  - Cont eliquis 5mg BID

## 2023-08-31 NOTE — PROGRESS NOTE ADULT - PROBLEM SELECTOR PLAN 9
Home meds: letrozole 2.5 mg qd    Plan:  - Cont home meds

## 2023-08-31 NOTE — DISCHARGE NOTE PROVIDER - NSDCCPCAREPLAN_GEN_ALL_CORE_FT
PRINCIPAL DISCHARGE DIAGNOSIS  Diagnosis: Aspiration pneumonitis  Assessment and Plan of Treatment: You experienced aspiration pneumonitis, a condition that happens then food accidentally enters the wind pipes. Because of this, your lungs had difficulty bringing oxygen to the body, leading to symptoms such as shortness of breath, cough, and low blood oxygen. During your stay, you were treated with Duonebs to improve air flow and High Flow Nasal Cannula for powerful oxygen delivery. You were also given the antibiotic Zosyn to prevent risk of developing pneumonia.  ---  > ***PLEASE FOLLOW UP*** with primary care physician Dr. Good.  > ***PLEASE FOLLOW UP*** with Dr. Hart at his office to monitor your condition.      SECONDARY DISCHARGE DIAGNOSES  Diagnosis: Chronic atrial fibrillation  Assessment and Plan of Treatment: You were seen by cardiology for your irregular heartbeat. During your stay, you were given a reduced dose of your home Toprol.  ---  > Continue to take Toprol at your usual home dose.   > ***Please FOLLOW UP*** with a cardiologist to monitor your heartbeat.    Diagnosis: Chronic heart failure  Assessment and Plan of Treatment: You were seen by cardiology for your heart failure.  ---  > ***Please START TAKING*** Lasix 40mg daily.  > ***Please START TAKING*** Midodrine 5mg, every 8 hours to increase your blood pressure. This medication will be slowly tapered off by your cardiologist.  > ***Please STOP TAKING*** your Entresto. Please follow up with your cardiologist to restart your Entresto.  > Continue taking your home spirinolactone.     PRINCIPAL DISCHARGE DIAGNOSIS  Diagnosis: Aspiration pneumonitis  Assessment and Plan of Treatment: You experienced aspiration pneumonitis, a condition that happens then food accidentally enters the wind pipes. Because of this, your lungs had difficulty bringing oxygen to the body, leading to symptoms such as shortness of breath, cough, and low blood oxygen. During your stay, you were treated with Duonebs to improve air flow and High Flow Nasal Cannula for powerful oxygen delivery. You were also given the antibiotic Zosyn to prevent risk of developing pneumonia.  ---  > ***PLEASE FOLLOW UP*** with primary care physician Dr. Good.  > ***PLEASE FOLLOW UP*** with Dr. Hart at his office to monitor your condition.      SECONDARY DISCHARGE DIAGNOSES  Diagnosis: Chronic atrial fibrillation  Assessment and Plan of Treatment: You were seen by cardiology for your irregular heartbeat. During your stay, you were given a reduced dose of your home Toprol.  ---  > Continue to take Toprol at your usual home dose.   > ***Please FOLLOW UP*** with cardiologist Dr. Dickinson of [facility] today (09/01) at 2:00PM to monitor your heartbeat.    Diagnosis: Chronic heart failure  Assessment and Plan of Treatment: You were seen by cardiology for your heart failure.  ---  > ***Please START TAKING*** Lasix 40mg daily.  > ***Please START TAKING*** Midodrine 5mg, every 8 hours to increase your blood pressure. This medication will be slowly tapered off by your cardiologist.  > ***Please STOP TAKING*** your Entresto. Please follow up with your cardiologist to restart your Entresto.  > Continue taking your home spirinolactone.     PRINCIPAL DISCHARGE DIAGNOSIS  Diagnosis: Aspiration pneumonitis  Assessment and Plan of Treatment: You experienced aspiration pneumonitis, a condition that happens then food accidentally enters the wind pipes. Because of this, your lungs had difficulty bringing oxygen to the body, leading to symptoms such as shortness of breath, cough, and low blood oxygen. During your stay, you were treated with Duonebs to improve air flow and High Flow Nasal Cannula for powerful oxygen delivery. You were also given the antibiotic cefpodoxime 200mg every 12 hours for 2 days to prevent risk of developing pneumonia.  ---  > ***PLEASE FOLLOW UP*** with primary care physician Dr. Good.  > ***PLEASE FOLLOW UP*** with Dr. Hart at his office to monitor your condition.      SECONDARY DISCHARGE DIAGNOSES  Diagnosis: Chronic atrial fibrillation  Assessment and Plan of Treatment: You were seen by cardiology for your irregular heartbeat. During your stay, you were given a reduced dose of your home Toprol.  ---  > Continue to take Toprol at your usual home dose.   > ***Please FOLLOW UP*** with cardiologist Dr. Dickinson of [facility] today (09/01) at 2:00PM to monitor your heartbeat.    Diagnosis: Chronic heart failure  Assessment and Plan of Treatment: You were seen by cardiology for your heart failure.  ---  > ***Please START TAKING*** Lasix 40mg daily.  > ***Please START TAKING*** Midodrine 5mg, every 8 hours to increase your blood pressure. This medication will be slowly tapered off by your cardiologist.  > ***Please STOP TAKING*** your Entresto. Please follow up with your cardiologist to restart your Entresto.  > Continue taking your home spirinolactone.

## 2023-08-31 NOTE — DISCHARGE NOTE PROVIDER - HOSPITAL COURSE
#Discharge: do not delete    Patient is __ yo M/F with past medical history of _____  Presented with _____, found to have _____  Problem List/Main Diagnoses (system-based):   Inpatient treatment course:   Patient was discharged to:  New medications:   Changes to old medications:   Medications that were stopped:  Items to Follow up as outpatient   Physical exam at time of discharge: #Discharge: do not delete    76 yo F w| afib on eliquis, HTN, DM. R sided breast ca in, bronchiectasis (followed by Dr Hart). CRISTIN on CPAP and home O2, and CHF. initially presented with sudden cough after eating w associated dyspnea, reported fever to 101, and  hypoxia to 84% that did not improve with home O2. In the ED, pt was found with low-grade fever of 100.1F, hypotensive to 90/55, satting at 85% on RA. Also found to have elevated WBC 17.5 and lactate 2.2 >> 2.0 with hydration. Initially admitted to [facility] for aspiration PNA, stepped up to MICU for acute shortness of breath and hypoxia to 80s on 4L NC. In MICU, Ferrer catheter was placed for urinary retention and patient was placed on HFNC, and pt was subsequently stepped down to [facility] for stable respiratory status. During her stay, she was empirically treated with Zosyn for asp PNA. Pt satting at *** on *** at time of discharge.    Problem List/Main Diagnoses (system-based):   #AHRF  #Aspiration Pneumonitis  Presented hypoxic to 85% on RA, likely precipitated by witnessed aspiration event. Required HFNC during her stay, succesfully weaned to *** at discharge.   • Completed empiric Zosyn 3.375g IV q8hrs for 4 days during her stay   • Received standing Duonebs q6h   • Passed speech and swallow, given regular diet during stay    #Chronic atrial fibrillation  On home Digoxin .152mcg qd, Toprol XL 100mg qd and Eliquis 5mg BID. Followed by cardiology during her stay.   • Pt was on reduced to Toprol 25mg BID during stay, resumed home meds/doses on discharge.    #CHF  On home spironolactone 25mg, Toprol XL 100mg, Entresto 24/26 and 49/51   • Pt was given Toprol XL 25mg BID during her stay, remaining home meds held during stay.    • On discharge, will start Lasix 40mg qd, as tolerated, and resume home dose Toprol 100 daily and home aldactone 25mg. Hold home Entresto, will plan to resume home Entresto after weaned off midodrine.   • Will wean midodrine outpt.     #DM  On home Jardiance 10mg qd   • Treated with mISS and consistent carb diet.      Patient was discharged to: Home    New medications: Lasix 40mg qd, Midodrine  Changes to old medications: None  Medications that were stopped: Entresto held    Items to Follow up as outpatient: Dr. Hart and Cardiology    Physical exam at time of discharge:  General: Alert and oriented x 3. No acute distress. Well-nourished.  Eyes: EOMI. Anicteric.  HEENT: Moist mucous membranes. No scleral icterus. No cervical lymphadenopathy.  Lungs: Clear to auscultation bilaterally. No accessory muscle use. Tachypneic (improved, at baseline)  Cardiovascular: Regular rate and rhythm. No murmur. No JVD.  Abdomen: Soft, non-tender and non-distended. No palpable masses.  Extremities: No edema. Non-tender.  Skin: No rashes or lesions. Warm.  Neurologic: No focal neurological deficits. CN II-XII grossly intact, but not individually tested.  Psychiatric: Cooperative. Appropriate mood and affect.   #Discharge: do not delete    78 yo F w/ afib on eliquis, HTN, DM. R sided breast ca in, bronchiectasis (followed by Dr Hart). CRISTIN on CPAP and home O2, and CHF. initially presented with sudden cough after eating w associated dyspnea, reported fever to 101, and  hypoxia to 84% that did not improve with home O2. In the ED, pt was found with low-grade fever of 100.1F, hypotensive to 90/55, satting at 85% on RA. Also found to have elevated WBC 17.5 and lactate 2.2 >> 2.0 with hydration. Initially admitted to [facility] for aspiration PNA, stepped up to MICU for acute shortness of breath and hypoxia to 80s on 4L NC. In MICU, Ferrer catheter was placed for urinary retention and patient was placed on HFNC, and pt was subsequently stepped down to [facility] for stable respiratory status. During her stay, she was empirically treated with Zosyn for asp PNA. Pt satting at *** on *** at time of discharge.    Problem List/Main Diagnoses (system-based):   #AHRF  #Aspiration Pneumonitis  Presented hypoxic to 85% on RA, likely precipitated by witnessed aspiration event. Required HFNC during her stay, succesfully weaned to *** at discharge.   • Completed empiric Zosyn 3.375g IV q8hrs for 4 days during her stay   • Received standing Duonebs q6h   • Passed speech and swallow, given regular diet during stay    #Chronic atrial fibrillation  On home Digoxin .152mcg qd, Toprol XL 100mg qd and Eliquis 5mg BID. Followed by cardiology during her stay.   • Pt was on reduced to Toprol 25mg BID during stay, resumed home meds/doses on discharge.    #CHF  On home spironolactone 25mg, Toprol XL 100mg, Entresto 24/26 and 49/51   • Pt was given Toprol XL 25mg BID during her stay, remaining home meds held during stay.    • On discharge, will start Lasix 40mg qd, as tolerated, and resume home dose Toprol 100 daily and home aldactone 25mg. Hold home Entresto, will plan to resume home Entresto after weaned off midodrine.   • Will wean midodrine outpt.     #DM  On home Jardiance 10mg qd   • Treated with mISS and consistent carb diet.      Patient was discharged to: Home    New medications: Lasix 40mg qd, Midodrine  Changes to old medications: None  Medications that were stopped: Entresto held    Items to Follow up as outpatient: Dr. Hart and Cardiology    Physical exam at time of discharge:  General: Alert and oriented x 3. No acute distress. Well-nourished.  Eyes: EOMI. Anicteric.  HEENT: Moist mucous membranes. No scleral icterus. No cervical lymphadenopathy.  Lungs: Clear to auscultation bilaterally. No accessory muscle use. Tachypneic (improved, at baseline)  Cardiovascular: Regular rate and rhythm. No murmur. No JVD.  Abdomen: Soft, non-tender and non-distended. No palpable masses.  Extremities: No edema. Non-tender.  Skin: No rashes or lesions. Warm.  Neurologic: No focal neurological deficits. CN II-XII grossly intact, but not individually tested.  Psychiatric: Cooperative. Appropriate mood and affect.   #Discharge: do not delete    76 yo F w/ afib on eliquis, HTN, DM. R sided breast ca in, bronchiectasis (followed by Dr Hart). CRISTIN on CPAP and home O2, and CHF. initially presented with sudden cough after eating w associated dyspnea, reported fever to 101, and  hypoxia to 84% that did not improve with home O2. In the ED, pt was found with low-grade fever of 100.1F, hypotensive to 90/55, satting at 85% on RA. Also found to have elevated WBC 17.5 and lactate 2.2 >> 2.0 with hydration. Initially admitted to [facility] for aspiration PNA, stepped up to MICU for acute shortness of breath and hypoxia to 80s on 4L NC. In MICU, Ferrer catheter was placed for urinary retention and patient was placed on HFNC, and pt was subsequently stepped down to [facility] for stable respiratory status. During her stay, she was empirically treated with Zosyn for asp PNA. Pt satting at 2L  at time of discharge.    Problem List/Main Diagnoses (system-based):   #AHRF  #Aspiration Pneumonitis  Presented hypoxic to 85% on RA, likely precipitated by witnessed aspiration event. Required HFNC during her stay, succesfully weaned to at discharge.   • Completed empiric Zosyn 3.375g IV q8hrs for 4 days during her stay   • Received standing Duonebs q6h   • Passed speech and swallow, given regular diet during stay        #Chronic atrial fibrillation  On home Digoxin .152mcg qd, Toprol XL 100mg qd and Eliquis 5mg BID. Followed by cardiology during her stay.   • Pt was on reduced to Toprol 25mg BID during stay, resumed home meds/doses on discharge.    #CHF  On home spironolactone 25mg, Toprol XL 100mg, Entresto 24/26 and 49/51   • Pt was given Toprol XL 25mg BID during her stay, remaining home meds held during stay.    • On discharge, will start Lasix 40mg qd, as tolerated, and resume home dose Toprol 100 daily and home aldactone 25mg. Hold home Entresto, will plan to resume home Entresto after weaned off midodrine.   • Will wean midodrine outpt.     #DM  On home Jardiance 10mg qd   • Treated with mISS and consistent carb diet.      Patient was discharged to: Home    New medications: Lasix 40mg qd, Midodrine  Changes to old medications: None  Medications that were stopped: Entresto held    Items to Follow up as outpatient: Dr. Hart and Cardiology    Physical exam at time of discharge:  General: Alert and oriented x 3. No acute distress. Well-nourished.  Eyes: EOMI. Anicteric.  HEENT: Moist mucous membranes. No scleral icterus. No cervical lymphadenopathy.  Lungs: Clear to auscultation bilaterally. No accessory muscle use. Tachypneic (improved, at baseline)  Cardiovascular: Regular rate and rhythm. No murmur. No JVD.  Abdomen: Soft, non-tender and non-distended. No palpable masses.  Extremities: No edema. Non-tender.  Skin: No rashes or lesions. Warm.  Neurologic: No focal neurological deficits. CN II-XII grossly intact, but not individually tested.  Psychiatric: Cooperative. Appropriate mood and affect.

## 2023-08-31 NOTE — DISCHARGE NOTE PROVIDER - YES NO FOR MLM POSITIVE OR NEGATIVE COVID RESULT
Jo Ann Mckinley is a 62 y.o. female thatpresents for Established New Doctor (Dr Loyd Pinto ), Mass (lump under right ear getting bigger, pain when touching for past 4 months ), and Mouth Lesions (lesion on lower lip for past 2 months, no pain with this )        HPI:    Skin Lesion    HPI:    Location - right lateral neck  Length of time it has been present - 4 months  Recent change in size, color or shape? - Yes - a little larger, it is a little erythematous  Pruritic? No  Bleeding or drainage? No  Painful? Does not hurt at rest, but states 'I mess with it and it will hurt a little'      Skin Lesion    HPI:    Location - mid lower leg  Length of time it has been present - 2 months  Recent change in size, color or shape? - will come up like a small 'tag', she will pick at it and it will fall off  Pruritic? No  Bleeding or drainage? No  Painful? No      I have reviewed the patient's past medical history, past surgical history, allergies,medications, social and family history and I have made updates where appropriate. History reviewed. No pertinent past medical history. Past Surgical History:   Procedure Laterality Date    EYE SURGERY      JOINT REPLACEMENT Bilateral     TUBAL LIGATION         Social History     Tobacco Use    Smoking status: Never Smoker    Smokeless tobacco: Never Used   Substance Use Topics    Alcohol use: No    Drug use: No       History reviewed. No pertinent family history.       Review of Systems        PHYSICAL EXAM:  /78   Pulse 84   Temp 98.8 °F (37.1 °C) (Oral)   Resp 14   Ht 5' 2\" (1.575 m)   Wt 197 lb (89.4 kg)   SpO2 99%   BMI 36.03 kg/m²     General Appearance: well developed and well- nourished, in no acute distress  Head: normocephalic and atraumatic  Eyes: pupils equal, round, and reactive to light,  conjunctivae and eye lids without erythema  ENT: external ear and ear canal normal bilaterally, nose without deformity, nasal mucosa and turbinates normal , without polyps, oropharynx normal, dentition is normal for age, no lip or gum lesions noted  Pulmonary/Chest: clear to auscultation bilaterally- no wheezes, rales or rhonchi, normal air movement, no respiratory distress orretractions  Cardiovascular: normal rate, regular rhythm, normal S1 and S2, no murmurs, rubs, clicks, or gallops,distal pulses intact  Extremities: no cyanosis, clubbing or edema of the lowerextremities  Musculoskeletal: No joint swelling or gross deformity   Neuro:  Alert, 5/5 strength globally and symmetrically, normal speech, no focal findings or movement disorder noted, gait wnl  Psych:  Normal affect without evidence of depression or anxiety, insight and judgement are appropriate, memoryappears intact  Skin: warm and dry, there is a small area of the lower lip with scaling, no ulceration. On the right lateral neck there is 1.5cm firm round mass that is movable, no drainage expressed, consistent with a sebaceous cyst, no cervicla lymphadenopathy  Lymph:  No cervical, auricular or supraclavicular lymph nodes palpated    ASSESSMENT & PLAN  Darling was seen today for established new doctor, mass and mouth lesions. Diagnoses and all orders for this visit:    Sebaceous cyst    Lesion of lip    -She already has follow up with dermatology next week to address the 2 issues. Will let me know if sx persist.    Return if symptoms worsen or fail to improve. All copied or forwarded information in the progress note was verified by me to be accurate at the time of visit  Patient's past medical, surgical, social and family history were reviewed and updated     All patient questions answered. Patient voiced understanding.

## 2023-08-31 NOTE — PHARMACY COMMUNICATION NOTE - COMMENTS
Primary pharmacy updated: [facility]  Admission medication:  -	Atorvastatin 40mg qhs  -	Digoxin 125mcg daily  -	Entresto 24mg-26mg BID  -	Escitalopram 20mg daily  -	Furosemide 40mg daily   -	Jardiance 10mg daily  -	Letrozole 2.5mg daily  -	Metformin 500mg BID  -	Spiriva Respimat 2.5mcg/inh 2 puffs inhaled once daily  -	Spironolactone 25mg daily  -	Metoprolol XL 100mg daily  -	Trazodone 150mg QHS  -	Eliquis 5mg BID    Spoke with pharmacy and patient’s daughter to confirm home medication. Updated MD as below:  -	Take furosemide 40mg daily instead of 20mg daily (per pharmacy and patient daughter)  -	Take Eliquis 5mg BID (per pharmacy and patient daughter)

## 2023-08-31 NOTE — DISCHARGE NOTE PROVIDER - CARE PROVIDER_API CALL
Clayton Good  Internal Medicine  [address] [place] [address]  Phone: [phone]  Fax: [phone]  Established Patient  Follow Up Time: 2 weeks   Clayton Good  Internal Medicine  [address] [place], [place] [address]  Phone: [phone]  Fax: [phone]  Established Patient  Follow Up Time: 2 weeks    Matias Dickinson  Cardiology  [address],    Phone: ([phone]  Fax: ([phone]  Established Patient  Scheduled Appointment: 09/01/2023 02:00 PM   Matias Dickinson  Cardiology  [address],    Phone: ([phone]  Fax: ([phone]  Established Patient  Scheduled Appointment: 09/01/2023 02:00 PM    Clayton Good  Internal Medicine  [address] [place] [address]  Phone: [phone]  Fax: (   )    -  Established Patient  Scheduled Appointment: 09/08/2023 11:00 AM

## 2023-08-31 NOTE — CONSULT NOTE ADULT - SUBJECTIVE AND OBJECTIVE BOX
HPI:  78 yo F w/ afib on eliquis, HTN, DM, PPM, R sided breast ca in the past, bronchiectasis (followed by Dr Hart), CRISTIN on CPAP at night, CHF, presenting with hypoxia. Pt was in usual state of health yesterday, per family and pt  today at 8pm was eating and began coughing and was febrile to 101 and dyspneic with hypoxia to 84% on RA which improved w/ NC, they did home labs w/ WBC 20 and lactate 2.2 so brought her to ED. Pt was recently admitted at [facility] 2 months ago for sepsis 2/2 aspiration pneumonia where she was admitted to the ICU requiring pressors.  Pt was seen in Dr. Hart's office 8/28 for follow up for bronchiectasis, complaining of persistent cough for 12 days, she was prescribed a 7 day course of cefdinir and acetylcysteine inhalation  	  In the ED:  Initial vital signs: T: 100.1F, HR: 131, BP: 90/52, R: 26, SpO2: 85% on RA  Labs: significant for WBC 17.05 (83% neutrophil predom), PT/INR 17.6/1.56, Lactate 2.5 >> 2, BUN 33, Glu 214, U/A glu >1000  Imaging:  CXR: no acute cardiopulmonary findings  EKG: afib, , QTc 464  Medications: zosyn 3.375g IV, tylenol 650mg PO, vanc 1.25g IV, LR 1.6L  Consults: none  (29 Aug 2023 02:58)      ROS: A 10-point review of systems was otherwise negative.    PAST MEDICAL & SURGICAL HISTORY:  Chronic heart failure      Diabetes mellitus      CRISTIN on CPAP      HTN (hypertension)      Hyperlipidemia      History of permanent cardiac pacemaker placement        SOCIAL HISTORY:  FAMILY HISTORY:    ALLERGIES: 	  No Known Allergies          MEDICATIONS:  albuterol/ipratropium for Nebulization 3 milliLiter(s) Nebulizer every 6 hours  apixaban 5 milliGRAM(s) Oral every 12 hours  atorvastatin 40 milliGRAM(s) Oral at bedtime  chlorhexidine 2% Cloths 1 Application(s) Topical <User Schedule>  dextrose 5%. 1000 milliLiter(s) IV Continuous <Continuous>  dextrose 5%. 1000 milliLiter(s) IV Continuous <Continuous>  dextrose 50% Injectable 25 Gram(s) IV Push once  dextrose 50% Injectable 12.5 Gram(s) IV Push once  dextrose 50% Injectable 25 Gram(s) IV Push once  dextrose Oral Gel 15 Gram(s) Oral once PRN  digoxin     Tablet 125 MICROGram(s) Oral daily  escitalopram 20 milliGRAM(s) Oral daily  glucagon  Injectable 1 milliGRAM(s) IntraMuscular once  insulin lispro (ADMELOG) corrective regimen sliding scale   SubCutaneous three times a day before meals  insulin lispro (ADMELOG) corrective regimen sliding scale   SubCutaneous at bedtime  letrozole 2.5 milliGRAM(s) Oral daily  metoprolol succinate  milliGRAM(s) Oral every 24 hours  piperacillin/tazobactam IVPB. 4.5 Gram(s) IV Intermittent once  piperacillin/tazobactam IVPB.- 4.5 Gram(s) IV Intermittent once  sacubitril 24 mG/valsartan 26 mG 1 Tablet(s) Oral every 12 hours  sodium chloride 3%  Inhalation 4 milliLiter(s) Inhalation every 8 hours  spironolactone 25 milliGRAM(s) Oral daily  tiotropium 2.5 MICROgram(s) Inhaler 2 Puff(s) Inhalation daily  traZODone 150 milliGRAM(s) Oral at bedtime      PHYSICAL EXAM:  T(C): 36.7 (08-29-23 @ 13:00), Max: 37.8 (08-29-23 @ 00:04)  HR: 65 (08-29-23 @ 13:00) (65 - 131)  BP: 92/56 (08-29-23 @ 13:00) (90/52 - 101/56)  RR: 18 (08-29-23 @ 13:00) (18 - 26)  SpO2: 94% (08-29-23 @ 13:00) (85% - 94%)  Wt(kg): --    GEN: Awake, on NCHF tachypneic   HEENT: + JVD ~ 18  RESP: decreased breath sounds to mid lobes, crackles throughout, ronchi bibasilar   CV: regular, tachycardic, systolic decrescendo murmur.   ABD: Soft, distended   EXT: Warm. 2+ LE edema     I&O's Summary    Height (cm): 160 (08-29 @ 00:10)  Weight (kg): 72.6 (08-29 @ 00:10)  BMI (kg/m2): 28.4 (08-29 @ 00:10)  BSA (m2): 1.76 (08-29 @ 00:10)  LABS:	 	                        15.1   19.37 )-----------( 229      ( 29 Aug 2023 17:46 )             48.0     08-29    136  |  103  |  18  ----------------------------<  224<H>  See Note   |  23  |  0.66    Ca    10.7<H>      29 Aug 2023 17:46  Phos  4.3     08-29  Mg     2.5     08-29    TPro  7.5  /  Alb  4.1  /  TBili  0.5  /  DBili  x   /  AST  See Note  /  ALT  See Note  /  AlkPhos  98  08-29    CARDIAC MARKERS ( 29 Aug 2023 17:46 )  x     / x     / See Note / x     / 2.8 ng/mL    
MENDELOVITS, MIRIAM          MRN-[id number]            (1945)    HPI:  76 yo F w/ afib on eliquis, HTN, DM, PPM, R sided breast ca in the past, bronchiectasis (followed by Dr Hart), CRISTIN on CPAP at night, CHF, presenting with hypoxia. Pt was in usual state of health yesterday, per family and pt  today at 8pm was eating and began coughing and was febrile to 101 and dyspneic with hypoxia to 84% on RA which improved w/ NC, they did home labs w/ WBC 20 and lactate 2.2 so brought her to ED. Pt was recently admitted at [facility] 2 months ago for sepsis 2/2 aspiration pneumonia where she was admitted to the ICU requiring pressors.  Pt was seen in Dr. Hart's office  for follow up for bronchiectasis, complaining of persistent cough for 12 days, she was prescribed a 7 day course of cefdinir and acetylcysteine inhalation  	  In the ED:  Initial vital signs: T: 100.1F, HR: 131, BP: 90/52, R: 26, SpO2: 85% on RA  Labs: significant for WBC 17.05 (83% neutrophil predom), PT/INR 17.6/1.56, Lactate 2.5 >> 2, BUN 33, Glu 214, U/A glu >1000  Imaging:  CXR: no acute cardiopulmonary findings  EKG: afib, , QTc 464  Medications: zosyn 3.375g IV, tylenol 650mg PO, vanc 1.25g IV, LR 1.6L  Consults: none  (29 Aug 2023 02:58)      PAST MEDICAL & SURGICAL HISTORY:  Chronic heart failure      Diabetes mellitus      CRISTIN on CPAP      HTN (hypertension)      Hyperlipidemia      History of permanent cardiac pacemaker placement          FAMILY HISTORY:       SOCIAL HISTORY:   lives with , HHAs assist  ambulates with walker and cane  Denies smoking, drink and drugs.    ROS:    Unable to attain due to:  n/a                     Dyspnea (Roberta 0-10):       0                 N/V (Y/N):                    N          Secretions (Y/N) :         N       Agitation(Y/N): N  Pain (Y/N):     N  -Provocation/Palliation: n/a  -Quality/Quantity:  n/a  -Radiating:  n/a  -Severity:  n/a  -Timing/Frequency:  n/a  -Impact on ADLs: n/a    General:  +weakness   HEENT:    Denied  Neck:  Denied  CVS:  Denied  Resp:  Denied  GI:  Denied  :  Denied  Musc:  Denied  Neuro:  Denied  Psych:  Denied  Skin:  Denied  Lymph:  Denied    ALLERGIES:  Allergies    No Known Allergies    Intolerances        Opiate Naive (Y/N): Y  -iStop reviewed (Y/N): Y (Ref#:      [id number]      )    MEDICATIONS:      MEDICATIONS  (STANDING):  albuterol/ipratropium for Nebulization 3 milliLiter(s) Nebulizer every 6 hours  apixaban 5 milliGRAM(s) Oral every 12 hours  atorvastatin 40 milliGRAM(s) Oral at bedtime  chlorhexidine 2% Cloths 1 Application(s) Topical <User Schedule>  dextrose 5%. 1000 milliLiter(s) (50 mL/Hr) IV Continuous <Continuous>  dextrose 5%. 1000 milliLiter(s) (100 mL/Hr) IV Continuous <Continuous>  dextrose 50% Injectable 25 Gram(s) IV Push once  dextrose 50% Injectable 12.5 Gram(s) IV Push once  dextrose 50% Injectable 25 Gram(s) IV Push once  digoxin     Tablet 125 MICROGram(s) Oral daily  escitalopram 20 milliGRAM(s) Oral daily  furosemide    Tablet 40 milliGRAM(s) Oral every 24 hours  glucagon  Injectable 1 milliGRAM(s) IntraMuscular once  insulin lispro (ADMELOG) corrective regimen sliding scale   SubCutaneous Before meals and at bedtime  letrozole 2.5 milliGRAM(s) Oral daily  metoprolol succinate  milliGRAM(s) Oral every 24 hours  midodrine 5 milliGRAM(s) Oral every 8 hours  piperacillin/tazobactam IVPB.. 4.5 Gram(s) IV Intermittent every 8 hours  polyethylene glycol 3350 17 Gram(s) Oral every 12 hours  sodium chloride 3%  Inhalation 4 milliLiter(s) Inhalation every 8 hours  spironolactone 25 milliGRAM(s) Oral daily  traZODone 150 milliGRAM(s) Oral at bedtime    MEDICATIONS  (PRN):  dextrose Oral Gel 15 Gram(s) Oral once PRN Blood Glucose LESS THAN 70 milliGRAM(s)/deciliter      LABS:    CBC:                        12.2   7.94  )-----------( 156      ( 31 Aug 2023 05:30 )             39.0     CMP:    08    140  |  104  |  13  ----------------------------<  105<H>  3.9   |  28  |  0.66    Ca    9.8      31 Aug 2023 05:30  Phos  3.2       Mg     2.2         TPro  5.5<L>  /  Alb  3.3  /  TBili  0.6  /  DBili  x   /  AST  11  /  ALT  9<L>  /  AlkPhos  62      PT/INR - ( 29 Aug 2023 18:04 )   PT: 12.7 sec;   INR: 1.12          PTT - ( 29 Aug 2023 18:04 )  PTT:32.7 sec  Urinalysis Basic - ( 31 Aug 2023 05:30 )    Color: x / Appearance: x / SG: x / pH: x  Gluc: 105 mg/dL / Ketone: x  / Bili: x / Urobili: x   Blood: x / Protein: x / Nitrite: x   Leuk Esterase: x / RBC: x / WBC x   Sq Epi: x / Non Sq Epi: x / Bacteria: x    IMAGING:    < from: Xray Chest 1 View AP/PA (23 @ 19:06) >    ACC: [id number] EXAM:  XR CHEST AP OR PA 1V   ORDERED BY: KEEGAN BERMEO     PROCEDURE DATE:  2023      IMPRESSION:  Pulmonary venous congestion.    < end of copied text >    PHYSICAL EXAM:  T(C): 36.4 (23 @ 10:13), Max: 37.9 (23 @ 14:27)  HR: 60 (23 @ 08:37) (58 - 64)  BP: 105/56 (23 @ 08:37) (84/51 - 105/56)  RR: 16 (23 @ 08:37) (16 - 40)  SpO2: 94% (23 @ 08:37) (94% - 98%)  Wt(kg): 72.6kG  Daily     Daily   CAPILLARY BLOOD GLUCOSE      POCT Blood Glucose.: 106 mg/dL (31 Aug 2023 06:54)    I&O's Summary    30 Aug 2023 07:01  -  31 Aug 2023 07:00  --------------------------------------------------------  IN: 50 mL / OUT: 1745 mL / NET: -1695 mL      GENERAL:  [ x]Alert  [x]Oriented x 3  [ ]Lethargic due to sedation  [ ]Cachexia [x ]Verbal  [ ]Non-Verbal  Behavioral:   [ ] Anxiety  [ ] Delirium [ ] Agitation [ x] Other - calm  HEENT:  [ ]Normal   [x ]Dry mouth   [ ]ET Tube  [ ]Oral lesions  PULMONARY:   [ ]Clear  [ ]Tachypnea  [ ]Audible excessive secretions   [ ]Rhonchi     [ ]Right [ ]Left [ ]Bilateral  [ ]Crackles        [ ]Right [ ]Left [ ]Bilateral  [ ]Wheezing     [ ]Right [ ]Left [ ]Bilateral  CARDIOVASCULAR:    [x]Regular [ ]Irregular [ ]Tachy  [ ]Jamal [ ]Murmur [ ]Other  GASTROINTESTINAL:  [x ]Soft  [ ]Distended   [ ]+BS  [x ]Non tender [ ]Tender  [ ]PEG [ ]OGT/NGT  [] flexiseal with output  Last BM:   GENITOURINARY:  [x ]Normal [ ] Incontinent   [ ]Oliguria/Anuria   [ ]Ferrer  MUSCULOSKELETAL:   [ ]Normal   [ x]Weakness  [ ]Bed/Wheelchair bound [ ]Edema  NEUROLOGIC:   [x ]No focal deficits  [ ] Cognitive impairment  [ ] Dysphagia [ ]Dysarthria [ ] Paresis [  ]Other   SKIN:   [x ]Normal   [ ]Pressure ulcer(s)  [ ]Rash     Preadmit Karnofsky:  70%           Current Karnofsky:     70%  Cachexia (Y/N):  N  BMI: 28.4kg/m2    ADVANCED DIRECTIVES:     Full Code     No documented HCP form found on [facility]     No Living will / POA / Advance directives found on [place] / [facility].     No documented GOC notes on [facility]    DECISION MAKER: The patient is able to participate in symptomatic assessment and  make complex medical decisions  LEGAL SURROGATE: No documented HCP in paper chart / [place] / [facility].  Alternate surrogate: Hernan Mendelovits [phone]    GOALS OF CARE DISCUSSION:       Palliative care info/counseling provided	           Family meeting       Advanced Directives addressed please see Advance Care Planning Note	           See previous Palliative Medicine Note       Documentation of GOC: 	Full code           REFERRALS:	        Unit SW/Case Mgmt       PT/OT
  Patient is a 77y old  Female who presents with a chief complaint of AHRF (29 Aug 2023 02:58)      HPI:  76 yo F w/ afib on eliquis, HTN, DM, PPM, R sided breast ca in the past, bronchiectasis (followed by Dr Hart), CRISTIN on CPAP at night, CHF, presenting with hypoxia. Pt was in usual state of health yesterday, per family and pt  today at 8pm was eating and began coughing and was febrile to 101 and dyspneic with hypoxia to 84% on RA which improved w/ NC, they did home labs w/ WBC 20 and lactate 2.2 so brought her to ED. Pt was recently admitted at [facility] 2 months ago for sepsis 2/2 aspiration pneumonia where she was admitted to the ICU requiring pressors.  Pt was seen in Dr. Hart's office 8/28 for follow up for bronchiectasis, complaining of persistent cough for 12 days, she was prescribed a 7 day course of cefdinir and acetylcysteine inhalation  	  In the ED:  Initial vital signs: T: 100.1F, HR: 131, BP: 90/52, R: 26, SpO2: 85% on RA  Labs: significant for WBC 17.05 (83% neutrophil predom), PT/INR 17.6/1.56, Lactate 2.5 >> 2, BUN 33, Glu 214, U/A glu >1000  Imaging:  CXR: no acute cardiopulmonary findings  EKG: afib, , QTc 464  Medications: zosyn 3.375g IV, tylenol 650mg PO, vanc 1.25g IV, LR 1.6L  Consults: none  (29 Aug 2023 02:58)    PAST MEDICAL & SURGICAL HISTORY:  Chronic heart failure      Diabetes mellitus      CRISTIN on CPAP      HTN (hypertension)      Hyperlipidemia      History of permanent cardiac pacemaker placement        MEDICATIONS  (STANDING):  acetylcysteine 10%  Inhalation 4 milliLiter(s) Inhalation every 4 hours  apixaban 5 milliGRAM(s) Oral every 12 hours  atorvastatin 40 milliGRAM(s) Oral at bedtime  dextrose 5%. 1000 milliLiter(s) (50 mL/Hr) IV Continuous <Continuous>  dextrose 5%. 1000 milliLiter(s) (100 mL/Hr) IV Continuous <Continuous>  dextrose 50% Injectable 25 Gram(s) IV Push once  dextrose 50% Injectable 12.5 Gram(s) IV Push once  dextrose 50% Injectable 25 Gram(s) IV Push once  digoxin     Tablet 125 MICROGram(s) Oral daily  escitalopram 20 milliGRAM(s) Oral daily  glucagon  Injectable 1 milliGRAM(s) IntraMuscular once  insulin lispro (ADMELOG) corrective regimen sliding scale   SubCutaneous three times a day before meals  insulin lispro (ADMELOG) corrective regimen sliding scale   SubCutaneous at bedtime  letrozole 2.5 milliGRAM(s) Oral daily  metoprolol succinate  milliGRAM(s) Oral every 24 hours  piperacillin/tazobactam IVPB.. 3.375 Gram(s) IV Intermittent every 8 hours  spironolactone 25 milliGRAM(s) Oral daily  tiotropium 2.5 MICROgram(s) Inhaler 2 Puff(s) Inhalation daily  traZODone 150 milliGRAM(s) Oral at bedtime    MEDICATIONS  (PRN):  acetaminophen     Tablet .. 650 milliGRAM(s) Oral every 6 hours PRN Temp greater or equal to 38C (100.4F), Mild Pain (1 - 3)  dextrose Oral Gel 15 Gram(s) Oral once PRN Blood Glucose LESS THAN 70 milliGRAM(s)/deciliter               FAMILY HISTORY:      CBC Full  -  ( 29 Aug 2023 00:42 )  WBC Count : 17.05 K/uL  RBC Count : 4.59 M/uL  Hemoglobin : 13.5 g/dL  Hematocrit : 41.9 %  Platelet Count - Automated : 191 K/uL  Mean Cell Volume : 91.3 fl  Mean Cell Hemoglobin : 29.4 pg  Mean Cell Hemoglobin Concentration : 32.2 gm/dL  Auto Neutrophil # : 14.17 K/uL  Auto Lymphocyte # : 1.65 K/uL  Auto Monocyte # : 0.99 K/uL  Auto Eosinophil # : 0.03 K/uL  Auto Basophil # : 0.06 K/uL  Auto Neutrophil % : 83.0 %  Auto Lymphocyte % : 9.7 %  Auto Monocyte % : 5.8 %  Auto Eosinophil % : 0.2 %  Auto Basophil % : 0.4 %      08-29    137  |  99  |  33<H>  ----------------------------<  214<H>  4.5   |  24  |  0.86    Ca    10.4      29 Aug 2023 00:42    TPro  6.8  /  Alb  4.2  /  TBili  0.4  /  DBili  x   /  AST  13  /  ALT  13  /  AlkPhos  101  08-29      Urinalysis Basic - ( 29 Aug 2023 02:18 )    Color: Yellow / Appearance: Clear / SG: <=1.005 / pH: x  Gluc: x / Ketone: NEGATIVE  / Bili: Negative / Urobili: 0.2 E.U./dL   Blood: x / Protein: NEGATIVE mg/dL / Nitrite: NEGATIVE   Leuk Esterase: NEGATIVE / RBC: x / WBC x   Sq Epi: x / Non Sq Epi: x / Bacteria: x        Radiology :     < from: Xray Chest 1 View- PORTABLE-Urgent (08.29.23 @ 01:18) >    ACC: [id number] EXAM:  XR CHEST PORTABLE URGENT 1V   ORDERED BY: ALEXANDRIA NGUYỄN     PROCEDURE DATE:  08/29/2023          INTERPRETATION:  Portable chest    HISTORY: Sepsis    IMPRESSION:    No lung infiltrate lung consolidation pleural effusionor pneumothorax.   Cardiomegaly and left-sided implanted cardiac device. Vascular   calcification thoracic aorta. Minimal right lung base focal atelectasis.                Review of Systems : per HPI         Vital Signs Last 24 Hrs  T(C): 36.9 (29 Aug 2023 05:44), Max: 37.8 (29 Aug 2023 00:04)  T(F): 98.5 (29 Aug 2023 05:44), Max: 100.1 (29 Aug 2023 00:04)  HR: 75 (29 Aug 2023 05:44) (68 - 131)  BP: 101/55 (29 Aug 2023 05:44) (90/52 - 101/56)  BP(mean): --  RR: 18 (29 Aug 2023 04:12) (18 - 26)  SpO2: 92% (29 Aug 2023 04:12) (85% - 94%)    Parameters below as of 29 Aug 2023 04:12  Patient On (Oxygen Delivery Method): nasal cannula  O2 Flow (L/min): 2          Physical Exam :  obese 77 y o woman lying comfortably in semi Butts's position , awake , alert , no acute complaints , feels tired     Head : normocephalic , atraumatic    Eyes : PERRLA , EOMI , no nystagmus , sclera anicteric    ENT / FACE : neg nasal discharge , uvula midline , no oropharyngeal erythema / exudate    Neck : supple , negative JVD , negative carotid bruits , no thyromegaly    Chest : coarse bs     Cardiovascular : regular rate and rhythm , neg murmurs / rubs / gallops    Abdomen : soft , obese , non tender to palpation in all 4 quadrants ,  normal bowel sounds     Extremities : WWP , neg cyanosis /clubbing / edema     Neurologic Exam :    Alert and oriented  x 3     Motor Exam:          Right UE:               no focal weakness ,  > 3+/5 throughout     Left UE:                 no focal weakness ,  > 3+/5 throughout        Right LE:    no focal weakness ,  > 3+/5 throughout        Left LE:    no focal weakness ,  > 3+/5 throughout           Sensation :         intact to light touch x 4 extremities     DTR :     biceps/brachioradialis : equal                      patella/ankle : equal        Gait :  not tested          PM&R Impression :     admitted for AHRF and severe sepsis likely 2/2 to bronchiectasis exacerbation vs aspiration pneumonitis     - deconditioned    - no focal weakness          Recommendations / Plan :      1) Physical / Occupational therapy focusing on therapeutic exercises , equipment evaluation , bed mobility/transfer out of bed evaluation , progressive ambulation with assistive devices prn .    2) Current disposition plan recommendation  :    pending functional progress

## 2023-08-31 NOTE — PROGRESS NOTE ADULT - ASSESSMENT
76 yo F w/ afib on eliquis, HTN, NIDDM type 2, HFrEF (EF 30-35%), BiV AICD (medtronic, 2011), R sided breast ca in the past, bronchiectasis (followed by Dr Hart), CRISTIN on CPAP at night, presenting with hypoxia. Pt was in usual state of health yesterday, per family and pt  today at 8pm was eating and began coughing and was febrile to 101 and dyspneic with hypoxia to 84% on RA which improved w/ NC, they did home labs w/ WBC 20 and lactate 2.2 so brought her to ED. Pt was recently admitted at [facility] 2 months ago for sepsis 2/2 aspiration pneumonia where she was admitted to the ICU requiring pressors. Admitted for AHRF and severe sepsis likely 2/2 to bronchiectasis exacerbation vs aspiration pneumonitis. RRT called 8/29 for acute worsening of respiratory status in a setting of fluid overload. Cardiology consulted for volume optimization.     Review of studies:  EKG: Sinus tachycardia, 1st degree AV block, atypical LBBB  TTE (6/2022): EF 20-25% with global hypokinesis, dilated LA, moderate MR, PASP 36  TTE (7/2022): mod-sev reduced LVSF, EF 30-35%, septal wall thickness, mildly dilated RV and reduced RVSF, biatrial enlargement, mild-mod AR/TR, mod MR, pulm HTN PASP 42, borderline dilated aortic root, similar compared to prior.    #AHRF 2/2 aspiration pneumonia and flash pulmonary edema w/ HFrEF, VHD and moderate MR  - s/p 80 mg IV lasix with 4.7 L UOP  - cw lasix 40 mg PO daily   - continue Toprol XL 100mg   - hold Entresto and aldactone due to borderline BP with plan to introduce as tolerated     #HFrEF (EF 30-35%)  - cw toprol  mg daily  - cw digoxin 125 mcg daily  - follows with Dr. Dickinson at [facility]  - s/p BiV AICD (medtronic, 2011)    #chronic afib  - [id number]   - on Eliquis at home  - continue with anti-coagulation      Please see attending attestation for final recommendations  76 yo F w/ afib on eliquis, HTN, NIDDM type 2, HFrEF (EF 30-35%), BiV AICD (medtronic, 2011), R sided breast ca in the past, bronchiectasis (followed by Dr Hart), CRISTIN on CPAP at night, presenting with hypoxia. Pt was in usual state of health yesterday, per family and pt  today at 8pm was eating and began coughing and was febrile to 101 and dyspneic with hypoxia to 84% on RA which improved w/ NC, they did home labs w/ WBC 20 and lactate 2.2 so brought her to ED. Pt was recently admitted at [facility] 2 months ago for sepsis 2/2 aspiration pneumonia where she was admitted to the ICU requiring pressors. Admitted for AHRF and severe sepsis likely 2/2 to bronchiectasis exacerbation vs aspiration pneumonitis. RRT called 8/29 for acute worsening of respiratory status in a setting of fluid overload. Cardiology consulted for volume optimization.     Review of studies:  EKG: Sinus tachycardia, 1st degree AV block, atypical LBBB  TTE (6/2022): EF 20-25% with global hypokinesis, dilated LA, moderate MR, PASP 36  TTE (7/2022): mod-sev reduced LVSF, EF 30-35%, septal wall thickness, mildly dilated RV and reduced RVSF, biatrial enlargement, mild-mod AR/TR, mod MR, pulm HTN PASP 42, borderline dilated aortic root, similar compared to prior.    #AHRF 2/2 aspiration pneumonia and flash pulmonary edema w/ HFrEF, VHD and moderate MR  - s/p 80 mg IV lasix with 4.7 L UOP  - recommend transition to lasix 40 mg PO daily, if concern for hypotension can give Lasix 20 mg BID  - continue Toprol XL 100mg and aldactone  - hold Entresto due to borderline BP with plan to introduce as tolerated     #HFrEF (EF 30-35%)  - cw toprol  mg daily  - cw digoxin 125 mcg daily  - follows with Dr. Dickinson at [facility]  - s/p BiV AICD (medtronic, 2011)    #chronic afib  - [id number]   - on Eliquis at home  - continue with anti-coagulation      Please see attending attestation for final recommendations.

## 2023-08-31 NOTE — PROGRESS NOTE ADULT - PROBLEM SELECTOR PLAN 11
F: s/p LR 1.6L  E: replete: K<4, Mg<2  N: DASH -  Kosher  D: Eliquis 5mg BID    Dispo: [facility]  Code:Full
F: s/p LR 1.6L  E: replete: K<4, Mg<2  N: DASH -  Kosher  D: Eliquis 5mg BID    Dispo: [facility] -- MICU  Code:Full
F: s/p LR 1.6L  E: replete: K<4, Mg<2  N: DASH -  Kosher  D: Eliquis 5mg BID    Dispo: [facility]  Code:Full

## 2023-08-31 NOTE — CONSULT NOTE ADULT - PROBLEM SELECTOR RECOMMENDATION 5
Patient remains full code Role of geriatrics and palliative was introduced, attempted to discuss advanced care planning but family refused.   As discussed during the palliative IDT meeting and as identified during the patients PSSA screening the patient would benefit from: Chaplanicy  - Baptist/Spiritual practice: Baptist   - Coping: [x ] well [ ] with difficulty [ ] poor coping   - Support system: [x ] strong [ ] adequate [ ] inadequate  - All questions answered, emotional support provided  -  primary team   - Please contact Palliative Medicine 24/7 at [phone] for any acute symptoms or further questions  Palliative care will sign off at this time. Please reconsult as needed.

## 2023-08-31 NOTE — PROGRESS NOTE ADULT - TIME BILLING
The patient is slowly improving.  The patient had no episode of pulmonary edema.  The patient is to be out of bed in chair.  I decreased the oxygen.  DC the Ferrer.  Continue diuretics.  Patient is hemodynamically stable.  Will finish 7 days of IV antibiotic.  I discussed with the caring physician in the community.  We will discharge the patient tomorrow with home oxygen and will wean as tolerated  Patient seen and examined with house-staff during bedside rounds.  Resident note read, including vitals, physical findings, laboratory data, and radiological reports.   Revisions included below.  Direct personal management at bed side and extensive interpretation of the data.  Plan was outlined and discussed in details with the housestaff.  Decision making of high complexity  Action taken for acute disease activity to reflect the level of care provided:  - medication reconciliation  - review laboratory data

## 2023-08-31 NOTE — CONSULT NOTE ADULT - PROBLEM SELECTOR RECOMMENDATION 4
Patient with a history of heart failure. Continue spironolactone 25mg, Toprol XL 100mg, entresto 24/26 and 49/51. Continue care as per primary team.

## 2023-08-31 NOTE — PROGRESS NOTE ADULT - PROBLEM SELECTOR PLAN 6
Home meds: Jardiance 10mg qd    Plan:  - ISS  - Consistent Carb diet

## 2023-08-31 NOTE — PROGRESS NOTE ADULT - SUBJECTIVE AND OBJECTIVE BOX
***INCOMPLETE NOTE***    INTERVAL EVENTS:   No acute events overnight.    SUBJECTIVE / INTERVAL HPI:   Patient seen and examined at bedside. Condition largely unchanged from yesterday. No acute complaints at this time.    ROS: negative unless otherwise stated above.    PHYSICAL EXAM: Noted in "Physical Exam" section below.    VITAL SIGNS:  Vital Signs Last 24 Hrs  T(C): 36.7 (31 Aug 2023 14:37), Max: 37 (31 Aug 2023 07:00)  T(F): 98 (31 Aug 2023 14:37), Max: 98.6 (31 Aug 2023 07:00)  HR: 60 (31 Aug 2023 16:05) (58 - 64)  BP: 96/51 (31 Aug 2023 16:05) (89/50 - 105/56)  BP(mean): 70 (31 Aug 2023 16:05) (64 - 75)  RR: 18 (31 Aug 2023 16:05) (15 - 20)  SpO2: 94% (31 Aug 2023 16:05) (94% - 98%)    Parameters below as of 31 Aug 2023 16:05  Patient On (Oxygen Delivery Method): nasal cannula w/ humidification  O2 Flow (L/min): 4        08-30-23 @ 07:01  -  08-31-23 @ 07:00  --------------------------------------------------------  IN: 50 mL / OUT: 1745 mL / NET: -1695 mL    08-31-23 @ 07:01  - 08-31-23 @ 17:28  --------------------------------------------------------  IN: 340 mL / OUT: 650 mL / NET: -310 mL        MEDICATIONS:  MEDICATIONS  (STANDING):  albuterol/ipratropium for Nebulization 3 milliLiter(s) Nebulizer every 6 hours  apixaban 5 milliGRAM(s) Oral every 12 hours  atorvastatin 40 milliGRAM(s) Oral at bedtime  chlorhexidine 2% Cloths 1 Application(s) Topical <User Schedule>  dextrose 5%. 1000 milliLiter(s) (50 mL/Hr) IV Continuous <Continuous>  dextrose 5%. 1000 milliLiter(s) (100 mL/Hr) IV Continuous <Continuous>  dextrose 50% Injectable 25 Gram(s) IV Push once  dextrose 50% Injectable 25 Gram(s) IV Push once  dextrose 50% Injectable 12.5 Gram(s) IV Push once  digoxin     Tablet 125 MICROGram(s) Oral daily  escitalopram 20 milliGRAM(s) Oral daily  furosemide    Tablet 40 milliGRAM(s) Oral every 24 hours  glucagon  Injectable 1 milliGRAM(s) IntraMuscular once  insulin lispro (ADMELOG) corrective regimen sliding scale   SubCutaneous Before meals and at bedtime  letrozole 2.5 milliGRAM(s) Oral daily  metoprolol succinate  milliGRAM(s) Oral every 24 hours  midodrine 5 milliGRAM(s) Oral every 8 hours  piperacillin/tazobactam IVPB.. 4.5 Gram(s) IV Intermittent every 8 hours  polyethylene glycol 3350 17 Gram(s) Oral every 12 hours  sodium chloride 3%  Inhalation 4 milliLiter(s) Inhalation every 8 hours  spironolactone 25 milliGRAM(s) Oral daily  traZODone 150 milliGRAM(s) Oral at bedtime    MEDICATIONS  (PRN):  dextrose Oral Gel 15 Gram(s) Oral once PRN Blood Glucose LESS THAN 70 milliGRAM(s)/deciliter      ALLERGIES:  Allergies    No Known Allergies    Intolerances        LABS:                        12.2   7.94  )-----------( 156      ( 31 Aug 2023 05:30 )             39.0     08-31    140  |  104  |  13  ----------------------------<  105<H>  3.9   |  28  |  0.66    Ca    9.8      31 Aug 2023 05:30  Phos  3.2     08-31  Mg     2.2     08-31    TPro  5.5<L>  /  Alb  3.3  /  TBili  0.6  /  DBili  x   /  AST  11  /  ALT  9<L>  /  AlkPhos  62  08-31    PT/INR - ( 29 Aug 2023 18:04 )   PT: 12.7 sec;   INR: 1.12          PTT - ( 29 Aug 2023 18:04 )  PTT:32.7 sec  Urinalysis Basic - ( 31 Aug 2023 05:30 )    Color: x / Appearance: x / SG: x / pH: x  Gluc: 105 mg/dL / Ketone: x  / Bili: x / Urobili: x   Blood: x / Protein: x / Nitrite: x   Leuk Esterase: x / RBC: x / WBC x   Sq Epi: x / Non Sq Epi: x / Bacteria: x      CAPILLARY BLOOD GLUCOSE      POCT Blood Glucose.: 262 mg/dL (31 Aug 2023 16:33)      RADIOLOGY & ADDITIONAL TESTS: Reviewed.

## 2023-08-31 NOTE — PROGRESS NOTE ADULT - PROBLEM SELECTOR PLAN 7
Home meds: atorvastatin 40mg qhs    Plan:  - Cont home meds

## 2023-08-31 NOTE — CONSULT NOTE ADULT - PROBLEM SELECTOR RECOMMENDATION 2
Per chart review patient was noted to be hypoxic at home and had a recent hospitalization for PNA at [facility]. Patient is currently on Zosyn for the next 7 days. Continue care as per primary team.

## 2023-08-31 NOTE — PROGRESS NOTE ADULT - PROBLEM SELECTOR PLAN 1
Pt presenting to the ED with hypoxia, tachycardia, tachypnea and hypoxia with leukocytosis to 20 and lactate to 2.5 now cleared, following aspiration event evening of 8/28. Pt seen o/p, 8/28 to f/u for recent persistent cough. Source bronchiectasias exacerbation vs aspiration pneumonitis, CXR w/o consolidation or effusions, however remains hypoxic.      Plan:  - Cont Zosyn 3.375g IV q8hrs (patient recently hospitalized 2 months ago for aspiration pneumonia)  - F/U BCx. Obtain sputum Cx  - MRSA swab  - rest of management as listed below
Pt presenting to the ED with hypoxia, tachycardia, tachynea and hypoxia with leukocytosis to 20 and lactate to 2.5 now cleared, following aspiration event evening of 8/28. Pt seen o/p, 8/28 to f/u for recent persistent cough. Source bronchiectasias exacerbation vs aspiration pneumonitis, CXR w/o consolidation or effusions, however remains hypoxic.      Plan:  - Cont Zosyn 3.375g IV q8hrs for 7 days  - F/U BCx and sputum Cx
Pt presenting to the ED with hypoxia, tachycardia, tachynea and hypoxia with leukocytosis to 20 and lactate to 2.5 now cleared, following aspiration event evening of 8/28. Pt seen o/p, 8/28 to f/u for recent persistent cough. Source bronchiectasias exacerbation vs aspiration pneumonitis, CXR w/o consolidation or effusions, however remains hypoxic.      Plan:  - Cont Zosyn 3.375g IV q8hrs (day 3) for 4 days  - F/U final BCx and sputum Cx

## 2023-08-31 NOTE — DISCHARGE NOTE PROVIDER - PROVIDER TOKENS
PROVIDER:[TOKEN:[[id number]],FOLLOWUP:[2 weeks],ESTABLISHEDPATIENT:[T]] PROVIDER:[TOKEN:[[id number]],FOLLOWUP:[2 weeks],ESTABLISHEDPATIENT:[T]],PROVIDER:[TOKEN:[[id number]],SCHEDULEDAPPT:[09/01/2023],SCHEDULEDAPPTTIME:[02:00 PM],ESTABLISHEDPATIENT:[T]] PROVIDER:[TOKEN:[[id number]],SCHEDULEDAPPT:[09/01/2023],SCHEDULEDAPPTTIME:[02:00 PM],ESTABLISHEDPATIENT:[T]],FREE:[LAST:[Latisha],FIRST:[Clayton],PHONE:[phone]],FAX:[(   )    -],ADDRESS:[Internal Medicine  [address] [place] [address]],SCHEDULEDAPPT:[09/08/2023],SCHEDULEDAPPTTIME:[11:00 AM],ESTABLISHEDPATIENT:[T]]

## 2023-08-31 NOTE — PROGRESS NOTE ADULT - PROBLEM SELECTOR PLAN 2
Pt reportedly hypoxic to 84% prior to presentation, on intermittent supplemental O2 at home. Witnessed aspiration event likely precipitating hypoxia. Course complicated by rapid response on 8/29 for acute episode of tachypnea and hypoxia, suspected to be in setting of flash pulmonary edema (patient found to be hypertensive at time of episode)    - Maintain aspiration precautions  - Cont acetylcysteine inhalation  - cont spiriva inhaler  - c/w BP management as listed below
Pt reportedly hypoxic to 84% prior to presentation, on intermittent supplemental O2 at home. Witnessed aspiration event likely precipitating hypoxia    Plan:  - Wean O2 as tolerate  - Maintain aspiration precautions  - cont duoneb q6  - encourage IS
Pt reportedly hypoxic to 84% prior to presentation, on intermittent supplemental O2 at home. Witnessed aspiration event likely precipitating hypoxia    Plan:  - Start Lasix 40mg po qd  - Wean O2 as tolerated  - Maintain aspiration precautions  - cont duoneb q6  - encourage IS

## 2023-08-31 NOTE — CONSULT NOTE ADULT - PROBLEM/RECOMMENDATION-1
ONCOLOGY FOLLOW UP NOTE    Date of Service:  12/27/2022    Chief Complaint: Esophageal cancer    Oncology History:    April 2021:  CT lung screening mentioned esophageal thickening.  Recommended to undergo GI evaluation.  Patient declined.  July 2021:  Started having dysphagia.  September 2021:  Evaluated by primary care physician.  CT scan chest abdomen pelvis revealed esophageal mass and suspicious looking celiac lymph nodes.  EGD/EUS reveals distal esophageal/GE junction mass, biopsy consistent with adenocarcinoma.  Suspicious N2 celiac lymph nodes noted.  Endoscopic FNA shows atypical cells.  November 2021:  Started neoadjuvant chemoradiation therapy on clinical trial (Protocol: [id number]:  A Phase II/III Study of Brandy-operative Nivolumab and Ipilimumab in Patients with Locoregional Esophageal and Gastroesophageal Junction Adenocarcinoma) -- randomized to chemotherapy only arm with carbo/paclitaxel.  February 2022: Surgical resection reveals isolated tumor cells within the primary site.  Lymph nodes are negative.  Awaiting randomization between nivolumab versus combination nivolumab/ipilimumab.  Subsequently lost insurance coverage.  Patient then decided to withdraw consent from the clinical trial.  May 2022: Started standard of care adjuvant nivolumab for one year starting on 5/17/2022.     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  Staging CT shows incidental PE.  Started on anticoagulation.    Interval History:  Patient reports to the clinic for ongoing management of his esophageal cancer.  He reports feeling pretty good.  He did not sleep well last night, but other than that he is doing okay.  No mouth sores. No shortness of breath or chest pain. He denies any palpitations.            Review of Systems:  Review of systems reviewed and agree as documented in EPIC per medical assistant.    Physical Exam:  Vitals:    Visit Vitals  /84   Pulse (!) 128   Temp 98.3 °F (36.8 °C) (Temporal)   Resp 16   Wt  70.8 kg (156 lb 1.4 oz)   SpO2 96%   BMI 21.17 kg/m²     NECK:  Supple without masses or thyromegaly.  No jugular venous distension.   HEMATOLOGIC/LYMPHATIC:  No petechiae or purpura.    RESPIRATORY:  Lungs are clear to auscultation without rhonchi or wheezing.   CARDIOVASCULAR:  Tachycardic- rate 116. No murmurs, gallops or rubs.   CHEST:  Chest is symmetric, without chest wall deformities.   ABDOMEN:  Nontender, nondistended, no masses, ascites or hepatosplenomegaly.  Good bowel sounds.  No guarding or rebound tenderness.  No pulsatile masses.   BACK/SPINE:  No kyphosis, scoliosis, compression fractures.  Nontender to palpation.   MUSCULOSKELETAL:  No tenderness or swelling, normal range of motion without obvious weakness.   EXTREMITIES:  No visible deformities, no cyanosis, clubbing or edema.    INTEGUMENTARY:  No rashes, scars, or lesions suggestive of malignancy.    Weight:  Wt Readings from Last 10 Encounters:   12/12/22 71 kg (156 lb 9.6 oz)   11/28/22 70.5 kg (155 lb 6.4 oz)   10/28/22 72.6 kg (160 lb)   10/24/22 68.5 kg (150 lb 14.5 oz)   09/12/22 70.4 kg (155 lb 3.2 oz)   09/08/22 70.2 kg (154 lb 12.2 oz)   08/29/22 68 kg (150 lb)   08/18/22 67.8 kg (149 lb 8 oz)   08/16/22 67.1 kg (147 lb 14.4 oz)   08/01/22 67 kg (147 lb 11.2 oz)       Imaging:  CT Chest 12/19/22  IMPRESSION:  1.  New solid pulmonary nodule in the RIGHT upper lobe, indeterminant, though raising the possibility of metastatic disease.  2.  Diffuse hepatic steatosis, with no focal liver lesions.  3.  Numerous jejunal intussusceptions, likely transient.  This does not produce obstruction.  Read by Dr. Rylee Cohenback      Labs:  Recent Labs   Lab 12/12/22  0907 11/28/22  0939 10/24/22  1056 09/12/22  0923 09/06/22  0442   WBC 4.2 5.6 5.4 7.6 6.1   RBC 3.55* 3.32* 3.33* 3.69* 3.58*   HGB 12.8* 12.1* 12.1* 13.1 12.6*   HCT 37.7* 35.8* 34.7* 37.8* 36.4*   .2* 107.8* 104.2* 102.4* 101.7*    172 209 277 200   Absolute  Neutrophils 2.6 4.2 3.9 5.6 4.2   Absolute Lymphocytes 1.0 0.7* 0.8* 0.9* 0.8*   Absolute Monocytes 0.5 0.5 0.6 0.9 0.9   Absolute Eosinophils  0.1 0.0 0.0 0.1 0.1   Absolute Basophils 0.0 0.0 0.1 0.1 0.0     Recent Labs   Lab 12/12/22  0907 11/28/22  0939 10/28/22  1057 10/24/22  1056 09/12/22  0923 09/05/22  1852 08/29/22  0911 08/16/22  0918 08/01/22  0848   Glucose 86 110* 80 103* 89   < > 113* 92 98   Sodium 138 143 138 140 142   < > 136 142 141   Potassium 3.9 4.0 4.0 2.3* 3.4   < > 4.1 4.0 4.0   Chloride 101 105 102 97 105   < > 98 103 103   BUN 5* 6 5* 2* 2*   < > 8 5* 3*   Creatinine 0.54* 0.75 0.49* 0.58* 0.68   < > 0.52* 0.57* 0.55*   Calcium 8.2* 7.8* 7.9* 7.6* 8.4   < > 8.7 8.2* 7.6*   Magnesium  --   --   --  1.7 1.5*  --  1.7 1.7 1.8   Albumin 2.4* 2.0* 1.7* 1.7* 2.5*   < > 2.8* 2.6* 2.3*   GOT/AST 66* 82* 102* 103* 27   < > 106* 49* 65*   Alkaline Phosphatase 171* 198* 393* 447* 126*   < > 206* 177* 146*   GPT 84* 79* 74* 72* 36   < > 75* 70* 51    < > = values in this interval not displayed.     Recent Labs   Lab 12/12/22  0907 11/28/22  0939 10/28/22  1057 10/24/22  1056 09/12/22  0923 08/01/22  0848 07/25/22  0847 06/14/22  0938 05/31/22  0911 05/17/22  1031   Anion Gap 10 12 5* 12 10   < > 11   < > 12 11   Globulin 3.6 3.4 3.4 3.5 3.6   < > 3.8   < > 3.9 4.1*   LD, Total  --   --   --   --   --   --  175  --  214 213    < > = values in this interval not displayed.       Advance Directive:  No    ASSESSMENT:    1. 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  Staging PET shows loco-regional disease. Completed chemo/XRT on 12/23/21. Post treatment PET on 01/25/22 shows no evidence of progression.  Surgical resection 2/8/2022.  Pathology revealed rare microscopic foci of residual adenocarcinoma up to 0.2 cm.  Yp T1b, ypN0.  Adjuvant nivolumab started 5/17/2022.  2. History of recurrent thromboembolism, pre-treatment staging CT shows incidental pulmonary  embolism. History of right lower extremity deep vein thrombosis.  Patient started on anticoagulation with Lovenox, transitioned to Xarelto.  3. Oral candidiasis:  Advised baking soda rinse.  Diflucan courses as needed. Resolved.   4. Immune mediated transaminitis and colitis.   Transaminitis could also be secondary to alcohol abuse  5.  Diarrhea: Likely secondary to immune mediated colitis.  He has been on a Prednisone taper and is currently on 15 mg daily.   (Decreased from 20 mg to 15 mg on 12/12).    6.  Alcohol abuse  7.  Tachycardia     PLAN:    1. Neoadjuvant chemotherapy with radiation therapy on clinical trial, randomized to the chemotherapy only arm, cycle 5/5, completed on 12/20/21, last dose of XRT on 12/23/21.    2. Post treatment PET-CT scan performed 1/25/2022 did not reveal any progression.  Surgical resection on 2/8/2022  3. On adjuvant nivolumab started 5/17/2022.  Cycle 4 delayed due to elevated liver enzymes and ongoing diarrhea.  He resumed nivolumab on 10/24/22  4. Elevated liver enzymes:  CT scan is negative for metastatic disease.  Immune therapy related.  He continues to consume alcohol.    5. Diarrhea: Likely secondary to immune mediated colitis.  He has been on a Prednisone taper and is currently on 15 mg daily.  (Decreased from 20 mg to 15 mg on 12/12).  Utilize Imodium p.r.n.  6. Nutrition consult and supportive care  7. Anticoagulation -- on Xarelto  8. Tachycardia- asymptomatic. Will give extra IVF today; if persists will obtain EKG.           FOLLOW UP  1. Nivolumab and fluids today  2. MD, lab (cmp, cbc, mag, tsh), Nivo in 2 weeks   3. Continue Prednisone to 15 mg p.o. daily.  Refill not needed today.   4. Reviewed with Dr. Morrissey.  Will refer to IR for lung biopsy.  If IR unable to biopsy, will refer to pulmonology.    5.  Will ask RN to repeat heart rate following normal saline bolus.     The patient indicated understanding of the diagnosis and agreed with the plan of care.  Patient  is encouraged to call for any interval symptoms or concerns such as fever greater than 100.5, vomiting, constipation or diarrhea, bleeding, or weakness.  Patient verbalizes and is in agreement with recommended plan of care.  Patient was seen independently.     CLAUDIA Alfaro         DISPLAY PLAN FREE TEXT

## 2023-08-31 NOTE — DISCHARGE NOTE PROVIDER - NSDCFUADDAPPT_GEN_ALL_CORE_FT
Please bring your Insurance card, Photo ID and Discharge paperwork to the following appointment:    (1) Please follow up with your Primary Care Provider, Dr. Luz Good at [address] [place] [address] on 09/08/2023 at 11:00am.    Appointment was scheduled by Ms. LISA Padilla, Referral Coordinator.

## 2023-08-31 NOTE — PROGRESS NOTE ADULT - PROBLEM SELECTOR PLAN 3
Pt presenting following choking event, noted to be hypoxic at home. Recent hospitilization at [facility] for aspiration PNA, requiring pressors in ICU. Pt has hx of tracheobronchomalacia, barium swallow 8/2 w/ findings of calcifications on laryngeal cartilage. Treating with Zosyn chuck severe sepsis   -per S&S no concern for aspiration, patient recently had barium swallow done  - Aspiration precautions
Pt presenting following choking event, noted to be hypoxic at home. Recent hospitilization at [facility] for aspiration PNA, requiring pressors in ICU. Pt has hx of tracheobronchomalacia, barium swallow 8/2 w/ findings of calcifications on laryngeal cartilage. Treating with Zosyn iso severe sepsis     Plan:  - NPO pending S &S assessment  - F/U Speech and swallow  - Aspiration precautions
Pt presenting following choking event, noted to be hypoxic at home. Recent hospitilization at [facility] for aspiration PNA, requiring pressors in ICU. Pt has hx of tracheobronchomalacia, barium swallow 8/2 w/ findings of calcifications on laryngeal cartilage. Treating with Zosyn iso severe sepsis     Plan:  - Passed S&S assessment - on regular diet  - Aspiration precautions

## 2023-08-31 NOTE — DISCHARGE NOTE PROVIDER - NSDCMRMEDTOKEN_GEN_ALL_CORE_FT
atorvastatin 40 mg oral tablet: 1 tab(s) orally once a day (at bedtime)  digoxin 125 mcg (0.125 mg) oral tablet: 1 tab(s) orally once a day  Entresto 24 mg-26 mg oral tablet: 1 orally 2 times a day  escitalopram 10 mg oral tablet: 2 tab(s) orally once a day  furosemide 20 mg oral tablet: 1 tab(s) orally once a day  Jardiance 10 mg oral tablet: 1 tab(s) orally once a day  letrozole 2.5 mg oral tablet: 1 tab(s) orally once a day  metFORMIN 500 mg oral tablet: 1 tab(s) orally once a day  Spiriva Respimat 60 ACT 2.5 mcg/inh inhalation aerosol: 2 puff(s) inhaled once a day  spironolactone 25 mg oral tablet: 1 tab(s) orally once a day  Toprol- mg oral tablet, extended release: 1 tab(s) orally once a day  traZODone 150 mg oral tablet: 1 tab(s) orally once a day (at bedtime)   atorvastatin 40 mg oral tablet: 1 tab(s) orally once a day (at bedtime)  digoxin 125 mcg (0.125 mg) oral tablet: 1 tab(s) orally once a day  escitalopram 10 mg oral tablet: 2 tab(s) orally once a day  furosemide 40 mg oral tablet: 1 tab(s) orally every 24 hours  Jardiance 10 mg oral tablet: 1 tab(s) orally once a day  letrozole 2.5 mg oral tablet: 1 tab(s) orally once a day  metFORMIN 500 mg oral tablet: 1 tab(s) orally once a day  midodrine 5 mg oral tablet: 1 tab(s) orally every 8 hours  Spiriva Respimat 60 ACT 2.5 mcg/inh inhalation aerosol: 2 puff(s) inhaled once a day  spironolactone 25 mg oral tablet: 1 tab(s) orally once a day  Toprol- mg oral tablet, extended release: 1 tab(s) orally once a day  traZODone 150 mg oral tablet: 1 tab(s) orally once a day (at bedtime)   atorvastatin 40 mg oral tablet: 1 tab(s) orally once a day (at bedtime)  cefpodoxime 200 mg oral tablet: 1 tab(s) orally every 12 hours  digoxin 125 mcg (0.125 mg) oral tablet: 1 tab(s) orally once a day  escitalopram 10 mg oral tablet: 2 tab(s) orally once a day  furosemide 40 mg oral tablet: 1 tab(s) orally every 24 hours  Jardiance 10 mg oral tablet: 1 tab(s) orally once a day  letrozole 2.5 mg oral tablet: 1 tab(s) orally once a day  metFORMIN 500 mg oral tablet: 1 tab(s) orally once a day  midodrine 5 mg oral tablet: 1 tab(s) orally every 8 hours  Spiriva Respimat 60 ACT 2.5 mcg/inh inhalation aerosol: 2 puff(s) inhaled once a day  spironolactone 25 mg oral tablet: 1 tab(s) orally once a day  Toprol- mg oral tablet, extended release: 1 tab(s) orally once a day  traZODone 150 mg oral tablet: 1 tab(s) orally once a day (at bedtime)

## 2023-08-31 NOTE — PROGRESS NOTE ADULT - PROBLEM SELECTOR PLAN 5
Home meds: spironolactone 25mg, Toprol XL 100mg, entresto 24/26 BID    Plan:  - Continue home meds
Home meds: spironolactone 25mg, Toprol XL 100mg, entresto 24/26 and 49/51    Plan:  - Continue home meds
Home meds: spironolactone 25mg, Toprol XL 100mg, entresto 24/26 and 49/51    Plan:  - Continue home meds

## 2023-08-31 NOTE — PROGRESS NOTE ADULT - SUBJECTIVE AND OBJECTIVE BOX
Physical Medicine and Rehabilitation Progress Note :       Patient is a 77y old  Female who presents with a chief complaint of AHRF (31 Aug 2023 11:07)      HPI:  78 yo F w/ afib on eliquis, HTN, DM, PPM, R sided breast ca in the past, bronchiectasis (followed by Dr Hart), CRISTIN on CPAP at night, CHF, presenting with hypoxia. Pt was in usual state of health yesterday, per family and pt  today at 8pm was eating and began coughing and was febrile to 101 and dyspneic with hypoxia to 84% on RA which improved w/ NC, they did home labs w/ WBC 20 and lactate 2.2 so brought her to ED. Pt was recently admitted at [facility] 2 months ago for sepsis 2/2 aspiration pneumonia where she was admitted to the ICU requiring pressors.  Pt was seen in Dr. Hart's office 8/28 for follow up for bronchiectasis, complaining of persistent cough for 12 days, she was prescribed a 7 day course of cefdinir and acetylcysteine inhalation  	  In the ED:  Initial vital signs: T: 100.1F, HR: 131, BP: 90/52, R: 26, SpO2: 85% on RA  Labs: significant for WBC 17.05 (83% neutrophil predom), PT/INR 17.6/1.56, Lactate 2.5 >> 2, BUN 33, Glu 214, U/A glu >1000  Imaging:  CXR: no acute cardiopulmonary findings  EKG: afib, , QTc 464  Medications: zosyn 3.375g IV, tylenol 650mg PO, vanc 1.25g IV, LR 1.6L  Consults: none  (29 Aug 2023 02:58)                            12.2   7.94  )-----------( 156      ( 31 Aug 2023 05:30 )             39.0       08-31    140  |  104  |  13  ----------------------------<  105<H>  3.9   |  28  |  0.66    Ca    9.8      31 Aug 2023 05:30  Phos  3.2     08-31  Mg     2.2     08-31    TPro  5.5<L>  /  Alb  3.3  /  TBili  0.6  /  DBili  x   /  AST  11  /  ALT  9<L>  /  AlkPhos  62  08-31    Vital Signs Last 24 Hrs  T(C): 36.4 (31 Aug 2023 10:13), Max: 37.9 (30 Aug 2023 14:27)  T(F): 97.5 (31 Aug 2023 10:13), Max: 100.2 (30 Aug 2023 14:27)  HR: 64 (31 Aug 2023 11:53) (58 - 64)  BP: 100/54 (31 Aug 2023 11:53) (84/51 - 105/56)  BP(mean): 74 (31 Aug 2023 11:53) (62 - 75)  RR: 18 (31 Aug 2023 11:53) (15 - 40)  SpO2: 98% (31 Aug 2023 11:53) (94% - 98%)    Parameters below as of 31 Aug 2023 11:53  Patient On (Oxygen Delivery Method): nasal cannula w/ humidification  O2 Flow (L/min): 4      MEDICATIONS  (STANDING):  albuterol/ipratropium for Nebulization 3 milliLiter(s) Nebulizer every 6 hours  apixaban 5 milliGRAM(s) Oral every 12 hours  atorvastatin 40 milliGRAM(s) Oral at bedtime  chlorhexidine 2% Cloths 1 Application(s) Topical <User Schedule>  dextrose 5%. 1000 milliLiter(s) (50 mL/Hr) IV Continuous <Continuous>  dextrose 5%. 1000 milliLiter(s) (100 mL/Hr) IV Continuous <Continuous>  dextrose 50% Injectable 25 Gram(s) IV Push once  dextrose 50% Injectable 25 Gram(s) IV Push once  dextrose 50% Injectable 12.5 Gram(s) IV Push once  digoxin     Tablet 125 MICROGram(s) Oral daily  escitalopram 20 milliGRAM(s) Oral daily  furosemide    Tablet 40 milliGRAM(s) Oral every 24 hours  glucagon  Injectable 1 milliGRAM(s) IntraMuscular once  insulin lispro (ADMELOG) corrective regimen sliding scale   SubCutaneous Before meals and at bedtime  letrozole 2.5 milliGRAM(s) Oral daily  metoprolol succinate  milliGRAM(s) Oral every 24 hours  midodrine 5 milliGRAM(s) Oral every 8 hours  piperacillin/tazobactam IVPB.. 4.5 Gram(s) IV Intermittent every 8 hours  polyethylene glycol 3350 17 Gram(s) Oral every 12 hours  sodium chloride 3%  Inhalation 4 milliLiter(s) Inhalation every 8 hours  spironolactone 25 milliGRAM(s) Oral daily  traZODone 150 milliGRAM(s) Oral at bedtime    MEDICATIONS  (PRN):  dextrose Oral Gel 15 Gram(s) Oral once PRN Blood Glucose LESS THAN 70 milliGRAM(s)/deciliter          Initial Functional Status Assessment :       Previous Level of Function:     · Ambulation Skills	needed assist; needs device  · Transfer Skills	needed assist; needs device  · ADL Skills	needed assist  · Additional Comments	Pt. has an elevator access, she uses RW for shorter distances ambulation and WC for appointments; pt has 24/7 [facility] assist.    Cognitive Status Examination:   · Orientation	oriented to person, place, time and situation  · Level of Consciousness	alert  · Follows Commands and Answers Questions	100% of the time    Range of Motion Exam:   · Range of Motion Examination	bilateral lower extremity ROM was WFL (within functional limits); bilateral upper extremity ROM was WFL (within functional limits)    Manual Muscle Testing:   · Manual Muscle Testing Results	>3/5 throughout by functional assessment against gravity    Bed Mobility: Rolling/Turning:     · Level of [place]	minimum assist (75% patients effort)  · Physical Assist/Nonphysical Assist	1 person assist    Bed Mobility: Scooting/Bridging:     · Level of [place]	moderate assist (50% patients effort)  · Physical Assist/Nonphysical Assist	1 person assist; for scooting to EOB    Bed Mobility: Sit to Supine:     · Level of [place]	moderate assist (50% patients effort)  · Physical Assist/Nonphysical Assist	1 person assist    Bed Mobility: Supine to Sit:     · Level of [place]	minimum assist (75% patients effort); moderate assist (50% patients effort)  · Physical Assist/Nonphysical Assist	1 person assist    Transfer: Sit to Stand:     · Level of [place]	minimum assist (75% patients effort); moderate assist (50% patients effort)  · Physical Assist/Nonphysical Assist	1 person assist  · Weight-Bearing Restrictions	weight-bearing as tolerated  · Assistive Device	rolling walker    Transfer: Stand to Sit:     · Level of [place]	minimum assist (75% patients effort)  · Physical Assist/Nonphysical Assist	1 person assist; verbal cues  · Weight-Bearing Restrictions	weight-bearing as tolerated  · Assistive Device	rolling walker    Sit/Stand Transfer Safety Analysis:     · Transfer Safety Concerns Noted	decreased weight-shifting ability  · Impairments Contributing to Impaired Transfers	impaired balance    Gait Skills:     · Level of [place]	minimum assist (75% patients effort)  · Physical Assist/Nonphysical Assist	1 person assist; verbal cues; nonverbal cues (demo/gestures)  · Weight-Bearing Restrictions	weight-bearing as tolerated  · Assistive Device	rolling walker    Gait Analysis:     · Gait Deviations Noted	increased time in double stance; decreased weight-shifting ability; decreased step length  · Impairments Contributing to Gait Deviations	impaired balance    Stair Negotiation:     · Level of [place]	NT    Balance Skills Assessment:     · Sitting Balance: Static	good balance  · Sitting Balance: Dynamic	good minus  · Sit-to-Stand Balance	fair minus  · Standing Balance: Static	fair balance  · Standing Balance: Dynamic	fair minus  · Identified Impairments Contributing to Balance Disturbance	decreased strength    Sensory Examination:   Sensory Examination:    Grossly Intact:   · Gross Sensory Examination	Grossly Intact; to light touch throughout      Fine Motor Coordination:   Fine Motor Coordination Examination:    Fine Motor Coordination:   · Left Hand, Manipulation of Objects	normal performance  · Right Hand, Manipulation of Objects	normal performance    Clinical Impressions:   · Criteria for Skilled Therapeutic Interventions	impairments found; functional limitations in following categories  · Impairments Found (describe specific impairments)	aerobic capacity/endurance; muscle strength; gait, locomotion, and balance  · Functional Limitations in Following Categories (describe specific limitations)	self-care; home management  · Risk Reduction/Prevention (Describe Specific Areas of risk reduction/prevention)	risk factors        PM&R Impression : as above    Current Disposition Plan Recommendations :     d/c home with home physical therapy  , continue 24/7 [facility]

## 2023-08-31 NOTE — PROGRESS NOTE ADULT - ASSESSMENT
I M    77 y o F w/ afib on eliquis, HTN, DM, PPM, R sided breast ca in the past, bronchiectasis (followed by Dr Hart), CRISTIN on CPAP at night, CHF, presenting with hypoxia at home following aspiration event, found to have AHRF and severe sepsis likely 2/2 to bronchiectasis exacerbation vs aspiration pneumonitis    Problem/Plan - 1:  ·  Problem: Severe sepsis.   ·  Plan: Pt presenting to the ED with hypoxia, tachycardia, tachynea and hypoxia with leukocytosis to 20 and lactate to 2.5 now cleared, following aspiration event evening of 8/28. Pt seen o/p, 8/28 to f/u for recent persistent cough. Source bronchiectasias exacerbation vs aspiration pneumonitis, CXR w/o consolidation or effusions, however remains hypoxic.      Plan:  - Cont Zosyn 3.375g IV q8hrs for 7 days  - F/U BCx and sputum Cx.    Problem/Plan - 2:  ·  Problem: Acute respiratory failure with hypoxia.   ·  Plan: Pt reportedly hypoxic to 84% prior to presentation, on intermittent supplemental O2 at home. Witnessed aspiration event likely precipitating hypoxia    Plan:  - Wean O2 as tolerate  - Maintain aspiration precautions  - cont duoneb q6  - encourage IS.    Problem/Plan - 3:  ·  Problem: Aspiration pneumonitis.   ·  Plan: Pt presenting following choking event, noted to be hypoxic at home. Recent hospitilization at [facility] for aspiration PNA, requiring pressors in ICU. Pt has hx of tracheobronchomalacia, barium swallow 8/2 w/ findings of calcifications on laryngeal cartilage. Treating with Zosyn iso severe sepsis     Plan:  - NPO pending S &S assessment  - F/U Speech and swallow  - Aspiration precautions.    Problem/Plan - 4:  ·  Problem: Chronic atrial fibrillation.   ·  Plan: Home meds: digoxin .152mcg qd, toprol XL 100mg qd and eliquis 5mg BID    Plan:  - F/U dig level  - Cont digoxin   - Cont eliquis 5mg BID.    Problem/Plan - 5:  ·  Problem: Chronic heart failure.   ·  Plan: Home meds: spironolactone 25mg, Toprol XL 100mg, entresto 24/26 and 49/51    Plan:  - Continue home meds.    Problem/Plan - 6:  ·  Problem: Diabetes mellitus.   ·  Plan: Home meds: Jardiance 10mg qd    Plan:  - ISS  - Consistent Carb diet.    Problem/Plan - 7:  ·  Problem: HLD (hyperlipidemia).   ·  Plan: Home meds: atorvastatin 40mg qhs    Plan:  - Cont home meds.    Problem/Plan - 8:  ·  Problem: CRISTIN on CPAP.   ·  Plan: - Cont CRISTIN o/n.    Problem/Plan - 9:  ·  Problem: History of breast cancer.   ·  Plan: Home meds: letrozole 2.5 mg qd    Plan:  - Cont home meds.    Problem/Plan - 10:  ·  Problem: Anxiety and depression.   ·  Plan; Home meds: trazadone 150mg qhs and escitalopram 20mg qd    Plan:  - cont home meds.    Problem/Plan - 11:  ·  Problem: Prophylactic measure.   ·  Plan: F: s/p LR 1.6L  E: replete: K<4, Mg<2  N: DASH -  Kosher  D: Eliquis 5mg BID    Dispo: [facility]  Code:Full.

## 2023-09-01 ENCOUNTER — TRANSCRIPTION ENCOUNTER (OUTPATIENT)
Age: 78
End: 2023-09-01

## 2023-09-01 VITALS
SYSTOLIC BLOOD PRESSURE: 106 MMHG | OXYGEN SATURATION: 93 % | DIASTOLIC BLOOD PRESSURE: 57 MMHG | HEART RATE: 68 BPM | RESPIRATION RATE: 18 BRPM

## 2023-09-01 LAB
A1C WITH ESTIMATED AVERAGE GLUCOSE RESULT: 7.2 % — HIGH (ref 4–5.6)
ANION GAP SERPL CALC-SCNC: 10 MMOL/L — SIGNIFICANT CHANGE UP (ref 5–17)
BASOPHILS # BLD AUTO: 0.05 K/UL — SIGNIFICANT CHANGE UP (ref 0–0.2)
BASOPHILS NFR BLD AUTO: 0.6 % — SIGNIFICANT CHANGE UP (ref 0–2)
BLD GP AB SCN SERPL QL: NEGATIVE — SIGNIFICANT CHANGE UP
BUN SERPL-MCNC: 12 MG/DL — SIGNIFICANT CHANGE UP (ref 7–23)
CALCIUM SERPL-MCNC: 10 MG/DL — SIGNIFICANT CHANGE UP (ref 8.4–10.5)
CHLORIDE SERPL-SCNC: 100 MMOL/L — SIGNIFICANT CHANGE UP (ref 96–108)
CO2 SERPL-SCNC: 28 MMOL/L — SIGNIFICANT CHANGE UP (ref 22–31)
CREAT SERPL-MCNC: 0.67 MG/DL — SIGNIFICANT CHANGE UP (ref 0.5–1.3)
EGFR: 91 ML/MIN/1.73M2 — SIGNIFICANT CHANGE UP
EOSINOPHIL # BLD AUTO: 0.14 K/UL — SIGNIFICANT CHANGE UP (ref 0–0.5)
EOSINOPHIL NFR BLD AUTO: 1.6 % — SIGNIFICANT CHANGE UP (ref 0–6)
ESTIMATED AVERAGE GLUCOSE: 160 MG/DL — HIGH (ref 68–114)
GLUCOSE BLDC GLUCOMTR-MCNC: 136 MG/DL — HIGH (ref 70–99)
GLUCOSE BLDC GLUCOMTR-MCNC: 156 MG/DL — HIGH (ref 70–99)
GLUCOSE SERPL-MCNC: 158 MG/DL — HIGH (ref 70–99)
HCT VFR BLD CALC: 40.3 % — SIGNIFICANT CHANGE UP (ref 34.5–45)
HGB BLD-MCNC: 12.2 G/DL — SIGNIFICANT CHANGE UP (ref 11.5–15.5)
IMM GRANULOCYTES NFR BLD AUTO: 0.6 % — SIGNIFICANT CHANGE UP (ref 0–0.9)
LYMPHOCYTES # BLD AUTO: 2.41 K/UL — SIGNIFICANT CHANGE UP (ref 1–3.3)
LYMPHOCYTES # BLD AUTO: 28.2 % — SIGNIFICANT CHANGE UP (ref 13–44)
MAGNESIUM SERPL-MCNC: 2.1 MG/DL — SIGNIFICANT CHANGE UP (ref 1.6–2.6)
MCHC RBC-ENTMCNC: 29 PG — SIGNIFICANT CHANGE UP (ref 27–34)
MCHC RBC-ENTMCNC: 30.3 GM/DL — LOW (ref 32–36)
MCV RBC AUTO: 95.7 FL — SIGNIFICANT CHANGE UP (ref 80–100)
MONOCYTES # BLD AUTO: 0.74 K/UL — SIGNIFICANT CHANGE UP (ref 0–0.9)
MONOCYTES NFR BLD AUTO: 8.7 % — SIGNIFICANT CHANGE UP (ref 2–14)
NEUTROPHILS # BLD AUTO: 5.16 K/UL — SIGNIFICANT CHANGE UP (ref 1.8–7.4)
NEUTROPHILS NFR BLD AUTO: 60.3 % — SIGNIFICANT CHANGE UP (ref 43–77)
NRBC # BLD: 0 /100 WBCS — SIGNIFICANT CHANGE UP (ref 0–0)
PHOSPHATE SERPL-MCNC: 3.9 MG/DL — SIGNIFICANT CHANGE UP (ref 2.5–4.5)
PLATELET # BLD AUTO: 155 K/UL — SIGNIFICANT CHANGE UP (ref 150–400)
POTASSIUM SERPL-MCNC: 3.8 MMOL/L — SIGNIFICANT CHANGE UP (ref 3.5–5.3)
POTASSIUM SERPL-SCNC: 3.8 MMOL/L — SIGNIFICANT CHANGE UP (ref 3.5–5.3)
RBC # BLD: 4.21 M/UL — SIGNIFICANT CHANGE UP (ref 3.8–5.2)
RBC # FLD: 14.4 % — SIGNIFICANT CHANGE UP (ref 10.3–14.5)
RH IG SCN BLD-IMP: POSITIVE — SIGNIFICANT CHANGE UP
SODIUM SERPL-SCNC: 138 MMOL/L — SIGNIFICANT CHANGE UP (ref 135–145)
WBC # BLD: 8.55 K/UL — SIGNIFICANT CHANGE UP (ref 3.8–10.5)
WBC # FLD AUTO: 8.55 K/UL — SIGNIFICANT CHANGE UP (ref 3.8–10.5)

## 2023-09-01 PROCEDURE — 84145 PROCALCITONIN (PCT): CPT

## 2023-09-01 PROCEDURE — 87449 NOS EACH ORGANISM AG IA: CPT

## 2023-09-01 PROCEDURE — 80053 COMPREHEN METABOLIC PANEL: CPT

## 2023-09-01 PROCEDURE — 86850 RBC ANTIBODY SCREEN: CPT

## 2023-09-01 PROCEDURE — 94640 AIRWAY INHALATION TREATMENT: CPT

## 2023-09-01 PROCEDURE — 87040 BLOOD CULTURE FOR BACTERIA: CPT

## 2023-09-01 PROCEDURE — 80048 BASIC METABOLIC PNL TOTAL CA: CPT

## 2023-09-01 PROCEDURE — 99239 HOSP IP/OBS DSCHRG MGMT >30: CPT

## 2023-09-01 PROCEDURE — 99232 SBSQ HOSP IP/OBS MODERATE 35: CPT

## 2023-09-01 PROCEDURE — 83735 ASSAY OF MAGNESIUM: CPT

## 2023-09-01 PROCEDURE — 87899 AGENT NOS ASSAY W/OPTIC: CPT

## 2023-09-01 PROCEDURE — 84100 ASSAY OF PHOSPHORUS: CPT

## 2023-09-01 PROCEDURE — 84484 ASSAY OF TROPONIN QUANT: CPT

## 2023-09-01 PROCEDURE — 93005 ELECTROCARDIOGRAM TRACING: CPT

## 2023-09-01 PROCEDURE — 85025 COMPLETE CBC W/AUTO DIFF WBC: CPT

## 2023-09-01 PROCEDURE — 94660 CPAP INITIATION&MGMT: CPT

## 2023-09-01 PROCEDURE — 92610 EVALUATE SWALLOWING FUNCTION: CPT

## 2023-09-01 PROCEDURE — 82550 ASSAY OF CK (CPK): CPT

## 2023-09-01 PROCEDURE — 71045 X-RAY EXAM CHEST 1 VIEW: CPT

## 2023-09-01 PROCEDURE — 82553 CREATINE MB FRACTION: CPT

## 2023-09-01 PROCEDURE — 83036 HEMOGLOBIN GLYCOSYLATED A1C: CPT

## 2023-09-01 PROCEDURE — 83605 ASSAY OF LACTIC ACID: CPT

## 2023-09-01 PROCEDURE — 87641 MR-STAPH DNA AMP PROBE: CPT

## 2023-09-01 PROCEDURE — 85730 THROMBOPLASTIN TIME PARTIAL: CPT

## 2023-09-01 PROCEDURE — 84295 ASSAY OF SERUM SODIUM: CPT

## 2023-09-01 PROCEDURE — 96376 TX/PRO/DX INJ SAME DRUG ADON: CPT

## 2023-09-01 PROCEDURE — 86901 BLOOD TYPING SEROLOGIC RH(D): CPT

## 2023-09-01 PROCEDURE — 80162 ASSAY OF DIGOXIN TOTAL: CPT

## 2023-09-01 PROCEDURE — 85610 PROTHROMBIN TIME: CPT

## 2023-09-01 PROCEDURE — 97161 PT EVAL LOW COMPLEX 20 MIN: CPT

## 2023-09-01 PROCEDURE — 82803 BLOOD GASES ANY COMBINATION: CPT

## 2023-09-01 PROCEDURE — 87086 URINE CULTURE/COLONY COUNT: CPT

## 2023-09-01 PROCEDURE — C8929: CPT

## 2023-09-01 PROCEDURE — 87640 STAPH A DNA AMP PROBE: CPT

## 2023-09-01 PROCEDURE — 87070 CULTURE OTHR SPECIMN AEROBIC: CPT

## 2023-09-01 PROCEDURE — 82330 ASSAY OF CALCIUM: CPT

## 2023-09-01 PROCEDURE — 36415 COLL VENOUS BLD VENIPUNCTURE: CPT

## 2023-09-01 PROCEDURE — 84132 ASSAY OF SERUM POTASSIUM: CPT

## 2023-09-01 PROCEDURE — 81003 URINALYSIS AUTO W/O SCOPE: CPT

## 2023-09-01 PROCEDURE — 99285 EMERGENCY DEPT VISIT HI MDM: CPT | Mod: 25

## 2023-09-01 PROCEDURE — 82962 GLUCOSE BLOOD TEST: CPT

## 2023-09-01 PROCEDURE — 96374 THER/PROPH/DIAG INJ IV PUSH: CPT

## 2023-09-01 PROCEDURE — 86900 BLOOD TYPING SEROLOGIC ABO: CPT

## 2023-09-01 PROCEDURE — 0225U NFCT DS DNA&RNA 21 SARSCOV2: CPT

## 2023-09-01 PROCEDURE — 96375 TX/PRO/DX INJ NEW DRUG ADDON: CPT

## 2023-09-01 RX ORDER — POTASSIUM CHLORIDE 20 MEQ
20 PACKET (EA) ORAL ONCE
Refills: 0 | Status: COMPLETED | OUTPATIENT
Start: 2023-09-01 | End: 2023-09-01

## 2023-09-01 RX ORDER — FUROSEMIDE 40 MG
1 TABLET ORAL
Qty: 30 | Refills: 0
Start: 2023-09-01 | End: 2023-09-30

## 2023-09-01 RX ORDER — SACUBITRIL AND VALSARTAN 24; 26 MG/1; MG/1
1 TABLET, FILM COATED ORAL
Refills: 0 | DISCHARGE

## 2023-09-01 RX ORDER — SPIRONOLACTONE 25 MG/1
1 TABLET, FILM COATED ORAL
Qty: 30 | Refills: 0
Start: 2023-09-01 | End: 2023-09-30

## 2023-09-01 RX ORDER — SPIRONOLACTONE 25 MG/1
1 TABLET, FILM COATED ORAL
Refills: 0 | DISCHARGE

## 2023-09-01 RX ORDER — DIGOXIN 250 MCG
1 TABLET ORAL
Refills: 0 | DISCHARGE

## 2023-09-01 RX ORDER — CEFPODOXIME PROXETIL 100 MG
1 TABLET ORAL
Qty: 4 | Refills: 0
Start: 2023-09-01 | End: 2023-09-02

## 2023-09-01 RX ORDER — DIGOXIN 250 MCG
1 TABLET ORAL
Qty: 30 | Refills: 0
Start: 2023-09-01 | End: 2023-09-30

## 2023-09-01 RX ORDER — FUROSEMIDE 40 MG
1 TABLET ORAL
Refills: 0 | DISCHARGE

## 2023-09-01 RX ORDER — METOPROLOL TARTRATE 50 MG
1 TABLET ORAL
Qty: 30 | Refills: 0
Start: 2023-09-01 | End: 2023-09-30

## 2023-09-01 RX ORDER — MIDODRINE HYDROCHLORIDE 2.5 MG/1
1 TABLET ORAL
Qty: 90 | Refills: 0
Start: 2023-09-01 | End: 2023-09-30

## 2023-09-01 RX ORDER — FUROSEMIDE 40 MG
1 TABLET ORAL
Qty: 0 | Refills: 0 | DISCHARGE
Start: 2023-09-01

## 2023-09-01 RX ORDER — MIDODRINE HYDROCHLORIDE 2.5 MG/1
1 TABLET ORAL
Qty: 0 | Refills: 0 | DISCHARGE
Start: 2023-09-01

## 2023-09-01 RX ORDER — METOPROLOL TARTRATE 50 MG
1 TABLET ORAL
Refills: 0 | DISCHARGE

## 2023-09-01 RX ADMIN — Medication 40 MILLIGRAM(S): at 12:43

## 2023-09-01 RX ADMIN — Medication 125 MICROGRAM(S): at 06:05

## 2023-09-01 RX ADMIN — SODIUM CHLORIDE 4 MILLILITER(S): 9 INJECTION INTRAMUSCULAR; INTRAVENOUS; SUBCUTANEOUS at 06:05

## 2023-09-01 RX ADMIN — SPIRONOLACTONE 25 MILLIGRAM(S): 25 TABLET, FILM COATED ORAL at 06:06

## 2023-09-01 RX ADMIN — Medication 3 MILLILITER(S): at 06:06

## 2023-09-01 RX ADMIN — APIXABAN 5 MILLIGRAM(S): 2.5 TABLET, FILM COATED ORAL at 06:06

## 2023-09-01 RX ADMIN — Medication 3 MILLILITER(S): at 00:19

## 2023-09-01 RX ADMIN — CHLORHEXIDINE GLUCONATE 1 APPLICATION(S): 213 SOLUTION TOPICAL at 06:09

## 2023-09-01 RX ADMIN — Medication 3 MILLILITER(S): at 12:44

## 2023-09-01 RX ADMIN — MIDODRINE HYDROCHLORIDE 5 MILLIGRAM(S): 2.5 TABLET ORAL at 13:46

## 2023-09-01 RX ADMIN — Medication 20 MILLIEQUIVALENT(S): at 09:04

## 2023-09-01 RX ADMIN — POLYETHYLENE GLYCOL 3350 17 GRAM(S): 17 POWDER, FOR SOLUTION ORAL at 06:08

## 2023-09-01 RX ADMIN — LETROZOLE 2.5 MILLIGRAM(S): 2.5 TABLET, FILM COATED ORAL at 12:45

## 2023-09-01 RX ADMIN — MIDODRINE HYDROCHLORIDE 5 MILLIGRAM(S): 2.5 TABLET ORAL at 06:08

## 2023-09-01 RX ADMIN — PIPERACILLIN AND TAZOBACTAM 25 GRAM(S): 4; .5 INJECTION, POWDER, LYOPHILIZED, FOR SOLUTION INTRAVENOUS at 06:07

## 2023-09-01 RX ADMIN — ESCITALOPRAM OXALATE 20 MILLIGRAM(S): 10 TABLET, FILM COATED ORAL at 12:43

## 2023-09-01 NOTE — PROGRESS NOTE ADULT - SUBJECTIVE AND OBJECTIVE BOX
Cardiology Consult    O/N:  Interval History/HPI: Pt seen and examined at bedside, NAD, no complaints at this time. ROS s/f ?, remainder of ROS otherwise unremarkable   Telemetry:    OBJECTIVE  T(C): 36.7 (09-01-23 @ 07:00), Max: 37.1 (08-31-23 @ 21:00)  HR: 76 (09-01-23 @ 08:09) (58 - 76)  BP: 120/57 (09-01-23 @ 08:09) (96/51 - 125/67)  RR: 18 (09-01-23 @ 08:09) (15 - 18)  SpO2: 93% (09-01-23 @ 08:09) (92% - 98%)    08-31-23 @ 07:01  -  09-01-23 @ 07:00  --------------------------------------------------------  IN: 490 mL / OUT: 1975 mL / NET: -1485 mL        PHYSICAL EXAM:    Constitutional: resting comfortably in bed; NAD  HEENT: NC/AT, PERRL, EOMI, anicteric sclera, no nasal discharge; uvula midline, no oropharyngeal erythema or exudates; MMM  Neck: supple; no thyromegaly, JVP ? cm H20, JVD +/-  Respiratory: CTA B/L; no W/R/R, no retractions  Cardiac: +S1/S2; RRR; no M/R/G; PMI non-displaced  Gastrointestinal: soft, NT/ND; no rebound or guarding; +BSx4  Extremities: WWP, no clubbing or cyanosis; no peripheral edema  Musculoskeletal: NROM x4; no joint swelling, tenderness or erythema  Vascular: 2+ radial, DP/PT pulses B/L  Dermatologic: skin warm, dry and intact; no rashes, wounds, or scars  Lymphatic: no submandibular or cervical LAD  Neurologic: AAOx3; CNII-XII grossly intact; no focal deficits    LABS:                        12.2   8.55  )-----------( 155      ( 01 Sep 2023 06:40 )             40.3     09-01    138  |  100  |  12  ----------------------------<  158<H>  3.8   |  28  |  0.67    Ca    10.0      01 Sep 2023 06:40  Phos  3.9     09-01  Mg     2.1     09-01    TPro  5.5<L>  /  Alb  3.3  /  TBili  0.6  /  DBili  x   /  AST  11  /  ALT  9<L>  /  AlkPhos  62  08-31      Urinalysis Basic - ( 01 Sep 2023 06:40 )    Color: x / Appearance: x / SG: x / pH: x  Gluc: 158 mg/dL / Ketone: x  / Bili: x / Urobili: x   Blood: x / Protein: x / Nitrite: x   Leuk Esterase: x / RBC: x / WBC x   Sq Epi: x / Non Sq Epi: x / Bacteria: x        RADIOLOGY & ADDITIONAL TESTS:  Reviewed .    MEDICATIONS  (STANDING):  albuterol/ipratropium for Nebulization 3 milliLiter(s) Nebulizer every 6 hours  apixaban 5 milliGRAM(s) Oral every 12 hours  atorvastatin 40 milliGRAM(s) Oral at bedtime  chlorhexidine 2% Cloths 1 Application(s) Topical <User Schedule>  dextrose 5%. 1000 milliLiter(s) (50 mL/Hr) IV Continuous <Continuous>  dextrose 5%. 1000 milliLiter(s) (100 mL/Hr) IV Continuous <Continuous>  dextrose 50% Injectable 25 Gram(s) IV Push once  dextrose 50% Injectable 25 Gram(s) IV Push once  dextrose 50% Injectable 12.5 Gram(s) IV Push once  digoxin     Tablet 125 MICROGram(s) Oral daily  escitalopram 20 milliGRAM(s) Oral daily  furosemide    Tablet 40 milliGRAM(s) Oral every 24 hours  glucagon  Injectable 1 milliGRAM(s) IntraMuscular once  insulin lispro (ADMELOG) corrective regimen sliding scale   SubCutaneous Before meals and at bedtime  letrozole 2.5 milliGRAM(s) Oral daily  metoprolol succinate  milliGRAM(s) Oral every 24 hours  midodrine 5 milliGRAM(s) Oral every 8 hours  piperacillin/tazobactam IVPB.. 4.5 Gram(s) IV Intermittent every 8 hours  polyethylene glycol 3350 17 Gram(s) Oral every 12 hours  potassium chloride    Tablet ER 20 milliEquivalent(s) Oral once  sodium chloride 3%  Inhalation 4 milliLiter(s) Inhalation every 8 hours  spironolactone 25 milliGRAM(s) Oral daily  traZODone 150 milliGRAM(s) Oral at bedtime    MEDICATIONS  (PRN):  dextrose Oral Gel 15 Gram(s) Oral once PRN Blood Glucose LESS THAN 70 milliGRAM(s)/deciliter     Cardiology Consult    O/N: c/f bradycardia with NSVT, however was misread on monitor. ICD pacing to 60s, patient asx.  Interval History/HPI: Pt seen and examined at bedside, NAD, no complaints at this time. Wants to leave today to make it to her cardiologist appointment this afternoon.   Telemetry: intermittently V-Paced at 60 bpm    OBJECTIVE  T(C): 36.7 (09-01-23 @ 07:00), Max: 37.1 (08-31-23 @ 21:00)  HR: 76 (09-01-23 @ 08:09) (58 - 76)  BP: 120/57 (09-01-23 @ 08:09) (96/51 - 125/67)  RR: 18 (09-01-23 @ 08:09) (15 - 18)  SpO2: 93% (09-01-23 @ 08:09) (92% - 98%)    08-31-23 @ 07:01  -  09-01-23 @ 07:00  --------------------------------------------------------  IN: 490 mL / OUT: 1975 mL / NET: -1485 mL        PHYSICAL EXAM:    Constitutional: resting comfortably in bed; NAD  HEENT: NC/AT, PERRL, EOMI, anicteric sclera, no nasal discharge; +NC  Neck: supple; no thyromegaly, no JVD  Respiratory: CTA B/L; no W/R/R, no retractions  Cardiac: +S1/S2; RRR; no M/R/G; PMI non-displaced, b/l mastectomy   Gastrointestinal: soft, NT/ND; no rebound or guarding; +BSx4  Extremities: WWP, no clubbing or cyanosis; no peripheral edema    LABS:                        12.2   8.55  )-----------( 155      ( 01 Sep 2023 06:40 )             40.3     09-01    138  |  100  |  12  ----------------------------<  158<H>  3.8   |  28  |  0.67    Ca    10.0      01 Sep 2023 06:40  Phos  3.9     09-01  Mg     2.1     09-01    TPro  5.5<L>  /  Alb  3.3  /  TBili  0.6  /  DBili  x   /  AST  11  /  ALT  9<L>  /  AlkPhos  62  08-31      Urinalysis Basic - ( 01 Sep 2023 06:40 )    Color: x / Appearance: x / SG: x / pH: x  Gluc: 158 mg/dL / Ketone: x  / Bili: x / Urobili: x   Blood: x / Protein: x / Nitrite: x   Leuk Esterase: x / RBC: x / WBC x   Sq Epi: x / Non Sq Epi: x / Bacteria: x        RADIOLOGY & ADDITIONAL TESTS:  Reviewed .    MEDICATIONS  (STANDING):  albuterol/ipratropium for Nebulization 3 milliLiter(s) Nebulizer every 6 hours  apixaban 5 milliGRAM(s) Oral every 12 hours  atorvastatin 40 milliGRAM(s) Oral at bedtime  chlorhexidine 2% Cloths 1 Application(s) Topical <User Schedule>  dextrose 5%. 1000 milliLiter(s) (50 mL/Hr) IV Continuous <Continuous>  dextrose 5%. 1000 milliLiter(s) (100 mL/Hr) IV Continuous <Continuous>  dextrose 50% Injectable 25 Gram(s) IV Push once  dextrose 50% Injectable 25 Gram(s) IV Push once  dextrose 50% Injectable 12.5 Gram(s) IV Push once  digoxin     Tablet 125 MICROGram(s) Oral daily  escitalopram 20 milliGRAM(s) Oral daily  furosemide    Tablet 40 milliGRAM(s) Oral every 24 hours  glucagon  Injectable 1 milliGRAM(s) IntraMuscular once  insulin lispro (ADMELOG) corrective regimen sliding scale   SubCutaneous Before meals and at bedtime  letrozole 2.5 milliGRAM(s) Oral daily  metoprolol succinate  milliGRAM(s) Oral every 24 hours  midodrine 5 milliGRAM(s) Oral every 8 hours  piperacillin/tazobactam IVPB.. 4.5 Gram(s) IV Intermittent every 8 hours  polyethylene glycol 3350 17 Gram(s) Oral every 12 hours  potassium chloride    Tablet ER 20 milliEquivalent(s) Oral once  sodium chloride 3%  Inhalation 4 milliLiter(s) Inhalation every 8 hours  spironolactone 25 milliGRAM(s) Oral daily  traZODone 150 milliGRAM(s) Oral at bedtime    MEDICATIONS  (PRN):  dextrose Oral Gel 15 Gram(s) Oral once PRN Blood Glucose LESS THAN 70 milliGRAM(s)/deciliter

## 2023-09-01 NOTE — PROGRESS NOTE ADULT - TIME BILLING
I discussed the case with the family cardiology.  The patient did not have any V. tach was mostly paced and with SVT.  The patient is clinically stable and the sepsis picture improved.  She is to be discharged on oral anticoagulation antibiotic.  Patient is to resume resume diuretics and cardiac meds.  The patient is stable and should be followed by her cardiologist and EP as an outpatient patient will be discharged on home oxygen and she has home oxygen at home and will be weaned as as tolerated  Patient seen and examined with house-staff during bedside rounds.  Resident note read, including vitals, physical findings, laboratory data, and radiological reports.   Revisions included below.  Direct personal management at bed side and extensive interpretation of the data.  Plan was outlined and discussed in details with the housestaff.  Decision making of high complexity  Action taken for acute disease activity to reflect the level of care provided:  - medication reconciliation  - review laboratory data

## 2023-09-01 NOTE — PROGRESS NOTE ADULT - ASSESSMENT
78 yo F w/ afib on eliquis, HTN, NIDDM type 2, HFrEF (EF 30-35%), BiV AICD (medtronic, 2011), R sided breast ca in the past, bronchiectasis (followed by Dr Hart), CRISTIN on CPAP at night, presenting with hypoxia. Pt was in usual state of health yesterday, per family and pt  today at 8pm was eating and began coughing and was febrile to 101 and dyspneic with hypoxia to 84% on RA which improved w/ NC, they did home labs w/ WBC 20 and lactate 2.2 so brought her to ED. Pt was recently admitted at [facility] 2 months ago for sepsis 2/2 aspiration pneumonia where she was admitted to the ICU requiring pressors. Admitted for AHRF and severe sepsis likely 2/2 to bronchiectasis exacerbation vs aspiration pneumonitis. RRT called 8/29 for acute worsening of respiratory status in a setting of fluid overload. Cardiology consulted for volume optimization.     Review of studies:  EKG: Sinus tachycardia, 1st degree AV block, atypical LBBB  TTE (6/2022): EF 20-25% with global hypokinesis, dilated LA, moderate MR, PASP 36  TTE (7/2022): mod-sev reduced LVSF, EF 30-35%, septal wall thickness, mildly dilated RV and reduced RVSF, biatrial enlargement, mild-mod AR/TR, mod MR, pulm HTN PASP 42, borderline dilated aortic root, similar compared to prior.    #AHRF 2/2 aspiration pneumonia and flash pulmonary edema w/ HFrEF, VHD and moderate MR  - s/p 80 mg IV lasix, diuresed >6 L UOP  - patient clinically euvolemic with no s/sx's of acute or worsening CHF  - continue lasix 40 mg PO daily  - continue Toprol XL 100mg and aldactone 25 mg daily  - continue midodrine 10 mg q8   - hold Entresto due to borderline BP with plan to introduce once weaned off midodrine.     #HFrEF (EF 30-35%)  - cw toprol  mg daily  - cw digoxin 125 mcg daily  - follows with Dr. Dickinson at [facility]  - s/p BiV AICD (medtronic, 2011)    #chronic afib  - [id number]   - on Eliquis at home  - continue with anti-coagulation      Please see attending attestation for final recommendations.

## 2023-09-01 NOTE — PROGRESS NOTE ADULT - ATTENDING COMMENTS
Patient is a 76 yo F w/PMhx HFrEF. Afib on Eliquis s/p PPM vs AICD, bronchiectasis, recent exacerbation, p/w AHRF likely 2/2 aspiration pneumonitis c/b flash pulmonary edema in the setting of acute HTN episode, transferred to MICU for impending respiratory failure, Now clinically improved after diuresis and Positive pressure support    Review of studies:  - EKG 08/29/23: Sinus tachycardia, 1st degree AV block, atypical LBBB  - TTE (08/2023):  Moderately-to-severely reduced left ventricular systolic function (EF 30-35%). Mildly dilated right ventricular size. Mildly reduced right ventricular systolic function. Severely dilated left atrium. Mild-to-moderate aortic regurgitation. Mild-to-moderate mitral regurgitation. Compared to the previous TTE performed on 7/19/2022, pulmonary artery systolic pressure is lower.  - TTE (6/2022): EF 20-25% with global hypokinesis, dilated LA, moderate MR, PASP 36    -#Hypoxic Respiratory Failure with Flash pulmonary Edema in patient with HFrEF and Valvular Heart Disease, Resolved  - Patient with flash pulmonary edema on 8/29 in setting of HTN episode, requiring BIPAP and aggressive diuresis  - Clinically she has diuresed over close to 8 Liters and is overall net negative 6.7 L this hospitalization with significant clinical improvement  - Clinically she is very well compensated without JVD, Bibasilar crackles or lower extremity edema noted on exam.   - At this time would cont Diuresis with lasix 40 mg po daily, spironolactone 25mg   - As she is requiring Midodrine to Maintain BP at this lashanda would hold entresto 24/26 with plan to resume as an outpatient  - Cont with Toprol 100 mg po daily. Would wean off midodrine while on BB as tolerated  - Patient is scheduled to see Dr. Dickinson at [facility] upon DC this afternoon. Her appointment was scheduled months ago and she in intent on keeping it    # Mild to Moderate MR  - Patient with severely Dilated LA with evidence of Mild ot Moderate MR, stable from Echo from 06/2022  - Echo with EF of 30-35%, mildly dilated LV, and mildly thickened MV with Mitral annular calcification  - Should patient develop worsening MR in the future  she would benefit from Mitral Transcatheter [id number] Repair (KAMILA) evaluation  - Maintain euvolemia as above  - Will resume Afterload reduction with Entresto once BP normalizes and off Midorine    # Afib  - Continue with Eliqusis 5 mg PO BID and Digoxin 125 mICROGRAM daily  - Cont with Toprol 100 mg po daily .      Patient is scheduled to see Dr. Dicknison at [facility] upon DC this afternoon. Her appointment was scheduled months ago and she in intent on keeping it. She will be discharged shortly with plans to go straight to his office with her daughters

## 2023-09-01 NOTE — DISCHARGE NOTE NURSING/CASE MANAGEMENT/SOCIAL WORK - NSDCPEFALRISK_GEN_ALL_CORE
For information on Fall & Injury Prevention, visit: [email or web address] OR  [email or web address] OR  https://www.cdc.gov/steadi/patient.html

## 2023-09-01 NOTE — DISCHARGE NOTE NURSING/CASE MANAGEMENT/SOCIAL WORK - PATIENT PORTAL LINK FT
You can access the FollowMyHealth Patient Portal offered by [facility] by registering at the following website: [email or web address]. By joining [facility]’s FollowMyHealth portal, you will also be able to view your health information using other applications (apps) compatible with our system.

## 2023-09-03 LAB
CULTURE RESULTS: SIGNIFICANT CHANGE UP
SPECIMEN SOURCE: SIGNIFICANT CHANGE UP

## 2023-09-04 ENCOUNTER — TRANSCRIPTION ENCOUNTER (OUTPATIENT)
Age: 78
End: 2023-09-04

## 2023-09-05 ENCOUNTER — TRANSCRIPTION ENCOUNTER (OUTPATIENT)
Age: 78
End: 2023-09-05

## 2023-09-11 DIAGNOSIS — Z99.89 DEPENDENCE ON OTHER ENABLING MACHINES AND DEVICES: ICD-10-CM

## 2023-09-11 DIAGNOSIS — A41.9 SEPSIS, UNSPECIFIED ORGANISM: ICD-10-CM

## 2023-09-11 DIAGNOSIS — I48.20 CHRONIC ATRIAL FIBRILLATION, UNSPECIFIED: ICD-10-CM

## 2023-09-11 DIAGNOSIS — E11.9 TYPE 2 DIABETES MELLITUS WITHOUT COMPLICATIONS: ICD-10-CM

## 2023-09-11 DIAGNOSIS — F41.8 OTHER SPECIFIED ANXIETY DISORDERS: ICD-10-CM

## 2023-09-11 DIAGNOSIS — J69.0 PNEUMONITIS DUE TO INHALATION OF FOOD AND VOMIT: ICD-10-CM

## 2023-09-11 DIAGNOSIS — I50.23 ACUTE ON CHRONIC SYSTOLIC (CONGESTIVE) HEART FAILURE: ICD-10-CM

## 2023-09-11 DIAGNOSIS — R65.20 SEVERE SEPSIS WITHOUT SEPTIC SHOCK: ICD-10-CM

## 2023-09-11 DIAGNOSIS — J96.01 ACUTE RESPIRATORY FAILURE WITH HYPOXIA: ICD-10-CM

## 2023-09-11 DIAGNOSIS — I11.0 HYPERTENSIVE HEART DISEASE WITH HEART FAILURE: ICD-10-CM

## 2023-09-11 DIAGNOSIS — J47.1 BRONCHIECTASIS WITH (ACUTE) EXACERBATION: ICD-10-CM

## 2023-09-11 DIAGNOSIS — Z85.3 PERSONAL HISTORY OF MALIGNANT NEOPLASM OF BREAST: ICD-10-CM

## 2023-09-13 RX ORDER — ALBUTEROL SULFATE 2.5 MG/3ML
(2.5 MG/3ML) SOLUTION RESPIRATORY (INHALATION)
Qty: 1 | Refills: 11 | Status: ACTIVE | COMMUNITY
Start: 2023-09-13 | End: 1900-01-01

## 2023-09-13 RX ORDER — IPRATROPIUM BROMIDE 0.5 MG/2.5ML
0.02 SOLUTION RESPIRATORY (INHALATION) 4 TIMES DAILY
Qty: 6 | Refills: 3 | Status: ACTIVE | COMMUNITY
Start: 2023-09-13 | End: 1900-01-01

## 2023-12-21 ENCOUNTER — APPOINTMENT (OUTPATIENT)
Dept: PULMONOLOGY | Facility: CLINIC | Age: 78
End: 2023-12-21

## 2024-01-23 NOTE — ED ADULT NURSE NOTE - BREATHING
O2 saturation 92% on room air.  Patient placed on 2 liters of O2.  Saturation increased to 98%.  Respirations easy and unlabored.   spontaneous/labored/dyspnea upon exertion/orthopnea

## 2024-01-29 ENCOUNTER — RESULT REVIEW (OUTPATIENT)
Age: 79
End: 2024-01-29

## 2024-01-30 ENCOUNTER — APPOINTMENT (OUTPATIENT)
Dept: PULMONOLOGY | Facility: CLINIC | Age: 79
End: 2024-01-30
Payer: MEDICARE

## 2024-01-30 VITALS
TEMPERATURE: 98.7 F | DIASTOLIC BLOOD PRESSURE: 60 MMHG | HEART RATE: 63 BPM | SYSTOLIC BLOOD PRESSURE: 105 MMHG | HEIGHT: 60 IN | OXYGEN SATURATION: 93 %

## 2024-01-30 VITALS — HEIGHT: 60 IN

## 2024-01-30 PROCEDURE — 99214 OFFICE O/P EST MOD 30 MIN: CPT

## 2024-01-30 RX ORDER — EMPAGLIFLOZIN 25 MG/1
25 TABLET, FILM COATED ORAL DAILY
Refills: 0 | Status: ACTIVE | COMMUNITY
Start: 2022-06-16

## 2024-01-30 RX ORDER — SACUBITRIL AND VALSARTAN 24; 26 MG/1; MG/1
24-26 TABLET, FILM COATED ORAL
Refills: 0 | Status: ACTIVE | COMMUNITY

## 2024-01-30 RX ORDER — CEFDINIR 300 MG/1
300 CAPSULE ORAL
Qty: 14 | Refills: 0 | Status: DISCONTINUED | COMMUNITY
Start: 2023-08-28 | End: 2024-01-30

## 2024-01-30 RX ORDER — SPIRONOLACTONE 25 MG/1
25 TABLET ORAL
Refills: 0 | Status: ACTIVE | COMMUNITY

## 2024-01-30 RX ORDER — METOPROLOL TARTRATE 25 MG/1
25 TABLET, FILM COATED ORAL TWICE DAILY
Qty: 180 | Refills: 3 | Status: ACTIVE | COMMUNITY
Start: 2022-06-16

## 2024-01-30 NOTE — HISTORY OF PRESENT ILLNESS
[Never] : never [TextBox_4] : She is doing very well since she was discharged from the hospital in September.  She at one point her Lasix was increased.  She does not use the oxygen.  She is compliant with the CPAP at night.  She is very careful when she eats.  She does not eat liquid and she used the nectar and she is very careful with swallowing and small meals.  She lives sleeps with 2 pillows and she stays up after meals.  The cough is less and less.  No fever chills no coughing blood.  The sputum is not green [ESS] : 0

## 2024-01-30 NOTE — END OF VISIT
[Time Spent: ___ minutes] : I have spent [unfilled] minutes of time on the encounter. [>50% of the face to face encounter time was spent on counseling and/or coordination of care for ___] : Greater than 50% of the face to face encounter time was spent on counseling and/or coordination of care for [unfilled] [FreeTextEntry3] : I compared the x-ray with the previous 1 and reviewed the swallow evaluation.  Discussed the case in details with the daughter

## 2024-01-30 NOTE — ASSESSMENT
[FreeTextEntry1] : Chronic hypoxic respiratory failure  The patient is clinically improving.  The patient is weaned off the oxygen.  The baseline oxygen saturation at rest is 93% on room air.  The patient improved with the diuresis and swallowing precautions.  Continue on the current management.  Informed the patient at this point there is no indication to use the saline and the acetylcysteine nebulizer.  Obstructive sleep apnea  The patient is compliant with the CPAP and there is no clinical evidence of aspiration on the CPAP mask  Pulmonary nodule  Repeated the chest x-ray and there is improvement in the chest congestion which most likely was consistent with fluid overload.  Follow-up in 3 months and will repeat CT scan of the chest.  Tracheobronchomalacia  Stable most likely is related to the frequent aspiration.  I reviewed the barium swallow and she has aspiration to liquid but not to solid and she is eating small meals and sitting up in the upright position after.  At this point continue albuterol and ipratropium combination as needed basis.  Patient is clinically improving.  There is an element of fluid overload.  I informed the patient to update me on her condition if there is change in her status on follow-up in 3 months

## 2024-01-31 RX ORDER — ALBUTEROL SULFATE 2.5 MG/3ML
(2.5 MG/3ML) SOLUTION RESPIRATORY (INHALATION)
Qty: 1 | Refills: 6 | Status: ACTIVE | COMMUNITY
Start: 2024-01-31 | End: 1900-01-01

## 2024-01-31 RX ORDER — IPRATROPIUM BROMIDE 0.5 MG/2.5ML
0.02 SOLUTION RESPIRATORY (INHALATION) 4 TIMES DAILY
Qty: 6 | Refills: 3 | Status: ACTIVE | COMMUNITY
Start: 2024-01-31 | End: 1900-01-01

## 2024-02-01 RX ORDER — ACETYLCYSTEINE 200 MG/ML
20 SOLUTION ORAL; RESPIRATORY (INHALATION) EVERY 8 HOURS
Qty: 30 | Refills: 11 | Status: ACTIVE | COMMUNITY
Start: 2022-06-16 | End: 1900-01-01

## 2024-05-07 ENCOUNTER — APPOINTMENT (OUTPATIENT)
Dept: PULMONOLOGY | Facility: CLINIC | Age: 79
End: 2024-05-07
Payer: MEDICARE

## 2024-05-07 VITALS
DIASTOLIC BLOOD PRESSURE: 48 MMHG | RESPIRATION RATE: 20 BRPM | TEMPERATURE: 97.9 F | BODY MASS INDEX: 31.41 KG/M2 | WEIGHT: 160 LBS | HEART RATE: 61 BPM | SYSTOLIC BLOOD PRESSURE: 87 MMHG | HEIGHT: 60 IN | OXYGEN SATURATION: 87 %

## 2024-05-07 DIAGNOSIS — J39.8 OTHER SPECIFIED DISEASES OF UPPER RESPIRATORY TRACT: ICD-10-CM

## 2024-05-07 DIAGNOSIS — R91.1 SOLITARY PULMONARY NODULE: ICD-10-CM

## 2024-05-07 DIAGNOSIS — J96.11 CHRONIC RESPIRATORY FAILURE WITH HYPOXIA: ICD-10-CM

## 2024-05-07 DIAGNOSIS — G47.33 OBSTRUCTIVE SLEEP APNEA (ADULT) (PEDIATRIC): ICD-10-CM

## 2024-05-07 PROCEDURE — G2211 COMPLEX E/M VISIT ADD ON: CPT

## 2024-05-07 PROCEDURE — 99214 OFFICE O/P EST MOD 30 MIN: CPT

## 2024-05-07 RX ORDER — SODIUM CHLORIDE FOR INHALATION 0.9 %
0.9 VIAL, NEBULIZER (ML) INHALATION
Qty: 2 | Refills: 11 | Status: ACTIVE | COMMUNITY
Start: 2022-06-16 | End: 1900-01-01

## 2024-05-07 RX ORDER — IPRATROPIUM BROMIDE AND ALBUTEROL SULFATE 2.5; .5 MG/3ML; MG/3ML
0.5-2.5 (3) SOLUTION RESPIRATORY (INHALATION)
Qty: 120 | Refills: 11 | Status: ACTIVE | COMMUNITY
Start: 2022-06-16 | End: 1900-01-01

## 2024-05-07 NOTE — HISTORY OF PRESENT ILLNESS
[Never] : never [TextBox_4] : She stopped the inhalers and was using as needed.  Within 48 hours her wheezing and cough came back and it was started and since then she is better she is okay is only cough and bring up phlegm.  She is tired in the morning she is compliant with the CPAP mask every night is using a nasal mask but she takes 2 naps during the day. [ESS] : 0

## 2024-05-07 NOTE — ASSESSMENT
[FreeTextEntry1] : Obstructive sleep apnea  The patient is compliant with the CPAP mask and using nasal mask.  The patient is complaining about fatigue in the morning.  The CPAP settings are auto titratable minimum 4 to maximum 20.  The patient changes the appliance every 3 months and using the humidifier.  The provider changed the mask and adjusted the for better fit.  I discussed the case with the daughter and the patient in details we will observe over the next 4 weeks and if the patient is still symptomatic then we will proceed with in-house titration.  Pulmonary nodule  The x-ray is stable on follow-up with yearly chest x-ray she is due to have an x-ray and may  Chronic respiratory failure with hypoxemia  The patient is clinically stable and she is improving.  The patient saturation is normal in the room air but she uses the as needed oxygen.  Tracheobronchomalacia  Instructed the patient to continue on the triple nebulizer regimen with albuterol ipratropium in addition to Mucomyst plus saline.  Patient is clinically stable and I will hold on this.  At this point.  The daughter will update me on the status after discontinuing Spiriva

## 2024-05-21 ENCOUNTER — NON-APPOINTMENT (OUTPATIENT)
Age: 79
End: 2024-05-21

## 2024-05-22 ENCOUNTER — OUTPATIENT (OUTPATIENT)
Dept: OUTPATIENT SERVICES | Facility: HOSPITAL | Age: 79
LOS: 1 days | End: 2024-05-22
Payer: MEDICARE

## 2024-05-22 ENCOUNTER — APPOINTMENT (OUTPATIENT)
Dept: SLEEP CENTER | Facility: HOSPITAL | Age: 79
End: 2024-05-22

## 2024-05-22 DIAGNOSIS — G47.33 OBSTRUCTIVE SLEEP APNEA (ADULT) (PEDIATRIC): ICD-10-CM

## 2024-05-22 PROCEDURE — 95811 POLYSOM 6/>YRS CPAP 4/> PARM: CPT | Mod: 26

## 2024-05-22 PROCEDURE — 95811 POLYSOM 6/>YRS CPAP 4/> PARM: CPT

## 2024-06-10 ENCOUNTER — TRANSCRIPTION ENCOUNTER (OUTPATIENT)
Age: 79
End: 2024-06-10

## 2024-06-18 ENCOUNTER — NON-APPOINTMENT (OUTPATIENT)
Age: 79
End: 2024-06-18

## 2024-08-08 ENCOUNTER — APPOINTMENT (OUTPATIENT)
Dept: PULMONOLOGY | Facility: CLINIC | Age: 79
End: 2024-08-08

## 2024-08-08 PROCEDURE — 99214 OFFICE O/P EST MOD 30 MIN: CPT

## 2024-08-08 PROCEDURE — G2211 COMPLEX E/M VISIT ADD ON: CPT

## 2024-08-08 NOTE — HISTORY OF PRESENT ILLNESS
[Never] : never [TextBox_4] : 78 yr old female CRISTIN, chronic respiratory failure, pulmonary nodule, tracheobronchialmalcia.  Presents today for follow up vist.   Pt daughter states they got a new machine and was changed to bipap with full face mask started 4 weeks ago.  She needed to change to small face mask due to leakage.  She feels the mask is too tight and the pressure is too strong. She is sleeping with the bipap 8 hours.   Pt complaint with the nebulizer treatments.  Her oxygen saturation in is in the 90s. [ESS] : 0

## 2024-08-08 NOTE — END OF VISIT
[Time Spent: ___ minutes] : I have spent [unfilled] minutes of time on the encounter. [FreeTextEntry3] : Her pulmonary status stable to get the bronchiectasis to comply with a mucolytic's and a bronchodilator.  The patient did not require neither hospitalization nor urgent care since the last time I saw her.  No indication for exacerbation of bronchiectasis.  I reviewed her CPAP and she is on CPAP not BiPAP.  I did adjust the mask with the setting.  I gave the patient a request to change from CPAP to BiPAP and also to continue on medium facemask.

## 2024-08-08 NOTE — ASSESSMENT
[FreeTextEntry1] : Obstructive sleep apnea  The patient is compliant device at night about 8 hours. The patient air leakage and The CPAP settings are auto titratable minimum 4 to maximum 20.  The patient changes the appliance every 3 months and using the humidifier.  The provider changed the mask and adjusted the for better fit.  I discussed the case with the daughter and the patient in details we will observe over the next 4 weeks and if the patient is still symptomatic then we will proceed with in-house titration.  Pt aware results of sleep study recommend bipap 18/12 cm h20 with Airtouch F30i, size medium mask. Ordered and sent to supplier.  Pulmonary nodule  The x-ray is stable on follow-up with yearly chest x-ray she is due to have an x-ray 1/2025.  Chronic respiratory failure with hypoxemia  The patient is clinically stable and she is improving.  The patient saturation is normal in the room air but she uses the as needed oxygen.  Tracheobronchomalacia  Instructed the patient to continue on the triple nebulizer regimen with albuterol ipratropium in addition to Mucomyst plus saline.  Patient is clinically stable and I will hold on this.  At this point.  The daughter will update me on the status after discontinuing Spiriva

## 2024-08-13 NOTE — REVIEW OF SYSTEMS
Admission medication history completed at [facility]. Please see Pharmacist Admission Medication History note from 8/11/2024.    
[Negative] : Endocrine

## 2024-08-21 ENCOUNTER — APPOINTMENT (OUTPATIENT)
Dept: PULMONOLOGY | Facility: CLINIC | Age: 79
End: 2024-08-21

## 2024-08-21 DIAGNOSIS — G47.33 OBSTRUCTIVE SLEEP APNEA (ADULT) (PEDIATRIC): ICD-10-CM

## 2024-08-21 DIAGNOSIS — J39.8 OTHER SPECIFIED DISEASES OF UPPER RESPIRATORY TRACT: ICD-10-CM

## 2024-08-21 PROCEDURE — 99443: CPT | Mod: 93

## 2024-08-22 NOTE — ASSESSMENT
[FreeTextEntry1] : #Obstructive sleep apnea  The patient is compliant device at night about 8 hours. Setting on machine were changed a week ago to bipap 18/12 with new mask Airtouch F30i, size medium.   She is tolerating the machine well.  The patient changes the appliance every 3 months and using the humidifier.  I discussed the case with the daughter and the patient in details we will observe over the next 4 weeks and if the patient is still symptomatic then we will proceed with in-house titration.   #Pulmonary nodule  The x-ray is stable on follow-up with yearly chest x-ray she is due to have an x-ray 1/2025.  #Chronic respiratory failure with hypoxemia  The patient is clinically stable, and she is improving.  The patient saturation is normal in the room air but she uses the as needed oxygen.  #Tracheobronchomalacia  Instructed the patient to continue on the triple nebulizer regimen with albuterol ipratropium in addition to Mucomyst plus saline.  Patient is clinically stable.

## 2024-08-22 NOTE — HISTORY OF PRESENT ILLNESS
[Never] : never [Home] : at home, [unfilled] , at the time of the visit. [Medical Office: ([facility])___] : at the medical office located in  [Family Member] : family member [Verbal consent obtained from patient] : the patient, [unfilled] [ESS] : 0 [TextBox_4] : 78 yr old female CRISTIN, chronic respiratory failure, pulmonary nodule, tracheobronchialmalcia.  Presents for telemed visit.  Pt daughter states they got a new machine and was changed to bipap with full face mask started 4 weeks ago.  She needed to change to small face mask due to leakage. She is sleeping with the bipap 8 hours.

## 2024-08-22 NOTE — HISTORY OF PRESENT ILLNESS
[Never] : never [Home] : at home, [unfilled] , at the time of the visit. [Medical Office: ([facility])___] : at the medical office located in  [Family Member] : family member [Verbal consent obtained from patient] : the patient, [unfilled] [ESS] : 0 [TextBox_4] : 78 yr old female CRISTIN, chronic respiratory failure, pulmonary nodule, tracheobronchialmalcia.  Presents for telemed visit.  Pt daughter states they got a new machine and was changed to bipap with full face mask started 4 weeks ago.  She needed to change to small face mask due to leakage. She is sleeping with the bipap 8 hours.

## 2024-08-22 NOTE — END OF VISIT
[FreeTextEntry3] : Her pulmonary status stable to get the bronchiectasis to comply with a mucolytic's and a bronchodilator.  The patient did not require neither hospitalization nor urgent care since the last time I saw her.  No indication for exacerbation of bronchiectasis.  I reviewed her CPAP and she is on CPAP not BiPAP.  I did adjust the mask with the setting.  I gave the patient a request to change from CPAP to BiPAP and also to continue on medium facemask. [Time Spent: ___ minutes] : I have spent [unfilled] minutes of time on the encounter which excludes teaching and separately reported services.

## 2024-08-22 NOTE — REVIEW OF SYSTEMS
[SOB on Exertion] : sob on exertion [Negative] : Endocrine [Cough] : cough [Hemoptysis] : no hemoptysis [Chest Tightness] : no chest tightness [Frequent URIs] : no frequent URIs [Sputum] : no sputum [Dyspnea] : no dyspnea [Pleuritic Pain] : no pleuritic pain [Wheezing] : no wheezing [A.M. Dry Mouth] : no a.m. dry mouth

## 2024-09-26 ENCOUNTER — RESULT REVIEW (OUTPATIENT)
Age: 79
End: 2024-09-26

## 2024-09-26 ENCOUNTER — APPOINTMENT (OUTPATIENT)
Dept: PULMONOLOGY | Facility: CLINIC | Age: 79
End: 2024-09-26
Payer: MEDICARE

## 2024-09-26 VITALS
DIASTOLIC BLOOD PRESSURE: 61 MMHG | RESPIRATION RATE: 24 BRPM | OXYGEN SATURATION: 91 % | SYSTOLIC BLOOD PRESSURE: 103 MMHG | BODY MASS INDEX: 31.41 KG/M2 | TEMPERATURE: 98.1 F | WEIGHT: 160 LBS | HEIGHT: 60 IN | HEART RATE: 62 BPM

## 2024-09-26 DIAGNOSIS — J39.8 OTHER SPECIFIED DISEASES OF UPPER RESPIRATORY TRACT: ICD-10-CM

## 2024-09-26 DIAGNOSIS — J96.11 CHRONIC RESPIRATORY FAILURE WITH HYPOXIA: ICD-10-CM

## 2024-09-26 DIAGNOSIS — G47.33 OBSTRUCTIVE SLEEP APNEA (ADULT) (PEDIATRIC): ICD-10-CM

## 2024-09-26 DIAGNOSIS — R05.3 CHRONIC COUGH: ICD-10-CM

## 2024-09-26 DIAGNOSIS — R91.1 SOLITARY PULMONARY NODULE: ICD-10-CM

## 2024-09-26 PROCEDURE — 99214 OFFICE O/P EST MOD 30 MIN: CPT

## 2024-09-26 PROCEDURE — G2211 COMPLEX E/M VISIT ADD ON: CPT

## 2024-09-26 NOTE — ASSESSMENT
[FreeTextEntry1] : #Obstructive sleep apnea  The patient is compliant device at night about 8 hours. Setting on machine were changed a week ago to bipap 18/12 with new mask Airtouch F30i, size medium.   She is tolerating the machine well.  The patient changes the appliance every 3 months and using the humidifier.    #cough  Repeat CXR today looks like congestion, no signs of PNA.  She is to take one day of extra dosage of Lasix and follow with cardiology.  Called cardiology regarding pt.  Finish the antibiotic.  #Pulmonary nodule  The x-ray is stable on follow-up with yearly chest x-ray she is due to have an x-ray 1/2025.  Repeat CXR today looks like congestion, no signs of PNA.    #Chronic respiratory failure with hypoxemia  The patient is clinically stable, and she is improving.  The patient saturation is normal in the room air but she uses the as needed oxygen.  #Tracheobronchomalacia  Instructed the patient to continue on the triple nebulizer regimen with albuterol ipratropium in addition to Mucomyst plus saline.  Patient is clinically stable.

## 2024-09-26 NOTE — HISTORY OF PRESENT ILLNESS
[Never] : never [TextBox_4] : 78 yr old female CRISTIN, chronic respiratory failure, pulmonary nodule, tracheobronchialmalcia.  Presents for follow up visit.  Pt daughter with her pt.  She is coughing for the past 3 weeks.  She is getting up dark green but now yellow-green sputum.  Denies fevers.  She having weakness.  She uses a wheelchair gets up to go bathroom.  She uses the oxygen as needed and required recently over the last 3 week intermittently.  She is using the bipap with full face mask every night for approx. 6 - 8 hours a night.    She is using nebulizer 3 times day albuterol with Sodium chloride / Mucinex BID.  She was seen by Dr. Dickinson on Thursday and was given antibiotics (Cefpodoxime 200 mg BID) for bronchitis.  Denies hemoptysis.  She is talking Lasix 40 mg and [place] lactone 25 mg. [ESS] : 0

## 2024-09-26 NOTE — END OF VISIT
[Time Spent: ___ minutes] : I have spent [unfilled] minutes of time on the encounter which excludes teaching and separately reported services. [FreeTextEntry3] : Pt seen with SARAH Barfield and agree on the above plan of care.The patient is stable.  The patient was had a course of antibiotic by her primary physician.  I instructed the patient is to continue on the current regimen.  I will discuss with the cardiologist as in my opinion the chest x-ray is consistent with fluid overload

## 2024-12-03 ENCOUNTER — APPOINTMENT (OUTPATIENT)
Dept: PULMONOLOGY | Facility: CLINIC | Age: 79
End: 2024-12-03
Payer: MEDICARE

## 2024-12-03 VITALS
SYSTOLIC BLOOD PRESSURE: 90 MMHG | HEIGHT: 60 IN | TEMPERATURE: 97.9 F | BODY MASS INDEX: 31.41 KG/M2 | HEART RATE: 62 BPM | OXYGEN SATURATION: 89 % | DIASTOLIC BLOOD PRESSURE: 60 MMHG | RESPIRATION RATE: 24 BRPM | WEIGHT: 160 LBS

## 2024-12-03 DIAGNOSIS — R05.3 CHRONIC COUGH: ICD-10-CM

## 2024-12-03 DIAGNOSIS — R91.1 SOLITARY PULMONARY NODULE: ICD-10-CM

## 2024-12-03 DIAGNOSIS — G47.33 OBSTRUCTIVE SLEEP APNEA (ADULT) (PEDIATRIC): ICD-10-CM

## 2024-12-03 DIAGNOSIS — J39.8 OTHER SPECIFIED DISEASES OF UPPER RESPIRATORY TRACT: ICD-10-CM

## 2024-12-03 DIAGNOSIS — J96.11 CHRONIC RESPIRATORY FAILURE WITH HYPOXIA: ICD-10-CM

## 2024-12-03 PROCEDURE — G2211 COMPLEX E/M VISIT ADD ON: CPT

## 2024-12-03 PROCEDURE — 99214 OFFICE O/P EST MOD 30 MIN: CPT

## 2024-12-17 NOTE — H&P ADULT - PROBLEM SELECTOR PLAN 5
Opt out
Home meds: spironolactone 25mg, Toprol XL 100mg, entresto 24/26 and 49/51    Plan:  - Continue home meds

## 2025-02-03 RX ORDER — ALBUTEROL SULFATE 2.5 MG/3ML
(2.5 MG/3ML) SOLUTION RESPIRATORY (INHALATION)
Qty: 1 | Refills: 3 | Status: ACTIVE | COMMUNITY
Start: 2025-02-03 | End: 1900-01-01

## 2025-03-25 ENCOUNTER — APPOINTMENT (OUTPATIENT)
Dept: PULMONOLOGY | Facility: CLINIC | Age: 80
End: 2025-03-25
Payer: MEDICARE

## 2025-03-25 VITALS
WEIGHT: 160 LBS | HEIGHT: 60 IN | SYSTOLIC BLOOD PRESSURE: 101 MMHG | DIASTOLIC BLOOD PRESSURE: 62 MMHG | HEART RATE: 83 BPM | RESPIRATION RATE: 24 BRPM | OXYGEN SATURATION: 90 % | TEMPERATURE: 98.2 F | BODY MASS INDEX: 31.41 KG/M2

## 2025-03-25 DIAGNOSIS — G47.33 OBSTRUCTIVE SLEEP APNEA (ADULT) (PEDIATRIC): ICD-10-CM

## 2025-03-25 DIAGNOSIS — J96.11 CHRONIC RESPIRATORY FAILURE WITH HYPOXIA: ICD-10-CM

## 2025-03-25 DIAGNOSIS — R91.1 SOLITARY PULMONARY NODULE: ICD-10-CM

## 2025-03-25 DIAGNOSIS — R05.3 CHRONIC COUGH: ICD-10-CM

## 2025-03-25 DIAGNOSIS — J39.8 OTHER SPECIFIED DISEASES OF UPPER RESPIRATORY TRACT: ICD-10-CM

## 2025-03-25 PROCEDURE — G2211 COMPLEX E/M VISIT ADD ON: CPT

## 2025-03-25 PROCEDURE — 99214 OFFICE O/P EST MOD 30 MIN: CPT

## 2025-04-22 NOTE — CONSULT NOTE ADULT - PROBLEM SELECTOR PROBLEM 2
Pt to ED from home with [facility] with dizziness and slurred speech.  Pt has prior hx of stroke in 2022 and MS.  LKW 1130.  Pt denies chest pain and SOB. Pt does not take BT. AO4      Pneumonia, aspiration

## 2025-05-13 ENCOUNTER — RESULT REVIEW (OUTPATIENT)
Age: 80
End: 2025-05-13

## 2025-05-13 ENCOUNTER — APPOINTMENT (OUTPATIENT)
Dept: PULMONOLOGY | Facility: CLINIC | Age: 80
End: 2025-05-13

## 2025-05-13 VITALS
TEMPERATURE: 98.3 F | RESPIRATION RATE: 20 BRPM | WEIGHT: 160 LBS | OXYGEN SATURATION: 93 % | BODY MASS INDEX: 31.41 KG/M2 | SYSTOLIC BLOOD PRESSURE: 105 MMHG | HEART RATE: 93 BPM | DIASTOLIC BLOOD PRESSURE: 58 MMHG | HEIGHT: 60 IN

## 2025-05-13 DIAGNOSIS — J96.11 CHRONIC RESPIRATORY FAILURE WITH HYPOXIA: ICD-10-CM

## 2025-05-13 DIAGNOSIS — R91.1 SOLITARY PULMONARY NODULE: ICD-10-CM

## 2025-05-13 DIAGNOSIS — R05.3 CHRONIC COUGH: ICD-10-CM

## 2025-05-13 DIAGNOSIS — J39.8 OTHER SPECIFIED DISEASES OF UPPER RESPIRATORY TRACT: ICD-10-CM

## 2025-05-13 DIAGNOSIS — R09.82 POSTNASAL DRIP: ICD-10-CM

## 2025-05-13 DIAGNOSIS — G47.33 OBSTRUCTIVE SLEEP APNEA (ADULT) (PEDIATRIC): ICD-10-CM

## 2025-05-13 PROCEDURE — 99214 OFFICE O/P EST MOD 30 MIN: CPT

## 2025-05-13 PROCEDURE — G2211 COMPLEX E/M VISIT ADD ON: CPT

## 2025-05-13 RX ORDER — FLUTICASONE PROPIONATE 50 UG/1
50 SPRAY, METERED NASAL TWICE DAILY
Qty: 1 | Refills: 2 | Status: ACTIVE | COMMUNITY
Start: 2025-05-13 | End: 1900-01-01

## 2025-06-09 ENCOUNTER — RX RENEWAL (OUTPATIENT)
Age: 80
End: 2025-06-09

## 2025-08-12 ENCOUNTER — APPOINTMENT (OUTPATIENT)
Dept: PULMONOLOGY | Facility: CLINIC | Age: 80
End: 2025-08-12
Payer: MEDICARE

## 2025-08-12 VITALS
DIASTOLIC BLOOD PRESSURE: 57 MMHG | SYSTOLIC BLOOD PRESSURE: 89 MMHG | BODY MASS INDEX: 33.77 KG/M2 | HEART RATE: 78 BPM | RESPIRATION RATE: 24 BRPM | TEMPERATURE: 98 F | HEIGHT: 60 IN | WEIGHT: 172 LBS | OXYGEN SATURATION: 93 %

## 2025-08-12 DIAGNOSIS — R05.3 CHRONIC COUGH: ICD-10-CM

## 2025-08-12 DIAGNOSIS — G47.33 OBSTRUCTIVE SLEEP APNEA (ADULT) (PEDIATRIC): ICD-10-CM

## 2025-08-12 DIAGNOSIS — R91.1 SOLITARY PULMONARY NODULE: ICD-10-CM

## 2025-08-12 DIAGNOSIS — J39.8 OTHER SPECIFIED DISEASES OF UPPER RESPIRATORY TRACT: ICD-10-CM

## 2025-08-12 DIAGNOSIS — J96.11 CHRONIC RESPIRATORY FAILURE WITH HYPOXIA: ICD-10-CM

## 2025-08-12 PROCEDURE — 99214 OFFICE O/P EST MOD 30 MIN: CPT

## 2025-08-12 PROCEDURE — G2211 COMPLEX E/M VISIT ADD ON: CPT
